# Patient Record
Sex: FEMALE | Race: WHITE | Employment: OTHER | URBAN - METROPOLITAN AREA
[De-identification: names, ages, dates, MRNs, and addresses within clinical notes are randomized per-mention and may not be internally consistent; named-entity substitution may affect disease eponyms.]

---

## 2019-02-27 ENCOUNTER — HOSPITAL ENCOUNTER (OUTPATIENT)
Age: 84
Discharge: HOME | End: 2019-03-01
Payer: MEDICARE

## 2019-02-27 LAB
ALBUMIN SERPL-MCNC: 4.5 G/DL (ref 3.5–5.2)
ALP BLD-CCNC: 115 U/L (ref 35–104)
ALT SERPL-CCNC: 26 U/L (ref 0–32)
ANION GAP SERPL CALCULATED.3IONS-SCNC: 13 MMOL/L (ref 7–16)
AST SERPL-CCNC: 37 U/L (ref 0–31)
BASOPHILS ABSOLUTE: 0.04 E9/L (ref 0–0.2)
BASOPHILS RELATIVE PERCENT: 0.7 % (ref 0–2)
BILIRUB SERPL-MCNC: 0.5 MG/DL (ref 0–1.2)
BUN BLDV-MCNC: 16 MG/DL (ref 8–23)
CALCIUM SERPL-MCNC: 9.6 MG/DL (ref 8.6–10.2)
CHLORIDE BLD-SCNC: 102 MMOL/L (ref 98–107)
CHOLESTEROL, TOTAL: 216 MG/DL (ref 0–199)
CO2: 28 MMOL/L (ref 22–29)
CREAT SERPL-MCNC: 0.8 MG/DL (ref 0.5–1)
EOSINOPHILS ABSOLUTE: 0.1 E9/L (ref 0.05–0.5)
EOSINOPHILS RELATIVE PERCENT: 1.7 % (ref 0–6)
GFR AFRICAN AMERICAN: >60
GFR NON-AFRICAN AMERICAN: >60 ML/MIN/1.73
GLUCOSE BLD-MCNC: 107 MG/DL (ref 74–99)
HCT VFR BLD CALC: 39.3 % (ref 34–48)
HDLC SERPL-MCNC: 63 MG/DL
HEMOGLOBIN: 11.8 G/DL (ref 11.5–15.5)
IMMATURE GRANULOCYTES #: 0.01 E9/L
IMMATURE GRANULOCYTES %: 0.2 % (ref 0–5)
LDL CHOLESTEROL CALCULATED: 140 MG/DL (ref 0–99)
LYMPHOCYTES ABSOLUTE: 0.83 E9/L (ref 1.5–4)
LYMPHOCYTES RELATIVE PERCENT: 14.2 % (ref 20–42)
MCH RBC QN AUTO: 28.5 PG (ref 26–35)
MCHC RBC AUTO-ENTMCNC: 30 % (ref 32–34.5)
MCV RBC AUTO: 94.9 FL (ref 80–99.9)
MONOCYTES ABSOLUTE: 0.53 E9/L (ref 0.1–0.95)
MONOCYTES RELATIVE PERCENT: 9.1 % (ref 2–12)
NEUTROPHILS ABSOLUTE: 4.33 E9/L (ref 1.8–7.3)
NEUTROPHILS RELATIVE PERCENT: 74.1 % (ref 43–80)
PDW BLD-RTO: 13.7 FL (ref 11.5–15)
PLATELET # BLD: 225 E9/L (ref 130–450)
PMV BLD AUTO: 11.8 FL (ref 7–12)
POTASSIUM SERPL-SCNC: 4.7 MMOL/L (ref 3.5–5)
RBC # BLD: 4.14 E12/L (ref 3.5–5.5)
SODIUM BLD-SCNC: 143 MMOL/L (ref 132–146)
TOTAL PROTEIN: 7.3 G/DL (ref 6.4–8.3)
TRIGL SERPL-MCNC: 63 MG/DL (ref 0–149)
TSH SERPL DL<=0.05 MIU/L-ACNC: 1.35 UIU/ML (ref 0.27–4.2)
VITAMIN D 25-HYDROXY: 45 NG/ML (ref 30–100)
VLDLC SERPL CALC-MCNC: 13 MG/DL
WBC # BLD: 5.8 E9/L (ref 4.5–11.5)

## 2019-02-27 PROCEDURE — 84443 ASSAY THYROID STIM HORMONE: CPT

## 2019-02-27 PROCEDURE — 80061 LIPID PANEL: CPT

## 2019-02-27 PROCEDURE — 85025 COMPLETE CBC W/AUTO DIFF WBC: CPT

## 2019-02-27 PROCEDURE — 80053 COMPREHEN METABOLIC PANEL: CPT

## 2019-02-27 PROCEDURE — 82306 VITAMIN D 25 HYDROXY: CPT

## 2021-01-24 ENCOUNTER — APPOINTMENT (OUTPATIENT)
Dept: GENERAL RADIOLOGY | Age: 86
DRG: 316 | End: 2021-01-24
Payer: MEDICARE

## 2021-01-24 ENCOUNTER — APPOINTMENT (OUTPATIENT)
Dept: CT IMAGING | Age: 86
DRG: 316 | End: 2021-01-24
Payer: MEDICARE

## 2021-01-24 ENCOUNTER — HOSPITAL ENCOUNTER (INPATIENT)
Age: 86
LOS: 4 days | Discharge: HOME | DRG: 316 | End: 2021-01-29
Attending: EMERGENCY MEDICINE | Admitting: INTERNAL MEDICINE
Payer: MEDICARE

## 2021-01-24 DIAGNOSIS — J90 PLEURAL EFFUSION: ICD-10-CM

## 2021-01-24 DIAGNOSIS — J18.9 PNEUMONIA DUE TO ORGANISM: ICD-10-CM

## 2021-01-24 DIAGNOSIS — I31.39 PERICARDIAL EFFUSION: Primary | ICD-10-CM

## 2021-01-24 LAB
ANION GAP SERPL CALCULATED.3IONS-SCNC: 11 MMOL/L (ref 7–16)
APTT: 31.8 SEC (ref 24.5–35.1)
BACTERIA: NORMAL /HPF
BASOPHILS ABSOLUTE: 0.04 E9/L (ref 0–0.2)
BASOPHILS RELATIVE PERCENT: 0.6 % (ref 0–2)
BILIRUBIN URINE: NEGATIVE
BLOOD, URINE: ABNORMAL
BUN BLDV-MCNC: 15 MG/DL (ref 8–23)
CALCIUM SERPL-MCNC: 9.6 MG/DL (ref 8.6–10.2)
CHLORIDE BLD-SCNC: 102 MMOL/L (ref 98–107)
CLARITY: CLEAR
CO2: 26 MMOL/L (ref 22–29)
COLOR: YELLOW
CREAT SERPL-MCNC: 1.1 MG/DL (ref 0.5–1)
EOSINOPHILS ABSOLUTE: 0.11 E9/L (ref 0.05–0.5)
EOSINOPHILS RELATIVE PERCENT: 1.6 % (ref 0–6)
GFR AFRICAN AMERICAN: 57
GFR NON-AFRICAN AMERICAN: 47 ML/MIN/1.73
GLUCOSE BLD-MCNC: 105 MG/DL (ref 74–99)
GLUCOSE URINE: NEGATIVE MG/DL
HCT VFR BLD CALC: 38.2 % (ref 34–48)
HEMOGLOBIN: 11.4 G/DL (ref 11.5–15.5)
IMMATURE GRANULOCYTES #: 0.01 E9/L
IMMATURE GRANULOCYTES %: 0.1 % (ref 0–5)
INR BLD: 1.5
KETONES, URINE: NEGATIVE MG/DL
LEUKOCYTE ESTERASE, URINE: NEGATIVE
LYMPHOCYTES ABSOLUTE: 0.81 E9/L (ref 1.5–4)
LYMPHOCYTES RELATIVE PERCENT: 11.9 % (ref 20–42)
MCH RBC QN AUTO: 25.4 PG (ref 26–35)
MCHC RBC AUTO-ENTMCNC: 29.8 % (ref 32–34.5)
MCV RBC AUTO: 85.3 FL (ref 80–99.9)
MONOCYTES ABSOLUTE: 0.65 E9/L (ref 0.1–0.95)
MONOCYTES RELATIVE PERCENT: 9.6 % (ref 2–12)
NEUTROPHILS ABSOLUTE: 5.17 E9/L (ref 1.8–7.3)
NEUTROPHILS RELATIVE PERCENT: 76.2 % (ref 43–80)
NITRITE, URINE: NEGATIVE
PDW BLD-RTO: 16.4 FL (ref 11.5–15)
PH UA: 6 (ref 5–9)
PLATELET # BLD: 136 E9/L (ref 130–450)
PMV BLD AUTO: 13.2 FL (ref 7–12)
POTASSIUM REFLEX MAGNESIUM: 3.9 MMOL/L (ref 3.5–5)
PROTEIN UA: NEGATIVE MG/DL
PROTHROMBIN TIME: 16.4 SEC (ref 9.3–12.4)
RBC # BLD: 4.48 E12/L (ref 3.5–5.5)
RBC UA: NORMAL /HPF (ref 0–2)
SARS-COV-2, NAAT: NOT DETECTED
SODIUM BLD-SCNC: 139 MMOL/L (ref 132–146)
SPECIFIC GRAVITY UA: <=1.005 (ref 1–1.03)
TROPONIN: <0.01 NG/ML (ref 0–0.03)
UROBILINOGEN, URINE: 0.2 E.U./DL
WBC # BLD: 6.8 E9/L (ref 4.5–11.5)
WBC UA: NORMAL /HPF (ref 0–5)

## 2021-01-24 PROCEDURE — 71046 X-RAY EXAM CHEST 2 VIEWS: CPT

## 2021-01-24 PROCEDURE — 2580000003 HC RX 258: Performed by: EMERGENCY MEDICINE

## 2021-01-24 PROCEDURE — 85610 PROTHROMBIN TIME: CPT

## 2021-01-24 PROCEDURE — 85730 THROMBOPLASTIN TIME PARTIAL: CPT

## 2021-01-24 PROCEDURE — 36415 COLL VENOUS BLD VENIPUNCTURE: CPT

## 2021-01-24 PROCEDURE — U0002 COVID-19 LAB TEST NON-CDC: HCPCS

## 2021-01-24 PROCEDURE — 70450 CT HEAD/BRAIN W/O DYE: CPT

## 2021-01-24 PROCEDURE — 85025 COMPLETE CBC W/AUTO DIFF WBC: CPT

## 2021-01-24 PROCEDURE — 83880 ASSAY OF NATRIURETIC PEPTIDE: CPT

## 2021-01-24 PROCEDURE — 87088 URINE BACTERIA CULTURE: CPT

## 2021-01-24 PROCEDURE — 84484 ASSAY OF TROPONIN QUANT: CPT

## 2021-01-24 PROCEDURE — 99285 EMERGENCY DEPT VISIT HI MDM: CPT

## 2021-01-24 PROCEDURE — 81001 URINALYSIS AUTO W/SCOPE: CPT

## 2021-01-24 PROCEDURE — 71045 X-RAY EXAM CHEST 1 VIEW: CPT

## 2021-01-24 PROCEDURE — 96374 THER/PROPH/DIAG INJ IV PUSH: CPT

## 2021-01-24 PROCEDURE — 93005 ELECTROCARDIOGRAM TRACING: CPT | Performed by: EMERGENCY MEDICINE

## 2021-01-24 PROCEDURE — 80048 BASIC METABOLIC PNL TOTAL CA: CPT

## 2021-01-24 RX ORDER — 0.9 % SODIUM CHLORIDE 0.9 %
1000 INTRAVENOUS SOLUTION INTRAVENOUS ONCE
Status: COMPLETED | OUTPATIENT
Start: 2021-01-24 | End: 2021-01-24

## 2021-01-24 RX ADMIN — SODIUM CHLORIDE 1000 ML: 9 INJECTION, SOLUTION INTRAVENOUS at 20:29

## 2021-01-25 ENCOUNTER — APPOINTMENT (OUTPATIENT)
Dept: GENERAL RADIOLOGY | Age: 86
DRG: 316 | End: 2021-01-25
Payer: MEDICARE

## 2021-01-25 ENCOUNTER — ANESTHESIA EVENT (OUTPATIENT)
Dept: OPERATING ROOM | Age: 86
DRG: 316 | End: 2021-01-25
Payer: MEDICARE

## 2021-01-25 ENCOUNTER — APPOINTMENT (OUTPATIENT)
Dept: CT IMAGING | Age: 86
DRG: 316 | End: 2021-01-25
Payer: MEDICARE

## 2021-01-25 ENCOUNTER — ANESTHESIA (OUTPATIENT)
Dept: OPERATING ROOM | Age: 86
DRG: 316 | End: 2021-01-25
Payer: MEDICARE

## 2021-01-25 VITALS — OXYGEN SATURATION: 95 %

## 2021-01-25 PROBLEM — I31.39 PERICARDIAL EFFUSION: Status: ACTIVE | Noted: 2021-01-25

## 2021-01-25 PROBLEM — G89.4 PAIN SYNDROME, CHRONIC: Status: ACTIVE | Noted: 2021-01-25

## 2021-01-25 LAB
ABO/RH: NORMAL
ANION GAP SERPL CALCULATED.3IONS-SCNC: 13 MMOL/L (ref 7–16)
ANTIBODY SCREEN: NORMAL
BLOOD BANK DISPENSE STATUS: NORMAL
BLOOD BANK PRODUCT CODE: NORMAL
BPU ID: NORMAL
BUN BLDV-MCNC: 12 MG/DL (ref 8–23)
C-REACTIVE PROTEIN: 0.3 MG/DL (ref 0–0.4)
CALCIUM SERPL-MCNC: 9.1 MG/DL (ref 8.6–10.2)
CHLORIDE BLD-SCNC: 105 MMOL/L (ref 98–107)
CO2: 23 MMOL/L (ref 22–29)
CREAT SERPL-MCNC: 0.8 MG/DL (ref 0.5–1)
DESCRIPTION BLOOD BANK: NORMAL
EKG ATRIAL RATE: 99 BPM
EKG P AXIS: 42 DEGREES
EKG P-R INTERVAL: 138 MS
EKG Q-T INTERVAL: 310 MS
EKG QRS DURATION: 74 MS
EKG QTC CALCULATION (BAZETT): 397 MS
EKG R AXIS: 10 DEGREES
EKG T AXIS: 23 DEGREES
EKG VENTRICULAR RATE: 99 BPM
GFR AFRICAN AMERICAN: >60
GFR NON-AFRICAN AMERICAN: >60 ML/MIN/1.73
GLUCOSE BLD-MCNC: 105 MG/DL (ref 74–99)
HCT VFR BLD CALC: 35.8 % (ref 34–48)
HEMOGLOBIN: 11.1 G/DL (ref 11.5–15.5)
MAGNESIUM: 1.9 MG/DL (ref 1.6–2.6)
MCH RBC QN AUTO: 25.7 PG (ref 26–35)
MCHC RBC AUTO-ENTMCNC: 31 % (ref 32–34.5)
MCV RBC AUTO: 82.9 FL (ref 80–99.9)
PDW BLD-RTO: 16.4 FL (ref 11.5–15)
PLATELET # BLD: 128 E9/L (ref 130–450)
PMV BLD AUTO: 12.6 FL (ref 7–12)
POTASSIUM SERPL-SCNC: 3.8 MMOL/L (ref 3.5–5)
PRO-BNP: 1535 PG/ML (ref 0–450)
RBC # BLD: 4.32 E12/L (ref 3.5–5.5)
SEDIMENTATION RATE, ERYTHROCYTE: 2 MM/HR (ref 0–20)
SODIUM BLD-SCNC: 141 MMOL/L (ref 132–146)
TSH SERPL DL<=0.05 MIU/L-ACNC: 1.77 UIU/ML (ref 0.27–4.2)
WBC # BLD: 5.7 E9/L (ref 4.5–11.5)

## 2021-01-25 PROCEDURE — 2580000003 HC RX 258: Performed by: THORACIC SURGERY (CARDIOTHORACIC VASCULAR SURGERY)

## 2021-01-25 PROCEDURE — 6360000002 HC RX W HCPCS: Performed by: ANESTHESIOLOGY

## 2021-01-25 PROCEDURE — 3600000014 HC SURGERY LEVEL 4 ADDTL 15MIN: Performed by: THORACIC SURGERY (CARDIOTHORACIC VASCULAR SURGERY)

## 2021-01-25 PROCEDURE — 2580000003 HC RX 258: Performed by: EMERGENCY MEDICINE

## 2021-01-25 PROCEDURE — 86140 C-REACTIVE PROTEIN: CPT

## 2021-01-25 PROCEDURE — 87070 CULTURE OTHR SPECIMN AEROBIC: CPT

## 2021-01-25 PROCEDURE — 88305 TISSUE EXAM BY PATHOLOGIST: CPT

## 2021-01-25 PROCEDURE — 84443 ASSAY THYROID STIM HORMONE: CPT

## 2021-01-25 PROCEDURE — 80048 BASIC METABOLIC PNL TOTAL CA: CPT

## 2021-01-25 PROCEDURE — 86850 RBC ANTIBODY SCREEN: CPT

## 2021-01-25 PROCEDURE — 87102 FUNGUS ISOLATION CULTURE: CPT

## 2021-01-25 PROCEDURE — 71250 CT THORAX DX C-: CPT

## 2021-01-25 PROCEDURE — 86923 COMPATIBILITY TEST ELECTRIC: CPT

## 2021-01-25 PROCEDURE — 33017 PRCRD DRG 6YR+ W/O CGEN CAR: CPT | Performed by: THORACIC SURGERY (CARDIOTHORACIC VASCULAR SURGERY)

## 2021-01-25 PROCEDURE — 6370000000 HC RX 637 (ALT 250 FOR IP): Performed by: INTERNAL MEDICINE

## 2021-01-25 PROCEDURE — 6360000002 HC RX W HCPCS: Performed by: PHYSICIAN ASSISTANT

## 2021-01-25 PROCEDURE — 93010 ELECTROCARDIOGRAM REPORT: CPT | Performed by: INTERNAL MEDICINE

## 2021-01-25 PROCEDURE — 87075 CULTR BACTERIA EXCEPT BLOOD: CPT

## 2021-01-25 PROCEDURE — 7100000001 HC PACU RECOVERY - ADDTL 15 MIN: Performed by: THORACIC SURGERY (CARDIOTHORACIC VASCULAR SURGERY)

## 2021-01-25 PROCEDURE — C1729 CATH, DRAINAGE: HCPCS | Performed by: THORACIC SURGERY (CARDIOTHORACIC VASCULAR SURGERY)

## 2021-01-25 PROCEDURE — 99222 1ST HOSP IP/OBS MODERATE 55: CPT | Performed by: THORACIC SURGERY (CARDIOTHORACIC VASCULAR SURGERY)

## 2021-01-25 PROCEDURE — 7100000000 HC PACU RECOVERY - FIRST 15 MIN: Performed by: THORACIC SURGERY (CARDIOTHORACIC VASCULAR SURGERY)

## 2021-01-25 PROCEDURE — 2709999900 HC NON-CHARGEABLE SUPPLY: Performed by: THORACIC SURGERY (CARDIOTHORACIC VASCULAR SURGERY)

## 2021-01-25 PROCEDURE — 2580000003 HC RX 258: Performed by: INTERNAL MEDICINE

## 2021-01-25 PROCEDURE — 51798 US URINE CAPACITY MEASURE: CPT

## 2021-01-25 PROCEDURE — 6370000000 HC RX 637 (ALT 250 FOR IP): Performed by: THORACIC SURGERY (CARDIOTHORACIC VASCULAR SURGERY)

## 2021-01-25 PROCEDURE — 6360000002 HC RX W HCPCS

## 2021-01-25 PROCEDURE — 3700000000 HC ANESTHESIA ATTENDED CARE: Performed by: THORACIC SURGERY (CARDIOTHORACIC VASCULAR SURGERY)

## 2021-01-25 PROCEDURE — 3600000004 HC SURGERY LEVEL 4 BASE: Performed by: THORACIC SURGERY (CARDIOTHORACIC VASCULAR SURGERY)

## 2021-01-25 PROCEDURE — 88112 CYTOPATH CELL ENHANCE TECH: CPT

## 2021-01-25 PROCEDURE — 71045 X-RAY EXAM CHEST 1 VIEW: CPT

## 2021-01-25 PROCEDURE — 6360000002 HC RX W HCPCS: Performed by: THORACIC SURGERY (CARDIOTHORACIC VASCULAR SURGERY)

## 2021-01-25 PROCEDURE — 86901 BLOOD TYPING SEROLOGIC RH(D): CPT

## 2021-01-25 PROCEDURE — 3700000001 HC ADD 15 MINUTES (ANESTHESIA): Performed by: THORACIC SURGERY (CARDIOTHORACIC VASCULAR SURGERY)

## 2021-01-25 PROCEDURE — 0W9D3ZZ DRAINAGE OF PERICARDIAL CAVITY, PERCUTANEOUS APPROACH: ICD-10-PCS | Performed by: THORACIC SURGERY (CARDIOTHORACIC VASCULAR SURGERY)

## 2021-01-25 PROCEDURE — 2580000003 HC RX 258

## 2021-01-25 PROCEDURE — 87205 SMEAR GRAM STAIN: CPT

## 2021-01-25 PROCEDURE — 6360000002 HC RX W HCPCS: Performed by: EMERGENCY MEDICINE

## 2021-01-25 PROCEDURE — 2140000000 HC CCU INTERMEDIATE R&B

## 2021-01-25 PROCEDURE — 2500000003 HC RX 250 WO HCPCS: Performed by: EMERGENCY MEDICINE

## 2021-01-25 PROCEDURE — 85027 COMPLETE CBC AUTOMATED: CPT

## 2021-01-25 PROCEDURE — 86900 BLOOD TYPING SEROLOGIC ABO: CPT

## 2021-01-25 PROCEDURE — 36415 COLL VENOUS BLD VENIPUNCTURE: CPT

## 2021-01-25 PROCEDURE — 85651 RBC SED RATE NONAUTOMATED: CPT

## 2021-01-25 PROCEDURE — 83735 ASSAY OF MAGNESIUM: CPT

## 2021-01-25 PROCEDURE — 99999 PR OFFICE/OUTPT VISIT,PROCEDURE ONLY: CPT | Performed by: PHYSICIAN ASSISTANT

## 2021-01-25 RX ORDER — SODIUM CHLORIDE 0.9 % (FLUSH) 0.9 %
10 SYRINGE (ML) INJECTION EVERY 12 HOURS SCHEDULED
Status: DISCONTINUED | OUTPATIENT
Start: 2021-01-25 | End: 2021-01-29 | Stop reason: HOSPADM

## 2021-01-25 RX ORDER — ACETAMINOPHEN 325 MG/1
650 TABLET ORAL EVERY 4 HOURS PRN
Status: DISCONTINUED | OUTPATIENT
Start: 2021-01-25 | End: 2021-01-29 | Stop reason: HOSPADM

## 2021-01-25 RX ORDER — MORPHINE SULFATE 15 MG/1
15 TABLET, FILM COATED, EXTENDED RELEASE ORAL DAILY
Status: ON HOLD | COMMUNITY
End: 2021-01-29 | Stop reason: HOSPADM

## 2021-01-25 RX ORDER — CHOLECALCIFEROL (VITAMIN D3) 1250 MCG
CAPSULE ORAL WEEKLY
COMMUNITY

## 2021-01-25 RX ORDER — SODIUM CHLORIDE 0.9 % (FLUSH) 0.9 %
10 SYRINGE (ML) INJECTION PRN
Status: DISCONTINUED | OUTPATIENT
Start: 2021-01-25 | End: 2021-01-29 | Stop reason: HOSPADM

## 2021-01-25 RX ORDER — MEPERIDINE HYDROCHLORIDE 25 MG/ML
12.5 INJECTION INTRAMUSCULAR; INTRAVENOUS; SUBCUTANEOUS EVERY 5 MIN PRN
Status: DISCONTINUED | OUTPATIENT
Start: 2021-01-25 | End: 2021-01-25 | Stop reason: HOSPADM

## 2021-01-25 RX ORDER — FENTANYL CITRATE 50 UG/ML
INJECTION, SOLUTION INTRAMUSCULAR; INTRAVENOUS PRN
Status: DISCONTINUED | OUTPATIENT
Start: 2021-01-25 | End: 2021-01-25 | Stop reason: SDUPTHER

## 2021-01-25 RX ORDER — OXYCODONE HYDROCHLORIDE 5 MG/1
5 TABLET ORAL EVERY 4 HOURS PRN
Status: DISCONTINUED | OUTPATIENT
Start: 2021-01-25 | End: 2021-01-29 | Stop reason: HOSPADM

## 2021-01-25 RX ORDER — LISINOPRIL 10 MG/1
10 TABLET ORAL DAILY
Status: DISCONTINUED | OUTPATIENT
Start: 2021-01-25 | End: 2021-01-29 | Stop reason: HOSPADM

## 2021-01-25 RX ORDER — SENNA AND DOCUSATE SODIUM 50; 8.6 MG/1; MG/1
1 TABLET, FILM COATED ORAL 2 TIMES DAILY
Status: DISCONTINUED | OUTPATIENT
Start: 2021-01-25 | End: 2021-01-29 | Stop reason: HOSPADM

## 2021-01-25 RX ORDER — PROPOFOL 10 MG/ML
INJECTION, EMULSION INTRAVENOUS CONTINUOUS PRN
Status: DISCONTINUED | OUTPATIENT
Start: 2021-01-25 | End: 2021-01-25 | Stop reason: SDUPTHER

## 2021-01-25 RX ORDER — HYDRALAZINE HYDROCHLORIDE 20 MG/ML
5 INJECTION INTRAMUSCULAR; INTRAVENOUS EVERY 10 MIN PRN
Status: DISCONTINUED | OUTPATIENT
Start: 2021-01-25 | End: 2021-01-25 | Stop reason: HOSPADM

## 2021-01-25 RX ORDER — LABETALOL HYDROCHLORIDE 5 MG/ML
5 INJECTION, SOLUTION INTRAVENOUS EVERY 10 MIN PRN
Status: DISCONTINUED | OUTPATIENT
Start: 2021-01-25 | End: 2021-01-25 | Stop reason: HOSPADM

## 2021-01-25 RX ORDER — SODIUM CHLORIDE 9 MG/ML
INJECTION, SOLUTION INTRAVENOUS CONTINUOUS PRN
Status: DISCONTINUED | OUTPATIENT
Start: 2021-01-25 | End: 2021-01-25 | Stop reason: SDUPTHER

## 2021-01-25 RX ORDER — PROMETHAZINE HYDROCHLORIDE 25 MG/ML
6.25 INJECTION, SOLUTION INTRAMUSCULAR; INTRAVENOUS
Status: COMPLETED | OUTPATIENT
Start: 2021-01-25 | End: 2021-01-25

## 2021-01-25 RX ORDER — ACETAMINOPHEN 325 MG/1
650 TABLET ORAL EVERY 6 HOURS
Status: COMPLETED | OUTPATIENT
Start: 2021-01-25 | End: 2021-01-27

## 2021-01-25 RX ORDER — VENLAFAXINE 75 MG/1
75 TABLET ORAL DAILY
Status: DISCONTINUED | OUTPATIENT
Start: 2021-01-25 | End: 2021-01-29 | Stop reason: HOSPADM

## 2021-01-25 RX ORDER — ONDANSETRON 2 MG/ML
4 INJECTION INTRAMUSCULAR; INTRAVENOUS EVERY 6 HOURS PRN
Status: DISCONTINUED | OUTPATIENT
Start: 2021-01-25 | End: 2021-01-29 | Stop reason: HOSPADM

## 2021-01-25 RX ORDER — SODIUM CHLORIDE, SODIUM LACTATE, POTASSIUM CHLORIDE, CALCIUM CHLORIDE 600; 310; 30; 20 MG/100ML; MG/100ML; MG/100ML; MG/100ML
INJECTION, SOLUTION INTRAVENOUS CONTINUOUS
Status: DISCONTINUED | OUTPATIENT
Start: 2021-01-25 | End: 2021-01-27

## 2021-01-25 RX ORDER — BISACODYL 10 MG
10 SUPPOSITORY, RECTAL RECTAL DAILY PRN
Status: DISCONTINUED | OUTPATIENT
Start: 2021-01-25 | End: 2021-01-29 | Stop reason: HOSPADM

## 2021-01-25 RX ORDER — SODIUM CHLORIDE 9 MG/ML
INJECTION, SOLUTION INTRAVENOUS PRN
Status: DISCONTINUED | OUTPATIENT
Start: 2021-01-25 | End: 2021-01-29 | Stop reason: HOSPADM

## 2021-01-25 RX ORDER — MIDAZOLAM HYDROCHLORIDE 1 MG/ML
2 INJECTION INTRAMUSCULAR; INTRAVENOUS ONCE
Status: COMPLETED | OUTPATIENT
Start: 2021-01-25 | End: 2021-01-25

## 2021-01-25 RX ORDER — AMLODIPINE BESYLATE 5 MG/1
5 TABLET ORAL DAILY
Status: DISCONTINUED | OUTPATIENT
Start: 2021-01-25 | End: 2021-01-29 | Stop reason: HOSPADM

## 2021-01-25 RX ADMIN — FENTANYL CITRATE 50 MCG: 50 INJECTION, SOLUTION INTRAMUSCULAR; INTRAVENOUS at 13:14

## 2021-01-25 RX ADMIN — SODIUM CHLORIDE, PRESERVATIVE FREE 10 ML: 5 INJECTION INTRAVENOUS at 08:12

## 2021-01-25 RX ADMIN — PROMETHAZINE HYDROCHLORIDE 6.25 MG: 25 INJECTION INTRAMUSCULAR; INTRAVENOUS at 13:56

## 2021-01-25 RX ADMIN — SODIUM CHLORIDE, PRESERVATIVE FREE 10 ML: 5 INJECTION INTRAVENOUS at 21:10

## 2021-01-25 RX ADMIN — LISINOPRIL 10 MG: 10 TABLET ORAL at 17:42

## 2021-01-25 RX ADMIN — SODIUM CHLORIDE: 9 INJECTION, SOLUTION INTRAVENOUS at 13:14

## 2021-01-25 RX ADMIN — CEFTRIAXONE SODIUM 1000 MG: 1 INJECTION, POWDER, FOR SOLUTION INTRAMUSCULAR; INTRAVENOUS at 01:06

## 2021-01-25 RX ADMIN — SODIUM CHLORIDE, POTASSIUM CHLORIDE, SODIUM LACTATE AND CALCIUM CHLORIDE: 600; 310; 30; 20 INJECTION, SOLUTION INTRAVENOUS at 08:12

## 2021-01-25 RX ADMIN — DOCUSATE SODIUM 50 MG AND SENNOSIDES 8.6 MG 1 TABLET: 8.6; 5 TABLET, FILM COATED ORAL at 21:10

## 2021-01-25 RX ADMIN — AMLODIPINE BESYLATE 5 MG: 5 TABLET ORAL at 17:41

## 2021-01-25 RX ADMIN — FENTANYL CITRATE 50 MCG: 50 INJECTION, SOLUTION INTRAMUSCULAR; INTRAVENOUS at 13:16

## 2021-01-25 RX ADMIN — PROPOFOL 50 MCG/KG/MIN: 10 INJECTION, EMULSION INTRAVENOUS at 13:14

## 2021-01-25 RX ADMIN — MIDAZOLAM 2 MG: 1 INJECTION INTRAMUSCULAR; INTRAVENOUS at 13:13

## 2021-01-25 RX ADMIN — DOXYCYCLINE 100 MG: 100 INJECTION, POWDER, LYOPHILIZED, FOR SOLUTION INTRAVENOUS at 01:09

## 2021-01-25 RX ADMIN — OXYCODONE 5 MG: 5 TABLET ORAL at 21:30

## 2021-01-25 RX ADMIN — ACETAMINOPHEN 650 MG: 325 TABLET ORAL at 02:50

## 2021-01-25 RX ADMIN — Medication 2 G: at 13:25

## 2021-01-25 RX ADMIN — Medication 2000 MG: at 21:10

## 2021-01-25 RX ADMIN — ACETAMINOPHEN 650 MG: 325 TABLET ORAL at 17:02

## 2021-01-25 RX ADMIN — VENLAFAXINE HYDROCHLORIDE 75 MG: 75 TABLET ORAL at 17:41

## 2021-01-25 RX ADMIN — ACETAMINOPHEN 650 MG: 325 TABLET ORAL at 21:31

## 2021-01-25 ASSESSMENT — PULMONARY FUNCTION TESTS
PIF_VALUE: 1
PIF_VALUE: 1
PIF_VALUE: 0
PIF_VALUE: 1
PIF_VALUE: 0
PIF_VALUE: 1
PIF_VALUE: 0

## 2021-01-25 ASSESSMENT — PAIN SCALES - GENERAL
PAINLEVEL_OUTOF10: 0
PAINLEVEL_OUTOF10: 0
PAINLEVEL_OUTOF10: 9
PAINLEVEL_OUTOF10: 4
PAINLEVEL_OUTOF10: 6
PAINLEVEL_OUTOF10: 0

## 2021-01-25 ASSESSMENT — PAIN DESCRIPTION - DESCRIPTORS: DESCRIPTORS: ACHING;DULL;HEADACHE

## 2021-01-25 ASSESSMENT — PAIN DESCRIPTION - FREQUENCY: FREQUENCY: INTERMITTENT

## 2021-01-25 NOTE — ANESTHESIA PROCEDURE NOTES
Arterial Line:    An arterial line was placed using surface landmarks, in the OR for the following indication(s): continuous blood pressure monitoring and blood sampling needed. A 20 gauge (size), 1 and 3/8 inch (length), Arrow (type) catheter was placed, Seldinger technique not used, into the right radial artery, secured by tape. Anesthesia type: Local  Local infiltration: Injection    Events:  patient tolerated procedure well with no complications. Anesthesiologist: Arabella Persaud MD  Resident/CRNA: HAIDER Lane - CRNA  Other anesthesia staff: Sal Barrera RN  Performed:  Other anesthesia staff   Preanesthetic Checklist  Completed: patient identified, IV checked, site marked, risks and benefits discussed, surgical consent, monitors and equipment checked, pre-op evaluation, timeout performed, anesthesia consent given, oxygen available and patient being monitored

## 2021-01-25 NOTE — ANESTHESIA POSTPROCEDURE EVALUATION
Department of Anesthesiology  Postprocedure Note    Patient: Ignacia Echevarria  MRN: [id number]  YOB: 1935  Date of evaluation: 1/25/2021  Time:  2:53 PM     Procedure Summary     Date: 01/25/21 Room / Location: [address] [place] [address] / [facility]    Anesthesia Start: 3191 Anesthesia Stop: 3457    Procedure: PERICARDIOCENTESIS (N/A ) Diagnosis: (.)    Surgeons: Joanie Salazar MD Responsible Provider: Sammi Stewart MD    Anesthesia Type: MAC ASA Status: 4          Anesthesia Type: MAC    Stacey Phase I: Stacey Score: 10    Stacey Phase II:      Last vitals: Reviewed and per EMR flowsheets.        Anesthesia Post Evaluation    Patient location during evaluation: PACU  Patient participation: complete - patient participated  Level of consciousness: awake and alert  Airway patency: patent  Nausea & Vomiting: no nausea and no vomiting  Complications: no  Cardiovascular status: hemodynamically stable and blood pressure returned to baseline  Respiratory status: acceptable  Hydration status: euvolemic

## 2021-01-25 NOTE — PLAN OF CARE
Problem: Pain:  Description: Pain management should include both nonpharmacologic and pharmacologic interventions.   Goal: Pain level will decrease  Description: Pain level will decrease  Outcome: Met This Shift     Problem: Falls - Risk of:  Goal: Will remain free from falls  Description: Will remain free from falls  Outcome: Met This Shift

## 2021-01-25 NOTE — BRIEF OP NOTE
Brief Postoperative Note    Kashif Nassar  YOB: 1935  [id number]    Pre-operative Diagnosis: Pericardial effusion    Post-operative Diagnosis: Same    Operation: Ultrasound guided pericardiocentesis with drain    Anesthesia: [facility]    Surgeon: Apolonia King    Assistants: Dante    Estimated Blood Loss: less than 50     Complications: None    Specimens:   ID Type Source Tests Collected by Time Destination   1 : PERICARDIAL FLUID Tissue Heart CULTURE, ANAEROBIC, CULTURE, FUNGUS, GRAM STAIN, CULTURE, SURGICAL Rosa Maria Bennett MD 1/25/2021 1340    A : PERICARDIAL FLUID Tissue Heart CYTOLOGY, NON-GYN Rosa Maria Bennett MD 1/25/2021 1341        Implants:  * No implants in log *      Drains: * No LDAs found *    Findings: 1405 cc straw colored fluid, pericardial space empty at end of case    Electronically signed by Rosa Maria Bennett MD on 1/25/2021 at 1:44 PM

## 2021-01-25 NOTE — PROGRESS NOTES
Silvia. Rob Alberto [address] [facility] [id number] Re: patient is requesting he should be at bedside.

## 2021-01-25 NOTE — CARE COORDINATION
SOCIAL WORK/CASEMANAGEMENT TRANSITION OF CARE [id number] [facility] [place], [address], [place], [phone]): I called son, tanja, to complete assessment. He said pt lives alone in a 2 story home with 3 front and back steps to enter. Pt no longer drives. She is independent with bathing and dressing. Catrina Givens said he has been helping with care of pt for the past 5 years and he sets up her pill paks. Pt fixes mainly t.v. dinners and gets meals on wheels. No c pta. Pt has 2 sons but local. Catrina Givens works full times. Shasha Hayes just retired and only visits 1x a month and is moving to [place] soon. He and pt have a strained relationship. Pt doesn't have a advance directives. Pt has a fww, cane, shower chair and cowboy toilet. Pt has been to [facility] in the past. Catrina Givens said he and the pcp have talked multiple times that pt should live in a eliazar but she refuses. I left advance directives in the room for tanja. I told him that now is the time due to age and forgetfulness to complete them with pt before they cannot be done. We talked about aides coming in and pt refuses. We discussed as well a alert button and that per son was not a good option he said pt may hit it multiple times a day. Sw/cm to follow up with tanja and pt for discharge needs. Will need PT and OT ordered after any procedure to figure out discharge needs.  Maricel Steward  1/25/2021

## 2021-01-25 NOTE — OP NOTE
[address]                  [address] [place],  [facility]                                OPERATIVE REPORT    PATIENT NAME: Kaitlynn Carlson                   :        1935  MED REC NO:   [id number]                            ROOM:       [facility]  ACCOUNT NO:   [de-identified]                           ADMIT DATE: 2021  PROVIDER:     Katia Lind MD    DATE OF PROCEDURE:  2021    PREOPERATIVE DIAGNOSIS:  Pericardial effusion. POSTOPERATIVE DIAGNOSIS:  Pericardial effusion. INDICATION:  Pericardial effusion. SURGEON:  Katia Lind MD    ASSISTANT:  GEOVANNY Lam, (no other resident was adequately  trained to be able to assist). SECOND ASSISTANT:  Sergey Powers DO    COMPLICATIONS:  None, tolerated well. ESTIMATED BLOOD LOSS:  Less than 50 mL. ANESTHESIA:  Local with MAC anesthesia. ANESTHESIA ATTENDING:  Carolin Pagan MD    SPECIMEN OBTAINED:  Include pericardial fluid for culture and cytology. OPERATIONS PERFORMED:  Ultrasound-guided pericardiocentesis with drain  placement. FINDINGS:  There was 1405 mL of straw-colored pericardial fluid. The  pericardial space was empty at the end of the case. HISTORY:  This is an 26-year-old female who was admitted with dizziness. She was found to have a massive pericardial effusion. She is referred  for drainage. The patient was described the procedure in full including  risks and complications including but not limited to bleeding,  infection, need for reoperation, hemothorax, pneumothorax, stroke,  myocardial infarction, and death; and the patient agreed to proceed. DESCRIPTION OF PROCEDURE:  After adequate informed consent was obtained  and adequate preoperative antibiotics were given, the patient was  brought to the operating room in stable condition. She was laid in the  supine position and induced under MAC anesthesia by Anesthesia staff.    Her chest and abdomen were prepped and draped in the usual sterile  fashion. Ultrasound was used to find adequate window and local  medication was injected. A Cook needle was used to gain access into the  pericardial space and a guidewire was placed. This was then dilated up  to a 13-Sami pigtail, which was secured appropriately. We then  manually aspirated about 1405 mL of straw-colored pericardial fluid. Specimen was sent for culture and cytology. The drain was connected to  Pleur-evac and dressing was applied. The patient tolerated the  procedure well. The patient was transferred to recovery room in stable  condition. All sponge, instrument and needle counts were correct at the  end of the case.         Nawaf Fraga MD    D: 01/25/2021 14:01:49       T: 01/25/2021 15:19:49     [email or web address]  Job#: [id number]     Doc#: [id number]    CC:  Ena Escoto DO

## 2021-01-25 NOTE — ANESTHESIA PRE PROCEDURE
Department of Anesthesiology  Preprocedure Note       Name:  Hayden Taylor   Age:  82 y.o.  :  1935                                          MRN:  [id number]         Date:  2021      Surgeon: Mick Jaime):  Griselda Collins MD    Procedure: Procedure(s):  PERICARDIOCENTESIS    Medications prior to admission:   Prior to Admission medications    Medication Sig Start Date End Date Taking? Authorizing Provider   morphine (MS CONTIN) 15 MG extended release tablet Take 15 mg by mouth daily. Yes Historical Provider, MD   Cholecalciferol (VITAMIN D3) 1.25 MG (42913 UT) CAPS Take by mouth once a week    Historical Provider, MD   aspirin 325 MG EC tablet Take 1 tablet by mouth daily. 14   Silvio Delacruz MD   amLODIPine (NORVASC) 10 MG tablet Take 1 tablet by mouth daily. Patient taking differently: Take 5 mg by mouth daily  14   Silvio Delacruz MD   lisinopril (PRINIVIL;ZESTRIL) 10 MG tablet Take 1 tablet by mouth daily.  14   Silvio Delacruz MD   venlafaxine (EFFEXOR) 37.5 MG tablet Take 75 mg by mouth daily     Historical Provider, MD       Current medications:    Current Facility-Administered Medications   Medication Dose Route Frequency Provider Last Rate Last Admin    sodium chloride flush 0.9 % injection 10 mL  10 mL Intravenous 2 times per day Travis Moreno DO   10 mL at 21 4542    sodium chloride flush 0.9 % injection 10 mL  10 mL Intravenous PRN Travis Moreno DO        enoxaparin (LOVENOX) injection 40 mg  40 mg Subcutaneous Daily Travis Moreno DO        acetaminophen (TYLENOL) tablet 650 mg  650 mg Oral Q4H PRN Travis Moreno DO   650 mg at 21 0250    lactated ringers infusion   Intravenous Continuous Griselda Collins MD 75 mL/hr at 21 0812 New Bag at 21 0812    ceFAZolin (ANCEF) 2000 mg in sterile water 20 mL IV syringe  2,000 mg Intravenous See Admin Instructions GEOVANNY Anguiano        0.9 % sodium chloride infusion   Intravenous PRN Griselda Collins MD Allergies: Allergies   Allergen Reactions    Other Rash     \"A type of IVP dye from the 50's\"       Problem List:    Patient Active Problem List   Diagnosis Code    Anxiety F41.9    Migraine headache G43.909    Accidental drug overdose T50.901A    Essential hypertension, benign I10    GERD (gastroesophageal reflux disease) K21.9    Dementia ([facility] [address] F03.90    Polypharmacy Z79.899    Malnutrition ([facility] E46    Acute delirium R41.0    Pericardial effusion I31.3       Past Medical History:        Diagnosis Date    Accidental drug overdose 2014    Acute delirium 2014    Anxiety     Arthritis     Dizziness, nonspecific     Generalized headaches     Heart murmur     stable per pt    Hypertension     Knee pain     right    Malnutrition ([facility] [address] [facility] 2014    Migraine headache     Polypharmacy 2014    Reflux gastritis     Rhabdomyolysis 2014    Sinus problem        Past Surgical History:        Procedure Laterality Date    APPENDECTOMY      BACK SURGERY      lumbar    CHOLECYSTECTOMY      HYSTERECTOMY      TOTAL KNEE ARTHROPLASTY  2012    right       Social History:    Social History     Tobacco Use    Smoking status: Former Smoker     Years: 45.00     Quit date: 2007     Years since quittin.3    Smokeless tobacco: Never Used   Substance Use Topics    Alcohol use:  No                                Counseling given: Not Answered      Vital Signs (Current):   Vitals:    21 0142 21 0242 21 0730 21 1056   BP: (!) 147/98 (!) 149/84 137/81 (!) 161/85   Pulse: 84 84 92 99   Resp: 17 19 19 18   Temp: 36.7 °C (98.1 °F) 36.7 °C (98 °F) 36.6 °C (97.8 °F) 36.9 °C (98.5 °F)   TempSrc: Temporal Temporal Oral Axillary   SpO2: 98% 96% 97% 97%   Weight:  147 lb 11.2 oz (67 kg)     Height:                                                  BP Readings from Last 3 Encounters:   21 (!) 161/85   12 115/68       NPO Status: Time of last liquid consumption: 2000                        Time of last solid consumption: 2000                        Date of last liquid consumption: 01/24/21                        Date of last solid food consumption: 01/24/21    BMI:   Wt Readings from Last 3 Encounters:   01/25/21 147 lb 11.2 oz (67 kg)   09/11/12 164 lb (74.4 kg)     Body mass index is 21.81 kg/m². CBC:   Lab Results   Component Value Date    WBC 6.8 01/24/2021    RBC 4.48 01/24/2021    HGB 11.4 01/24/2021    HCT 38.2 01/24/2021    MCV 85.3 01/24/2021    RDW 16.4 01/24/2021     01/24/2021       CMP:   Lab Results   Component Value Date     01/24/2021    K 3.9 01/24/2021     01/24/2021    CO2 26 01/24/2021    BUN 15 01/24/2021    CREATININE 1.1 01/24/2021    GFRAA 57 01/24/2021    LABGLOM 47 01/24/2021    GLUCOSE 105 01/24/2021    GLUCOSE 90 05/16/2012    PROT 7.3 02/27/2019    CALCIUM 9.6 01/24/2021    BILITOT 0.5 02/27/2019    ALKPHOS 115 02/27/2019    AST 37 02/27/2019    ALT 26 02/27/2019       POC Tests: No results for input(s): POCGLU, POCNA, POCK, POCCL, POCBUN, POCHEMO, POCHCT in the last 72 hours.     Coags:   Lab Results   Component Value Date    PROTIME 16.4 01/24/2021    PROTIME 11.5 06/24/2011    INR 1.5 01/24/2021    APTT 31.8 01/24/2021       HCG (If Applicable): No results found for: PREGTESTUR, PREGSERUM, HCG, HCGQUANT     ABGs: No results found for: PHART, PO2ART, VXB4JXN, JLL5TJW, BEART, T3DPFMTD     Type & Screen (If Applicable):  No results found for: LABABO, LABRH    Drug/Infectious Status (If Applicable):  No results found for: HIV, HEPCAB    COVID-19 Screening (If Applicable):   Lab Results   Component Value Date    COVID19 Not Detected 01/24/2021 1/25/21 CT of chest     Anesthesia Evaluation  Patient summary reviewed and Nursing notes reviewed no history of anesthetic complications:   Airway: Mallampati: II  TM distance: >3 FB   Neck ROM: full  Mouth opening: > = 3 FB Dental:    (+) upper dentures and lower dentures      Pulmonary: breath sounds clear to auscultation            Patient did not smoke on day of surgery. Cardiovascular:  Exercise tolerance: poor (<4 METS),   (+) hypertension:,       ECG reviewed  Rhythm: regular  Rate: normal                    Neuro/Psych:   (+) neuromuscular disease:, headaches:, psychiatric history:depression/anxiety             GI/Hepatic/Renal:   (+) GERD:,           Endo/Other:                     Abdominal:       Abdomen: soft. Vascular:                                        Anesthesia Plan      MAC     ASA 4     (Pt has hx of dementia, History obtain from chart and nursing staff )  Induction: intravenous. arterial line    Anesthetic plan and risks discussed with patient. Use of blood products discussed with patient whom. Plan discussed with CRNA and attending. Dania Nj RN   1/25/2021      DOS STAFF ADDENDUM:    Patient seen and chart reviewed. Physical exam and history updated as indicated. NPO status confirmed. Anesthesia options and plan discussed including risks benefits with patient/legal guardian and family as available. Concerns and questions addressed. Consent verbalized to proceed.   Anesthesia plan, options and intraoperative/postoperative concerns discussed with care team.    Martha Steven MD  1/25/2021  1:03 PM

## 2021-01-25 NOTE — PROGRESS NOTES
Pt belongings ( 2 bags shirt shoes pants coat underwear and purse) bagged labeled and placed on pt counter.

## 2021-01-25 NOTE — PROGRESS NOTES
Two rings and watch were removed from patient to go to OR and locked in [facility] drawer. Also called Son, tanja and notified going down to surgery. Will update after.          Lucía Kohler

## 2021-01-25 NOTE — PATIENT CARE CONFERENCE
P Quality Flow/Interdisciplinary Rounds Progress Note        Quality Flow Rounds held on January 25, 2021    Disciplines Attending:  Bedside Nurse, ,  and Nursing Unit Leadership    Suzi Fontaine was admitted on 1/24/2021  7:35 PM    Anticipated Discharge Date:  Expected Discharge Date: 01/28/21    Disposition:    Gerson Score:  Gerson Scale Score: 22    Readmission Risk              Risk of Unplanned Readmission:        10           Discussed patient goal for the day, patient clinical progression, and barriers to discharge.   The following Goal(s) of the Day/Commitment(s) have been identified:  to keep patient calm       Bong Atkins  January 25, 2021

## 2021-01-25 NOTE — ED PROVIDER NOTES
HPI:  1/24/21,   Time: 8:21 PM АЛЕКСАНДР Martin is a 82 y.o. female presenting to the ED for dizziness, beginning 2 days ago. The complaint has been intermittent, mild in severity, and worsened by nothing. The patient states that over the last 2 days she has been having intermittent dizziness with a sensation of the room spinning. She states that it happens abruptly. She states that nothing really triggers it that she can think of. She states that she took Dramamine after having an episode which made it worse therefore she came to the ED to be evaluated. She denies any chest pain. She states she has been getting short of breath with ambulation. No numbness tingling. No recent sickness. Otherwise asymptomatic. Currently she has no vertigo. Review of Systems:   Pertinent positives and negatives are stated within HPI, all other systems reviewed and are negative.          --------------------------------------------- PAST HISTORY ---------------------------------------------  Past Medical History:  has a past medical history of Accidental drug overdose, Acute delirium, Anxiety, Arthritis, Dizziness, nonspecific, Generalized headaches, Heart murmur, Hypertension, Knee pain, Malnutrition ([address] Migraine headache, Polypharmacy, Reflux gastritis, Rhabdomyolysis, and Sinus problem. Past Surgical History:  has a past surgical history that includes Appendectomy; Cholecystectomy; Hysterectomy; back surgery (2007); and Total knee arthroplasty (9/17/2012). Social History:  reports that she quit smoking about 13 years ago. She quit after 45.00 years of use. She has never used smokeless tobacco. She reports that she does not drink alcohol or use drugs. Family History: family history is not on file. The patients home medications have been reviewed. Allergies:  Other        ---------------------------------------------------PHYSICAL EXAM--------------------------------------    Constitutional/General: Alert and oriented x3, thin  Head: Normocephalic and atraumatic  Eyes: PERRL, EOMI, conjunctive normal, sclera non icteric  Mouth: Oropharynx clear, handling secretions, no trismus, no asymmetry of the posterior oropharynx or uvular edema, dry mucous membranes  Neck: Supple, full ROM, non tender to palpation in the midline, no stridor, no crepitus, no meningeal signs  Respiratory: Diminished breath sounds at bases no wheezes, rales, or rhonchi. Not in respiratory distress  Cardiovascular:  Regular rate. Regular rhythm. No murmurs, gallops, or rubs. 2+ distal pulses  GI:  Abdomen Soft, Non tender, Non distended. +BS. No organomegaly, no palpable masses,  No rebound, guarding, or rigidity. Musculoskeletal: Moves all extremities x 4. Warm and well perfused, no clubbing, cyanosis, or edema. Capillary refill <3 seconds  Integument: skin warm and dry. No rashes. Neurologic: GCS 15, no focal deficits, symmetric strength 5/5 in the upper and lower extremities bilaterally  Psychiatric: Normal Affect    -------------------------------------------------- RESULTS -------------------------------------------------  I have personally reviewed all laboratory and imaging results for this patient. Results are listed below.      LABS:  Results for orders placed or performed during the hospital encounter of 01/24/21   CBC Auto Differential   Result Value Ref Range    WBC 6.8 4.5 - 11.5 E9/L    RBC 4.48 3.50 - 5.50 E12/L    Hemoglobin 11.4 (L) 11.5 - 15.5 g/dL    Hematocrit 38.2 34.0 - 48.0 %    MCV 85.3 80.0 - 99.9 fL    MCH 25.4 (L) 26.0 - 35.0 pg    MCHC 29.8 (L) 32.0 - 34.5 %    RDW 16.4 (H) 11.5 - 15.0 fL    Platelets 705 994 - 184 E9/L    MPV 13.2 (H) 7.0 - 12.0 fL    Neutrophils % 76.2 43.0 - 80.0 %    Immature Granulocytes % 0.1 0.0 - 5.0 %    Lymphocytes % 11.9 (L) 20.0 - 42.0 %    Monocytes % 9.6 2.0 - 12.0 %    Eosinophils % 1.6 0.0 - 6.0 %    Basophils % 0.6 0.0 - 2.0 %    Neutrophils Absolute 5.17 1.80 - 7.30 E9/L    Immature Granulocytes # 0.01 E9/L    Lymphocytes Absolute 0.81 (L) 1.50 - 4.00 E9/L    Monocytes Absolute 0.65 0.10 - 0.95 E9/L    Eosinophils Absolute 0.11 0.05 - 0.50 E9/L    Basophils Absolute 0.04 0.00 - 0.20 J6/M   Basic Metabolic Panel w/ Reflex to MG   Result Value Ref Range    Sodium 139 132 - 146 mmol/L    Potassium reflex Magnesium 3.9 3.5 - 5.0 mmol/L    Chloride 102 98 - 107 mmol/L    CO2 26 22 - 29 mmol/L    Anion Gap 11 7 - 16 mmol/L    Glucose 105 (H) 74 - 99 mg/dL    BUN 15 8 - 23 mg/dL    CREATININE 1.1 (H) 0.5 - 1.0 mg/dL    GFR Non-African American 47 >=60 mL/min/1.73    GFR African American 57     Calcium 9.6 8.6 - 10.2 mg/dL   Troponin   Result Value Ref Range    Troponin <0.01 0.00 - 0.03 ng/mL   Urinalysis, reflex to microscopic   Result Value Ref Range    Color, UA Yellow Straw/Yellow    Clarity, UA Clear Clear    Glucose, Ur Negative Negative mg/dL    Bilirubin Urine Negative Negative    Ketones, Urine Negative Negative mg/dL    Specific Gravity, UA <=1.005 1.005 - 1.030    Blood, Urine SMALL (A) Negative    pH, UA 6.0 5.0 - 9.0    Protein, UA Negative Negative mg/dL    Urobilinogen, Urine 0.2 <2.0 E.U./dL    Nitrite, Urine Negative Negative    Leukocyte Esterase, Urine Negative Negative   Protime-INR   Result Value Ref Range    Protime 16.4 (H) 9.3 - 12.4 sec    INR 1.5    APTT   Result Value Ref Range    aPTT 31.8 24.5 - 35.1 sec   COVID-19   Result Value Ref Range    SARS-CoV-2, NAAT Not Detected Not Detected   Microscopic Urinalysis   Result Value Ref Range    WBC, UA 0-1 0 - 5 /HPF    RBC, UA 0-1 0 - 2 /HPF    Bacteria, UA NONE SEEN None Seen /HPF   Brain Natriuretic Peptide   Result Value Ref Range    Pro-BNP 1,535 (H) 0 - 450 pg/mL       RADIOLOGY:  Interpreted by Radiologist.  CT CHEST WO CONTRAST   Final Result   Massive pericardial effusion with significant constriction of the heart   highly concerning for pericardial tamponade. Moderate left and small right pericardial pleural effusion . Focal consolidative changes of the left lower lobe and the medial aspect of   the lingula may be related to atelectasis due to adjacent effusion or may   represent pneumonia. Critical results were called by Dr. Shana Mccauley MD to Burt Hull   on 1/25/2021 at 00:27. XR CHEST (2 VW)   Final Result   Stable cardiomegaly with increasing central pulmonary congestion. Hazy left basilar atelectasis and/or consolidation and probable mild to   moderate left pleural effusion which is unchanged   Increasing right infrahilar opacity extending inferiorly. CT Head WO Contrast   Final Result   Mild atrophy and moderate chronic microischemic disease throughout the deep   white matter which is unchanged with no acute abnormality seen. XR CHEST PORTABLE   Final Result   Mild cardiomegaly and mild central pulmonary congestion which is more   prominent. Questionable moderate left pleural effusion and associated left basilar   atelectasis and consolidation. Suggest a follow-up PA and lateral chest   Hazy right basilar opacity medially which could represent atelectasis or   infiltrate. EKG: This EKG is signed and interpreted by the EP. EKG shows sinus rhythm with premature supraventricular complexes. Nonspecific ST and T wave changes noted. 9 9 bpm.  No STEMI.    ------------------------- NURSING NOTES AND VITALS REVIEWED ---------------------------   The nursing notes within the ED encounter and vital signs as below have been reviewed by myself. BP (!) 142/96   Pulse 84   Temp 98 °F (36.7 °C) (Temporal)   Resp 17   Ht 5' 9\" (1.753 m)   Wt 160 lb (72.6 kg)   SpO2 98%   BMI 23.63 kg/m²   Oxygen Saturation Interpretation: Normal    The patients available past medical records and past encounters were reviewed.         ------------------------------ ED COURSE/MEDICAL DECISION MAKING----------------------  Medications   doxycycline (VIBRAMYCIN) 100 mg in dextrose 5 % 100 mL IVPB (100 mg Intravenous New Bag 1/25/21 0109)   sodium chloride flush 0.9 % injection 10 mL (has no administration in time range)   sodium chloride flush 0.9 % injection 10 mL (has no administration in time range)   enoxaparin (LOVENOX) injection 40 mg (has no administration in time range)   acetaminophen (TYLENOL) tablet 650 mg (has no administration in time range)   0.9 % sodium chloride bolus (0 mLs Intravenous Stopped 1/24/21 2244)   cefTRIAXone (ROCEPHIN) 1,000 mg in sterile water 10 mL IV syringe (0 mg Intravenous Stopped 1/25/21 0109)         ED COURSE:       Medical Decision Making: This is an 31-year-old female presents to the ED for dizziness. Patient underwent laboratory work-up which showed a normal CBC except for hemoglobin 11.4. Normal chemistry. Normal troponin. Urinalysis unremarkable. Covid test negative. BNP was elevated 1535. Patient's head CT unremarkable. Chest x-ray showed cardiomegaly pulmonary congestion and bilateral pleural effusions. However the patient underwent CT which showed a massive pericardial effusion with significant constriction of the heart highly concerning for pericardial tamponade. Moderate left and right  pleural effusions. Focal consolidations noted. Therefore the patient was covered with antibiotics. Clinically the patient has stable vital signs with no signs of cardiac tamponade per the patient's vital signs but due to the massive pericardial effusion I did speak to cardiothoracic surgery Dr. Bienvenido Perez. Discussed case. Due to the patient being hemodynamically stable no need for stat echo at this time. Case discussed with Dr. Gilford Carder who is excepted the patient for admission.       I, Dr. Mayte Mayen, am the primary provider for this encounter    This patient's ED course included: a personal history and physicial examination, re-evaluation prior to disposition, multiple bedside re-evaluations, IV medications, cardiac monitoring and continuous pulse oximetry    This patient has remained hemodynamically stable during their ED course. Re-Evaluations:             Re-evaluation. Patients symptoms show no change      Counseling: The emergency provider has spoken with the patient and discussed todays results, in addition to providing specific details for the plan of care and counseling regarding the diagnosis and prognosis. Questions are answered at this time and they are agreeable with the plan.       --------------------------------- IMPRESSION AND DISPOSITION ---------------------------------    IMPRESSION  1. Pericardial effusion    2. Pleural effusion    3. Pneumonia due to organism        DISPOSITION  Disposition: Admit to telemetry  Patient condition is stable    NOTE: This report was transcribed using voice recognition software.  Every effort was made to ensure accuracy; however, inadvertent computerized transcription errors may be present        Vinny Daniels DO  01/25/21 0129

## 2021-01-25 NOTE — H&P
History and Physical      CHIEF COMPLAINT:   dizzy      HISTORY OF PRESENT ILLNESS:       History reviewed with the patients son and PCP. The patient is a 82 y.o. female patient of Dr. Denise Ardon presents with a dizzy feeling over the past 2 weeks and wiith vertigo(room spinning) immediately prior to the ER visit. She self tx with Dramamine w/o improvement. There is no past history cardiac or pulmonary disorders with the exception of a known heart murmur. She denies fever, chills or sweats. The patient is cognitively impaired d/t dementia. She is on medication for HTN and morphine for chronic low back pain. Past Medical History:    Past Medical History:   Diagnosis Date    Accidental drug overdose 5/21/2014    Acute delirium 12/26/2014    Anxiety     Arthritis     Dizziness, nonspecific     Generalized headaches     Heart murmur     stable per pt    Hypertension     Knee pain     right    Malnutrition ([address] 12/26/2014    Migraine headache     Polypharmacy 12/26/2014    Reflux gastritis     Rhabdomyolysis 5/21/2014    Sinus problem        Past Surgical History:    Past Surgical History:   Procedure Laterality Date    APPENDECTOMY      BACK SURGERY  2007    lumbar    CHOLECYSTECTOMY      HYSTERECTOMY      TOTAL KNEE ARTHROPLASTY  9/17/2012    right       Medications Prior to Admission:    Medications Prior to Admission: morphine (MS CONTIN) 15 MG extended release tablet, Take 15 mg by mouth daily. Cholecalciferol (VITAMIN D3) 1.25 MG (18486 UT) CAPS, Take by mouth once a week  aspirin 325 MG EC tablet, Take 1 tablet by mouth daily. amLODIPine (NORVASC) 10 MG tablet, Take 1 tablet by mouth daily. (Patient taking differently: Take 5 mg by mouth daily )  lisinopril (PRINIVIL;ZESTRIL) 10 MG tablet, Take 1 tablet by mouth daily.   venlafaxine (EFFEXOR) 37.5 MG tablet, Take 75 mg by mouth daily   [DISCONTINUED] HYDROcodone-acetaminophen (LORTAB) 7.5-500 MG per tablet, Take 1 tablet by mouth every 6 hours as needed. [DISCONTINUED] bismuth subsalicylate (PEPTO BISMOL) 262 MG/15ML suspension, Take 2.5 mLs by mouth once. [DISCONTINUED] LORazepam (ATIVAN) 1 MG tablet, Take 1 mg by mouth 2 times daily. [DISCONTINUED] omeprazole (PRILOSEC) 20 MG capsule, Take 20 mg by mouth daily as needed. Allergies: Other    Social History:    reports that she quit smoking about 13 years ago. She quit after 45.00 years of use. She has never used smokeless tobacco. She reports that she does not drink alcohol or use drugs. Family History:   family history is not on file. REVIEW OF SYSTEMS:  As above in the HPI, otherwise negative    PHYSICAL EXAM:    Vitals:  /70   Pulse 97   Temp 98.9 °F (37.2 °C) (Temporal)   Resp 20   Ht 5' 9\" (1.753 m)   Wt 147 lb 11.2 oz (67 kg)   SpO2 95%   BMI 21.81 kg/m²     General:   Awake, alert, oriented X 3. Severe anxiety d/t pending surgery  HEENT:    Normocephalic, atraumatic. Pupils equal, round, reactive to light. No scleral icterus. No conjunctival injection. Oropharynx clear. No nasal discharge. Marked NVD  Neck:       Supple. No bruits, adenopathy, masses, neck vein distention. Trachea in the midline. Heart:      RRR, right parasternal systolic murmur, no gallops, rubs  Lungs:     Diminished but clear bilaterally, bilat symmetrical expansion, no wheeze, rales, or rhonchi  Abdomen:   Bowel sounds present, soft, nontender, no masses, no organomegaly, no peritoneal signs  Extremities:  No clubbing, cyanosis, or edema  Skin:         Warm and dry, no open lesions or rash  Neuro:     Cranial nerves 2-12 intact, no focal deficits  Breast:    deferred  Rectal:    deferred  Genitalia:  deferred    LABS:    CBC with Differential:    Lab Results   Component Value Date    WBC 5.7 01/25/2021    RBC 4.32 01/25/2021    HGB 11.1 01/25/2021    HCT 35.8 01/25/2021     01/25/2021    MCV 82.9 01/25/2021    MCH 25.7 01/25/2021    MCHC 31.0 01/25/2021    RDW 16.4 01/25/2021    SEGSPCT 72 02/22/2014    LYMPHOPCT 11.9 01/24/2021    MONOPCT 9.6 01/24/2021    BASOPCT 0.6 01/24/2021    MONOSABS 0.65 01/24/2021    LYMPHSABS 0.81 01/24/2021    EOSABS 0.11 01/24/2021    BASOSABS 0.04 01/24/2021     CMP:    Lab Results   Component Value Date     01/25/2021    K 3.8 01/25/2021    K 3.9 01/24/2021     01/25/2021    CO2 23 01/25/2021    BUN 12 01/25/2021    CREATININE 0.8 01/25/2021    GFRAA >60 01/25/2021    LABGLOM >60 01/25/2021    GLUCOSE 105 01/25/2021    GLUCOSE 90 05/16/2012    PROT 7.3 02/27/2019    LABALBU 4.5 02/27/2019    LABALBU 4.2 05/16/2012    CALCIUM 9.1 01/25/2021    BILITOT 0.5 02/27/2019    ALKPHOS 115 02/27/2019    AST 37 02/27/2019    ALT 26 02/27/2019     TSH:    Lab Results   Component Value Date    TSH 1.770 01/25/2021       ASSESSMENT:      Active Hospital Problems    Diagnosis Date Noted    Pericardial effusion [I31.3] 01/25/2021    Pain syndrome, chronic [G89.4] 01/25/2021    Essential hypertension, benign [I10] 05/22/2014    Dementia ([facility] [address] [F03.90] 05/22/2014        PLAN:  Admit to a monitored floor               CTS consult               Sed rate and CRP               TSH    Travis Moreno DO  6:18 PM  1/25/2021     Updated: Hospital Course:  History reviewed with the patients son and PCP. The patient is a 82 y.o. female patient of Dr. Isidro Palmer presents with a dizzy feeling over the past 2 weeks and wiith vertigo(room spinning) immediately prior to the ER visit. She self tx with Dramamine w/o improvement. There is no past history of cardiac or pulmonary disorders with the exception of a known heart murmur. She denied  fever, chills or sweats. The patient is cognitively impaired d/t dementia. She is on medication for HTN and morphine for chronic low back pain. Upon admission a massive pericardial effusion was identified. Urgent pericardiocentesis was performed with 1405 cc fluid removed.  Echo also confirmed critical aortic stenosis with a 100 mmHg gradient. Above findings d/w with the patients son. Final family decisions are pending at this time on further investigation in view of her mental status.  The patient is discharged to [facility] in improved and stable condition with OP f/u as directed.

## 2021-01-25 NOTE — CONSULTS
CTS Consult    Patient name: Jayme Hou    Reason for consult: Pericardial effusion    Referring Physician: Dr. Mare Zuniga    Primary Care Physician: Dinah Bañuelos DO    Date of service: 1/25/2021    Chief Complaint: Dizziness    HPI: 82year old female seen in ER for dizziness. She had a CXR showing widened mediastinum prompting a CT which shows a large pericardial effusion. She is entirely hemodynamically stable including a HR of 84 and SBP of 150. She denies CP, SOB, THOMPSON, LE edema, N/V, F/C, orthopnea, PND, and syncope. Allergies:    Allergies   Allergen Reactions    Other Rash     \"A type of IVP dye from the 50's\"       Home medications:    Current Facility-Administered Medications   Medication Dose Route Frequency Provider Last Rate Last Admin    sodium chloride flush 0.9 % injection 10 mL  10 mL Intravenous 2 times per day Travis Moreno DO        sodium chloride flush 0.9 % injection 10 mL  10 mL Intravenous PRN Travis Moreno DO        enoxaparin (LOVENOX) injection 40 mg  40 mg Subcutaneous Daily Travis Moreno DO        acetaminophen (TYLENOL) tablet 650 mg  650 mg Oral Q4H PRN Travis Moreno DO   650 mg at 01/25/21 0250       Past Medical History:  Past Medical History:   Diagnosis Date    Accidental drug overdose 5/21/2014    Acute delirium 12/26/2014    Anxiety     Arthritis     Dizziness, nonspecific     Generalized headaches     Heart murmur     stable per pt    Hypertension     Knee pain     right    Malnutrition ([address] 12/26/2014    Migraine headache     Polypharmacy 12/26/2014    Reflux gastritis     Rhabdomyolysis 5/21/2014    Sinus problem        Past Surgical History:  Past Surgical History:   Procedure Laterality Date    APPENDECTOMY      BACK SURGERY  2007    lumbar    CHOLECYSTECTOMY      HYSTERECTOMY      TOTAL KNEE ARTHROPLASTY  9/17/2012    right       Social History:  Social History     Socioeconomic History    Marital status:  Spouse name: Not on file    Number of children: Not on file    Years of education: Not on file    Highest education level: Not on file   Occupational History    Not on file   Social Needs    Financial resource strain: Not on file    Food insecurity     Worry: Not on file     Inability: Not on file    Transportation needs     Medical: Not on file     Non-medical: Not on file   Tobacco Use    Smoking status: Former Smoker     Years: 45.00     Quit date: 2007     Years since quittin.3    Smokeless tobacco: Never Used   Substance and Sexual Activity    Alcohol use: No    Drug use: No    Sexual activity: Not on file   Lifestyle    Physical activity     Days per week: Not on file     Minutes per session: Not on file    Stress: Not on file   Relationships    Social connections     Talks on phone: Not on file     Gets together: Not on file     Attends Synagogue service: Not on file     Active member of club or organization: Not on file     Attends meetings of clubs or organizations: Not on file     Relationship status: Not on file    Intimate partner violence     Fear of current or ex partner: Not on file     Emotionally abused: Not on file     Physically abused: Not on file     Forced sexual activity: Not on file   Other Topics Concern    Not on file   Social History Narrative    Not on file       Family History:  History reviewed. No pertinent family history. Review of Systems:  Constitutional: Denies fevers, chills, or weight loss. HEENT: Denies visual changes or hearing loss. Heart: As per HPI. Lungs: Denies shortness of breath, cough, or wheezing. Gastrointestinal: Denies nausea, vomiting, constipation, or diarrhea. Genitourinary: dysuria or hematuria. Psychiatric: Patient denies anxiety or depression. Neurologic: Patient denies weakness of the extremities, dizziness, or headaches. All other ROS checked and found to be negative.     Labs:  Recent Labs     21   WBC

## 2021-01-26 LAB
ANION GAP SERPL CALCULATED.3IONS-SCNC: 9 MMOL/L (ref 7–16)
BUN BLDV-MCNC: 14 MG/DL (ref 8–23)
CALCIUM SERPL-MCNC: 8.3 MG/DL (ref 8.6–10.2)
CHLORIDE BLD-SCNC: 107 MMOL/L (ref 98–107)
CO2: 25 MMOL/L (ref 22–29)
CREAT SERPL-MCNC: 0.8 MG/DL (ref 0.5–1)
GFR AFRICAN AMERICAN: >60
GFR NON-AFRICAN AMERICAN: >60 ML/MIN/1.73
GLUCOSE BLD-MCNC: 107 MG/DL (ref 74–99)
GRAM STAIN ORDERABLE: NORMAL
HCT VFR BLD CALC: 38.3 % (ref 34–48)
HEMOGLOBIN: 11.8 G/DL (ref 11.5–15.5)
MCH RBC QN AUTO: 25.8 PG (ref 26–35)
MCHC RBC AUTO-ENTMCNC: 30.8 % (ref 32–34.5)
MCV RBC AUTO: 83.8 FL (ref 80–99.9)
PDW BLD-RTO: 16.6 FL (ref 11.5–15)
PLATELET # BLD: 137 E9/L (ref 130–450)
PMV BLD AUTO: 12.1 FL (ref 7–12)
POTASSIUM SERPL-SCNC: 3.5 MMOL/L (ref 3.5–5)
RBC # BLD: 4.57 E12/L (ref 3.5–5.5)
SODIUM BLD-SCNC: 141 MMOL/L (ref 132–146)
WBC # BLD: 10.7 E9/L (ref 4.5–11.5)

## 2021-01-26 PROCEDURE — 97161 PT EVAL LOW COMPLEX 20 MIN: CPT

## 2021-01-26 PROCEDURE — 6360000002 HC RX W HCPCS: Performed by: THORACIC SURGERY (CARDIOTHORACIC VASCULAR SURGERY)

## 2021-01-26 PROCEDURE — 36415 COLL VENOUS BLD VENIPUNCTURE: CPT

## 2021-01-26 PROCEDURE — 2580000003 HC RX 258: Performed by: THORACIC SURGERY (CARDIOTHORACIC VASCULAR SURGERY)

## 2021-01-26 PROCEDURE — 85027 COMPLETE CBC AUTOMATED: CPT

## 2021-01-26 PROCEDURE — 6370000000 HC RX 637 (ALT 250 FOR IP): Performed by: THORACIC SURGERY (CARDIOTHORACIC VASCULAR SURGERY)

## 2021-01-26 PROCEDURE — 80048 BASIC METABOLIC PNL TOTAL CA: CPT

## 2021-01-26 PROCEDURE — 97530 THERAPEUTIC ACTIVITIES: CPT

## 2021-01-26 PROCEDURE — 2140000000 HC CCU INTERMEDIATE R&B

## 2021-01-26 RX ADMIN — OXYCODONE 5 MG: 5 TABLET ORAL at 13:17

## 2021-01-26 RX ADMIN — ACETAMINOPHEN 650 MG: 325 TABLET ORAL at 22:25

## 2021-01-26 RX ADMIN — ACETAMINOPHEN 650 MG: 325 TABLET ORAL at 05:10

## 2021-01-26 RX ADMIN — ACETAMINOPHEN 650 MG: 325 TABLET ORAL at 10:59

## 2021-01-26 RX ADMIN — DOCUSATE SODIUM 50 MG AND SENNOSIDES 8.6 MG 1 TABLET: 8.6; 5 TABLET, FILM COATED ORAL at 08:38

## 2021-01-26 RX ADMIN — Medication 2000 MG: at 05:21

## 2021-01-26 RX ADMIN — SODIUM CHLORIDE, PRESERVATIVE FREE 10 ML: 5 INJECTION INTRAVENOUS at 05:21

## 2021-01-26 RX ADMIN — VENLAFAXINE HYDROCHLORIDE 75 MG: 75 TABLET ORAL at 08:38

## 2021-01-26 RX ADMIN — SODIUM CHLORIDE, POTASSIUM CHLORIDE, SODIUM LACTATE AND CALCIUM CHLORIDE: 600; 310; 30; 20 INJECTION, SOLUTION INTRAVENOUS at 17:30

## 2021-01-26 RX ADMIN — ASPIRIN 325 MG: 325 TABLET, COATED ORAL at 08:38

## 2021-01-26 RX ADMIN — LISINOPRIL 10 MG: 10 TABLET ORAL at 08:38

## 2021-01-26 RX ADMIN — OXYCODONE 5 MG: 5 TABLET ORAL at 05:10

## 2021-01-26 RX ADMIN — DOCUSATE SODIUM 50 MG AND SENNOSIDES 8.6 MG 1 TABLET: 8.6; 5 TABLET, FILM COATED ORAL at 22:26

## 2021-01-26 RX ADMIN — SODIUM CHLORIDE, POTASSIUM CHLORIDE, SODIUM LACTATE AND CALCIUM CHLORIDE: 600; 310; 30; 20 INJECTION, SOLUTION INTRAVENOUS at 01:15

## 2021-01-26 RX ADMIN — ACETAMINOPHEN 650 MG: 325 TABLET ORAL at 16:38

## 2021-01-26 RX ADMIN — ENOXAPARIN SODIUM 40 MG: 40 INJECTION SUBCUTANEOUS at 08:38

## 2021-01-26 ASSESSMENT — PAIN SCALES - GENERAL
PAINLEVEL_OUTOF10: 0
PAINLEVEL_OUTOF10: 0
PAINLEVEL_OUTOF10: 4
PAINLEVEL_OUTOF10: 5
PAINLEVEL_OUTOF10: 4

## 2021-01-26 ASSESSMENT — PAIN - FUNCTIONAL ASSESSMENT: PAIN_FUNCTIONAL_ASSESSMENT: ACTIVITIES ARE NOT PREVENTED

## 2021-01-26 ASSESSMENT — PAIN DESCRIPTION - FREQUENCY: FREQUENCY: INTERMITTENT

## 2021-01-26 ASSESSMENT — PAIN DESCRIPTION - PROGRESSION: CLINICAL_PROGRESSION: NOT CHANGED

## 2021-01-26 ASSESSMENT — PAIN DESCRIPTION - DESCRIPTORS: DESCRIPTORS: TINGLING

## 2021-01-26 ASSESSMENT — PAIN DESCRIPTION - ONSET: ONSET: GRADUAL

## 2021-01-26 NOTE — PATIENT CARE CONFERENCE
[facility] Quality Flow/Interdisciplinary Rounds Progress Note        Quality Flow Rounds held on January 26, 2021    Disciplines Attending:  Bedside Nurse, ,  and Nursing Unit Leadership    Margot Edwards was admitted on 1/24/2021  7:35 PM    Anticipated Discharge Date:  Expected Discharge Date: 01/28/21    Disposition:    Gerson Score:  Gerson Scale Score: 18    Readmission Risk              Risk of Unplanned Readmission:        11           Discussed patient goal for the day, patient clinical progression, and barriers to discharge.   The following Goal(s) of the Day/Commitment(s) have been identified:  to keep chest tube intact until removed by doctor Anthony Salazar  January 26, 2021

## 2021-01-26 NOTE — PLAN OF CARE
Problem: Pain:  Goal: Pain level will decrease  Description: Pain level will decrease  Outcome: Met This Shift  Goal: Control of acute pain  Description: Control of acute pain  Outcome: Met This Shift  Goal: Control of chronic pain  Description: Control of chronic pain  Outcome: Met This Shift     Problem: Falls - Risk of:  Goal: Will remain free from falls  Description: Will remain free from falls  1/26/2021 1623 by Hina Bermudez RN  Outcome: Met This Shift  1/26/2021 0916 by Eloisa Echevarria RN  Outcome: Ongoing  Goal: Absence of physical injury  Description: Absence of physical injury  Outcome: Met This Shift     Problem: Skin Integrity:  Goal: Will show no infection signs and symptoms  Description: Will show no infection signs and symptoms  Outcome: Met This Shift  Goal: Absence of new skin breakdown  Description: Absence of new skin breakdown  Outcome: Met This Shift     Problem: Musculor/Skeletal Functional Status  Goal: Absence of falls  Outcome: Met This Shift     Problem: Musculor/Skeletal Functional Status  Goal: Highest potential functional level  Outcome: Ongoing

## 2021-01-26 NOTE — PROGRESS NOTES
POD#1  Awake, alert. No complaints. Denies CP, palpitations, SOB at rest, dizziness/lightheadedness. Sitting up in bed eating     Vitals:    01/25/21 2007 01/25/21 2129 01/26/21 0028 01/26/21 0506   BP: 123/71 (!) 118/59 120/71 122/74   Pulse: 90 96 91 88   Resp: 18 18 16 16   Temp: 99.3 °F (37.4 °C) 98.6 °F (37 °C) 97.8 °F (36.6 °C) 99 °F (37.2 °C)   TempSrc: Axillary Axillary Temporal Temporal   SpO2: 96%  92% 93%   Weight:       Height:         RA      Intake/Output Summary (Last 24 hours) at 1/26/2021 0826  Last data filed at 1/26/2021 0659  Gross per 24 hour   Intake 1351 ml   Output 3010 ml   Net -1659 ml               CT output: 650cc/24 hr      Recent Labs     01/24/21 2022 01/25/21  1418 01/26/21  0551   WBC 6.8 5.7 10.7   HGB 11.4* 11.1* 11.8   HCT 38.2 35.8 38.3    128* 137      Recent Labs     01/24/21 2022 01/25/21  1418 01/26/21  0551   BUN 15 12 14   CREATININE 1.1* 0.8 0.8           Telemetry: NSR      PE  Cardiac: RRR  Lungs: decreased bases  Chest Chest tubes x 1 present and secure.  Very mild swelling post to drainage tube  Abd: Soft, nontender, +BS  Ext: GALVAN            A/P: POD# 1 s/p Ultrasound guided pericardiocentesis with drain    VSS  Maintaining NSR  Cont CT today, still draining   Hgb stable at 11.8  Cont supportive care    Discussed with attending surgeon

## 2021-01-26 NOTE — PROGRESS NOTES
Physical Therapy  Physical Therapy Initial Assessment     Name: Misti Padilla  : 1935  MRN: [id number]    Referring Provider:  Martha Kmi MD    Date of Service: 2021    Evaluating PT:  Miguelito Joseph PT, DPT [id number]    Room #:  7009/7095-D  Diagnosis:  Pericardial effusion  Precautions: Falls, sitter, chest tube  Procedure/Surgery:   US guided pericardiocentesis with drain  PMHx/PSHx:  HTN  Equipment Needs:  TBD    SUBJECTIVE:    Pt lives alone in a 2 story home with 3 stairs to enter. Full flight of steps and 1 rail to bedroom. Pt ambulated without device and was independent with ADLs PTA. Son assisted with medication. OBJECTIVE:   Initial Evaluation  Date: 21 Treatment Short Term/ Long Term   Goals   AM-PAC 6 Clicks 85/06     Was pt agreeable to Eval/treatment? Yes     Does pt have pain? 3-4/10 L neck and B knee pain     Bed Mobility  Rolling: NT  Supine to sit: SBA with HOB elevated  Sit to supine: SBA  Scooting: SBA  Mod Independent   Transfers Sit to stand: Jerrod  Stand to sit: Jerrod  Stand pivot: NT  Mod Independent with AAD   Ambulation   3 feet F/B with Jerrod with [employer]  >150 feet with Mod Independent with AAD   Stair negotiation: ascended and descended NT  >10 steps with 1 rail Mod Independent   ROM BUE:  WFL  BLE:  WFL     Strength BUE:  WFL  BLE:  4/5  Increase by 1/3 MMT grade   Balance Sitting EOB:  Jerrod dynamic  Dynamic Standing:  Jerrod with [employer]  Sitting EOB:  Independent  Dynamic Standing: Mod Independent with AAD     Pt is A & O x 2 (Self and time)  Sensation: tingling in neck and head  Edema:  None    Therapeutic Exercises:  NA    Patient education  Pt educated on safety    Patient response to education:   Pt verbalized understanding Pt demonstrated skill Pt requires further education in this area   partial partial yes     ASSESSMENT:    Comments:  Pt was in bed upon arrival, agreeable to initial evaluation. Sitter present for session.   Pt required redirection to tasks and reorientation to place frequently throughout session. Pt was very tangential.  Encouragement required to complete tasks as pt wanted to get dressed first.  Pt completed a few steps from EOB before complaining of dizziness and requesting to sit down. Further activity was deferred. Pt was left in bed with all needs met and call light in reach. All lines remained intact. Treatment:  Patient practiced and was instructed in the following treatment:     Bed mobility training - pt given verbal and tactile cues to facilitate proper sequencing and safety during supine>sit as well as provided with physical assistance to complete task    Sitting EOB for >8 minutes for upright tolerance, postural awareness and BLE ROM   Transfer training - pt was given verbal and tactile cues to facilitate proper hand placement, technique and safety during sit to stand and stand to sit as well as provided with physical assistance to complete task.  Gait training- pt was given verbal and tactile cues to facilitate safety, balance and use of AD during ambulation as well as provided with physical assistance to complete task. Pt's/ family goals   1. Return home    Patient and or family understand(s) diagnosis, prognosis, and plan of care. Yes    PLAN OF CARE:    Current Treatment Recommendations     [x] Strengthening     [] ROM   [x] Balance Training   [x] Endurance Training   [x] Transfer Training   [x] Gait Training   [x] Stair Training   [] Positioning   [x] Safety and Education Training   [x] Patient/Caregiver Education   [] HEP  [] Other     Frequency of treatments: 2-5x/week x 1-2 weeks.     Time in  0747  Time out  0810    Total Treatment Time  18 minutes     Evaluation Time includes thorough review of current medical information, gathering information on past medical history/social history and prior level of function, completion of standardized testing/informal observation of tasks, assessment of data and education on plan of care and goals.     CPT codes:  [x] Low Complexity PT evaluation 24461  [] Moderate Complexity PT evaluation 39737  [] High Complexity PT evaluation 38752  [] PT Re-evaluation 21449  [] Gait training 75059 - minutes  [] Manual therapy 50843 - minutes  [x] Therapeutic activities 77998 18 minutes  [] Therapeutic exercises 73775 - minutes  [] Neuromuscular reeducation 86732 - minutes     Francisca Brenner, PT, DPT  [id number]

## 2021-01-26 NOTE — PROGRESS NOTES
Cardiothoracic Surgery   1/25/2021    Subjective:  Patient was seen and examined. I evaluated drain placement as well as area of concern posterior to the drain. There was some edematous change posterior to the drain site on the left side, possibly chronic in nature. The drain and the dressing looked clean and intact. There is no crepitus or skin changes over this area. Patient did not complain of any pain on palpation over this corresponding area. Patient's vitals are currently stable. Patient does not seem to be in any acute distress. We will continue to monitor area.     Electronically signed by Demetrius Queen DO on 1/25/2021 at 10:08 PM

## 2021-01-26 NOTE — PROGRESS NOTES
2043 Messaged Dr. Mirella Pacheco re: large hard lump around the CT insertion site. No airleak No sub q  VSS. No orders at this time. Surgical resident will come up to assess. Was not there prior assessment will continue to monitor. 2051  Messaged Dr. Ramón Davis re: to assess patient per CTS. Will continue to monitor. Dorie Davis re: to assess patient per CTS. Will continue to monitor.  VSS    Zulay Hodge

## 2021-01-26 NOTE — CARE COORDINATION
SOCIAL WORK/CASEMANAGEMENT TRANSITION OF CARE [id number] [facility], [address], Cathleen Kahn, [phone]): called tanja the son to go over PT and OT raina. Pt is very confused today. I had a long conversation with her and sitter was present. I want to make sure that pt has 24/7 care in place if remains confused and doesn't progress with therapy. Will offer eliazar. Pt with chest tube from pericardiocentesis and on iv ancef. Pt has dx of dementia. Cheyenne Gutiérrez  1/26/2021  Nohemi Pimentel the son called back and he wants pt to go to [facility] if not able to go home alone and needs therapy. He is interested in [facility] or [facility] since she has been there before . Rep has a bed at [facility] and is covid free. She will assess and get back with me in a.m. 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  Cheyenne Gutiérrez  1/26/2021

## 2021-01-26 NOTE — DISCHARGE INSTR - COC
Continuity of Care Form    Patient Name: Ignacia Echevarria   :  1935  MRN:  [id number]    Admit date:  2021  Discharge date:      Code Status Order: Full Code   Advance Directives:   [facility] Documentation       Date/Time Healthcare Directive Type of Healthcare Directive Copy in [facility] [address] Agent's Name Healthcare Agent's Phone Number    [phone]  Yes, patient has an advance directive for healthcare treatment  Durable power of  for health care  No, copy requested from family  Healthcare power of   Mason Staton  --            Admitting Physician:  Ludmila Diaz DO  PCP: Sobia Nassar DO    Discharging Nurse: KARISSA Discharging Hospital Unit/Room#: 9356/4365-A  Discharging Unit Phone Number: [phone]    Emergency Contact:   Extended Emergency Contact Information  Primary Emergency Contact: Cayden Stoo  Address: [address] [place] [address] Phone: [phone]  Relation: Child  Secondary Emergency Contact: [address] Phone: [phone]  Relation: Child    Past Surgical History:  Past Surgical History:   Procedure Laterality Date    APPENDECTOMY      BACK SURGERY      lumbar    CHOLECYSTECTOMY      HYSTERECTOMY      PERICARDIUM SURGERY N/A 2021    PERICARDIOCENTESIS performed by Joanie Salazar MD at [address] [facility]  2012    right       Immunization History: There is no immunization history on file for this patient.     Active Problems:  Patient Active Problem List   Diagnosis Code    Anxiety F41.9    Migraine headache G43.909    Accidental drug overdose T50.901A    Essential hypertension, benign I10    GERD (gastroesophageal reflux disease) K21.9    Dementia ([facility] [address] F03.90    Polypharmacy Z79.899    Malnutrition ([facility] [address] E46    Acute delirium R41.0    Pericardial effusion I31.3    Pain syndrome, chronic G89.4       Isolation/Infection: Isolation            No Isolation          Patient Infection Status       Infection Onset Added Last Indicated Last Indicated By Review Planned Expiration Resolved Resolved By    None active    Resolved    COVID-19 Rule Out 01/24/21 01/24/21 01/24/21 COVID-19 (Ordered)   01/24/21 Rule-Out Test Resulted            Nurse Assessment:  Last Vital Signs: BP (!) 92/54   Pulse 77   Temp 98.8 °F (37.1 °C) (Temporal)   Resp 18   Ht 5' 9\" (1.753 m)   Wt 147 lb 11.2 oz (67 kg)   SpO2 94%   BMI 21.81 kg/m²     Last documented pain score (0-10 scale): Pain Level: 5  Last Weight:   Wt Readings from Last 1 Encounters:   01/25/21 147 lb 11.2 oz (67 kg)     Mental Status:  oriented, alert and confused @ times    IV Access:  - None    Nursing Mobility/ADLs:  Walking   Assisted  Transfer  Assisted  Bathing  Assisted  Dressing  Assisted  Toileting  Assisted  Feeding  Independent  Med Admin  Assisted  Med Delivery   whole    Wound Care Documentation and Therapy:        Elimination:  Continence:   · Bowel: Yes  · Bladder: Yes  Urinary Catheter: None   Colostomy/Ileostomy/Ileal Conduit: No       Date of Last BM: 1/28    Intake/Output Summary (Last 24 hours) at 1/26/2021 1530  Last data filed at 1/26/2021 1455  Gross per 24 hour   Intake 1364 ml   Output 1295 ml   Net 69 ml     I/O last 3 completed shifts: In: 2466 [P.O.:900; I.V.:464]  Out: 1295 [Urine:425; Chest Tube:870]    Safety Concerns: At Risk for Falls    Impairments/Disabilities:      None    Nutrition Therapy:  Current Nutrition Therapy:   - Oral Diet:  Cardiac    Routes of Feeding: Oral  Liquids: Thin Liquids  Daily Fluid Restriction: no  Last Modified Barium Swallow with Video (Video Swallowing Test): not done    Treatments at the Time of Hospital Discharge:   Respiratory Treatments: ***  Oxygen Therapy:  is not on home oxygen therapy.   Ventilator:    - No ventilator support    Rehab Therapies: PT and OT to eval and treat   Weight Bearing Status/Restrictions: No

## 2021-01-26 NOTE — PROGRESS NOTES
Chief Complaint:  Pericardial effusion    Subjective: The patient is alert. No problems overnight. Denies chest pain & SOB . Denies abdominal pain. Tolerating diet. No nausea or vomiting. Sitter at the bedside    Objective:    Vitals:    01/26/21 0506   BP: 122/74   Pulse: 88   Resp: 16   Temp: 99 °F (37.2 °C)   SpO2: 93%     A&O x's 3, pericardial drain, NVD  Heart:  RRR, no murmurs, gallops, or rubs. Lungs:  Diminished but CTA bilaterally, no wheeze, rales or rhonchi  Abd: bowel sounds present, nontender, nondistended, no masses  Extrem:  No clubbing, cyanosis, or edema  Tele: NSR    Lab Results   Component Value Date     01/26/2021    K 3.5 01/26/2021    K 3.9 01/24/2021     01/26/2021    CO2 25 01/26/2021    BUN 14 01/26/2021    CREATININE 0.8 01/26/2021    CALCIUM 8.3 01/26/2021      Lab Results   Component Value Date    WBC 10.7 01/26/2021    RBC 4.57 01/26/2021    HGB 11.8 01/26/2021    HCT 38.3 01/26/2021    MCV 83.8 01/26/2021    MCH 25.8 01/26/2021    MCHC 30.8 01/26/2021    RDW 16.6 01/26/2021     01/26/2021    MPV 12.1 01/26/2021     PT/INR:    Lab Results   Component Value Date    PROTIME 16.4 01/24/2021    PROTIME 11.5 06/24/2011    INR 1.5 01/24/2021     No results for input(s): POCGLU in the last 72 hours. Recent Labs     01/24/21 2022 01/25/21  1418 01/26/21  0551    141 141   K 3.9 3.8 3.5    105 107   CO2 26 23 25   BUN 15 12 14   CREATININE 1.1* 0.8 0.8   CALCIUM 9.6 9.1 8.3*   GFRAA 57 >60 >60   LABGLOM 47 >60 >60   GLUCOSE 105* 105* 107*       Xr Chest (2 Vw)    Result Date: 1/24/2021  EXAMINATION: TWO XRAY VIEWS OF THE CHEST 1/24/2021 11:09 pm COMPARISON: 01/24/2021 HISTORY: ORDERING SYSTEM PROVIDED HISTORY: vertigo, confusion TECHNOLOGIST PROVIDED HISTORY: Reason for exam:->vertigo, confusion What reading provider will be dictating this exam?->CRC FINDINGS: The heart is mildly enlarged and unchanged.   The pulmonary vessels are engorged centrally and more prominent. There is hazy opacity along the left mid lung extending inferiorly with mild blunting of the left costophrenic sulcus which is unchanged. There is hazy right infrahilar opacity extending inferiorly which is more prominent. No pneumothorax is seen. The bones are intact. Stable cardiomegaly with increasing central pulmonary congestion. Hazy left basilar atelectasis and/or consolidation and probable mild to moderate left pleural effusion which is unchanged Increasing right infrahilar opacity extending inferiorly. Ct Head Wo Contrast    Result Date: 1/24/2021  EXAMINATION: CT OF THE HEAD WITHOUT CONTRAST  1/24/2021 9:52 pm TECHNIQUE: CT of the head was performed without the administration of intravenous contrast. Dose modulation, iterative reconstruction, and/or weight based adjustment of the mA/kV was utilized to reduce the radiation dose to as low as reasonably achievable. COMPARISON: 12/25/2014 HISTORY: ORDERING SYSTEM PROVIDED HISTORY: vertigo TECHNOLOGIST PROVIDED HISTORY: Reason for exam:->vertigo Has a \"code stroke\" or \"stroke alert\" been called? ->No Decision Support Exception->Emergency Medical Condition (MA) What reading provider will be dictating this exam?->CRC FINDINGS: BRAIN/VENTRICLES: The ventricles are mildly enlarged with diffuse mild prominence of the cortical sulci. There is moderate periventricular low density bilaterally which is unchanged. No intracranial hemorrhage or edema is seen. There is no extra-axial fluid collection or mass. There are small old lacunar infarcts in the scattered in the deep white matter bilaterally which are unchanged. ORBITS: The visualized portion of the orbits demonstrate no acute abnormality. SINUSES: The visualized paranasal sinuses and mastoid air cells demonstrate no acute abnormality. SOFT TISSUES/SKULL:  No acute abnormality of the visualized skull or soft tissues.     Mild atrophy and moderate chronic microischemic disease throughout and small right pericardial pleural effusion . Focal consolidative changes of the left lower lobe and the medial aspect of the lingula may be related to atelectasis due to adjacent effusion or may represent pneumonia. Critical results were called by Dr. Jose Davis MD to Gayathri Castro on 1/25/2021 at 00:27. Xr Chest Portable    Result Date: 1/25/2021  EXAMINATION: ONE XRAY VIEW OF THE CHEST 1/25/2021 3:17 pm COMPARISON: 01/24/2021 HISTORY: ORDERING SYSTEM PROVIDED HISTORY: evaluate lung fields, tube placement TECHNOLOGIST PROVIDED HISTORY: Reason for exam:->evaluate lung fields, tube placement What reading provider will be dictating this exam?->CRC FINDINGS: Status post placement of left-sided chest tube. Decreased opacities in left upper lung field. Opacities persist in left lung base silhouetting left heart border and left hemidiaphragm. There is improved aeration in right hilar location and right lung base. No evidence of pneumothorax. 1.  Status post placement of left chest tube. 2.  Improved aeration in left upper lung field and left hilar location. Effusion or atelectasis persists in left lung base. 3.  Improved aeration in right hilar location and right lung base. Xr Chest Portable    Result Date: 1/24/2021  EXAMINATION: ONE XRAY VIEW OF THE CHEST 1/24/2021 9:05 pm COMPARISON: 05/25/2014. HISTORY: ORDERING SYSTEM PROVIDED HISTORY: vertigo TECHNOLOGIST PROVIDED HISTORY: Reason for exam:->vertigo What reading provider will be dictating this exam?->CRC FINDINGS: The heart is mildly enlarged and more prominent. The pulmonary vessels are engorged centrally and more prominent. There is a questionable moderate left pleural effusion extending to the mid chest with hazy left basilar opacity. There is some hazy right basilar opacity medially    Mild cardiomegaly and mild central pulmonary congestion which is more prominent.  Questionable moderate left pleural effusion and associated left basilar atelectasis and consolidation. Suggest a follow-up PA and lateral chest Hazy right basilar opacity medially which could represent atelectasis or infiltrate. Scheduled Meds:   sodium chloride flush  10 mL Intravenous 2 times per day    ceFAZolin  2,000 mg Intravenous See Admin Instructions    enoxaparin  40 mg Subcutaneous Daily    amLODIPine  5 mg Oral Daily    aspirin  325 mg Oral Daily    lisinopril  10 mg Oral Daily    venlafaxine  75 mg Oral Daily    sodium chloride flush  10 mL Intravenous 2 times per day    acetaminophen  650 mg Oral Q6H    sennosides-docusate sodium  1 tablet Oral BID     Continuous Infusions:   lactated ringers 75 mL/hr at 01/26/21 0115    sodium chloride       PRN Meds:.sodium chloride flush, acetaminophen, sodium chloride, sodium chloride flush, oxyCODONE, magnesium hydroxide, bisacodyl, ondansetron    I/O last 3 completed shifts: In: 9910 [P.O.:420; I.V.:931]  Out: 3010 [Urine:950; Other:1405; Blood:5; Chest Tube:650]  No intake/output data recorded.     Intake/Output Summary (Last 24 hours) at 1/26/2021 0800  Last data filed at 1/26/2021 1294  Gross per 24 hour   Intake 1351 ml   Output 3010 ml   Net -1659 ml       Assessment:    Active Hospital Problems    Diagnosis    Pericardial effusion [I31.3]    Pain syndrome, chronic [G89.4]    Essential hypertension, benign [I10]    Dementia ([facility] [address] [F03.90]       Plan:  Surgical findings noted--1405 cc removed             Cardiac consult             Cultures pending             Sed rate nl        Travis Moreno DO  8:00 AM  1/26/2021

## 2021-01-27 LAB
ANION GAP SERPL CALCULATED.3IONS-SCNC: 11 MMOL/L (ref 7–16)
BODY FLUID CULTURE, STERILE: NORMAL
BUN BLDV-MCNC: 20 MG/DL (ref 8–23)
CALCIUM SERPL-MCNC: 8.5 MG/DL (ref 8.6–10.2)
CHLORIDE BLD-SCNC: 108 MMOL/L (ref 98–107)
CO2: 23 MMOL/L (ref 22–29)
CREAT SERPL-MCNC: 0.8 MG/DL (ref 0.5–1)
GFR AFRICAN AMERICAN: >60
GFR NON-AFRICAN AMERICAN: >60 ML/MIN/1.73
GLUCOSE BLD-MCNC: 97 MG/DL (ref 74–99)
GRAM STAIN RESULT: NORMAL
HCT VFR BLD CALC: 37.7 % (ref 34–48)
HEMOGLOBIN: 11.6 G/DL (ref 11.5–15.5)
LV EF: 68 %
LVEF MODALITY: NORMAL
MCH RBC QN AUTO: 25.8 PG (ref 26–35)
MCHC RBC AUTO-ENTMCNC: 30.8 % (ref 32–34.5)
MCV RBC AUTO: 84 FL (ref 80–99.9)
PDW BLD-RTO: 16.7 FL (ref 11.5–15)
PLATELET # BLD: 128 E9/L (ref 130–450)
PMV BLD AUTO: 14.1 FL (ref 7–12)
POTASSIUM SERPL-SCNC: 3.6 MMOL/L (ref 3.5–5)
RBC # BLD: 4.49 E12/L (ref 3.5–5.5)
SODIUM BLD-SCNC: 142 MMOL/L (ref 132–146)
URINE CULTURE, ROUTINE: NORMAL
WBC # BLD: 8.4 E9/L (ref 4.5–11.5)

## 2021-01-27 PROCEDURE — 2140000000 HC CCU INTERMEDIATE R&B

## 2021-01-27 PROCEDURE — 97535 SELF CARE MNGMENT TRAINING: CPT

## 2021-01-27 PROCEDURE — 6360000002 HC RX W HCPCS: Performed by: THORACIC SURGERY (CARDIOTHORACIC VASCULAR SURGERY)

## 2021-01-27 PROCEDURE — 80048 BASIC METABOLIC PNL TOTAL CA: CPT

## 2021-01-27 PROCEDURE — 36415 COLL VENOUS BLD VENIPUNCTURE: CPT

## 2021-01-27 PROCEDURE — 2580000003 HC RX 258: Performed by: THORACIC SURGERY (CARDIOTHORACIC VASCULAR SURGERY)

## 2021-01-27 PROCEDURE — 2580000003 HC RX 258: Performed by: INTERNAL MEDICINE

## 2021-01-27 PROCEDURE — 97166 OT EVAL MOD COMPLEX 45 MIN: CPT

## 2021-01-27 PROCEDURE — 6360000002 HC RX W HCPCS: Performed by: INTERNAL MEDICINE

## 2021-01-27 PROCEDURE — 93306 TTE W/DOPPLER COMPLETE: CPT

## 2021-01-27 PROCEDURE — 85027 COMPLETE CBC AUTOMATED: CPT

## 2021-01-27 PROCEDURE — 6370000000 HC RX 637 (ALT 250 FOR IP): Performed by: THORACIC SURGERY (CARDIOTHORACIC VASCULAR SURGERY)

## 2021-01-27 PROCEDURE — 97530 THERAPEUTIC ACTIVITIES: CPT

## 2021-01-27 RX ORDER — SODIUM CHLORIDE, SODIUM LACTATE, POTASSIUM CHLORIDE, AND CALCIUM CHLORIDE .6; .31; .03; .02 G/100ML; G/100ML; G/100ML; G/100ML
250 INJECTION, SOLUTION INTRAVENOUS ONCE
Status: COMPLETED | OUTPATIENT
Start: 2021-01-27 | End: 2021-01-27

## 2021-01-27 RX ORDER — SODIUM CHLORIDE, SODIUM LACTATE, POTASSIUM CHLORIDE, CALCIUM CHLORIDE 600; 310; 30; 20 MG/100ML; MG/100ML; MG/100ML; MG/100ML
INJECTION, SOLUTION INTRAVENOUS CONTINUOUS
Status: DISCONTINUED | OUTPATIENT
Start: 2021-01-27 | End: 2021-01-29 | Stop reason: HOSPADM

## 2021-01-27 RX ORDER — DIGOXIN 0.25 MG/ML
125 INJECTION INTRAMUSCULAR; INTRAVENOUS ONCE
Status: COMPLETED | OUTPATIENT
Start: 2021-01-27 | End: 2021-01-27

## 2021-01-27 RX ADMIN — SODIUM CHLORIDE, POTASSIUM CHLORIDE, SODIUM LACTATE AND CALCIUM CHLORIDE: 600; 310; 30; 20 INJECTION, SOLUTION INTRAVENOUS at 08:25

## 2021-01-27 RX ADMIN — ACETAMINOPHEN 650 MG: 325 TABLET ORAL at 10:35

## 2021-01-27 RX ADMIN — ASPIRIN 325 MG: 325 TABLET, COATED ORAL at 08:25

## 2021-01-27 RX ADMIN — VENLAFAXINE HYDROCHLORIDE 75 MG: 75 TABLET ORAL at 08:25

## 2021-01-27 RX ADMIN — AMLODIPINE BESYLATE 5 MG: 5 TABLET ORAL at 08:25

## 2021-01-27 RX ADMIN — DIGOXIN 125 MCG: 0.25 INJECTION INTRAMUSCULAR; INTRAVENOUS at 08:35

## 2021-01-27 RX ADMIN — ENOXAPARIN SODIUM 40 MG: 40 INJECTION SUBCUTANEOUS at 08:25

## 2021-01-27 RX ADMIN — LISINOPRIL 10 MG: 10 TABLET ORAL at 08:25

## 2021-01-27 RX ADMIN — OXYCODONE 5 MG: 5 TABLET ORAL at 01:29

## 2021-01-27 RX ADMIN — OXYCODONE 5 MG: 5 TABLET ORAL at 13:10

## 2021-01-27 RX ADMIN — ACETAMINOPHEN 650 MG: 325 TABLET ORAL at 04:56

## 2021-01-27 RX ADMIN — SODIUM CHLORIDE, PRESERVATIVE FREE 10 ML: 5 INJECTION INTRAVENOUS at 21:31

## 2021-01-27 RX ADMIN — DOCUSATE SODIUM 50 MG AND SENNOSIDES 8.6 MG 1 TABLET: 8.6; 5 TABLET, FILM COATED ORAL at 21:31

## 2021-01-27 RX ADMIN — DOCUSATE SODIUM 50 MG AND SENNOSIDES 8.6 MG 1 TABLET: 8.6; 5 TABLET, FILM COATED ORAL at 08:25

## 2021-01-27 RX ADMIN — SODIUM CHLORIDE, POTASSIUM CHLORIDE, SODIUM LACTATE AND CALCIUM CHLORIDE 250 ML: 600; 310; 30; 20 INJECTION, SOLUTION INTRAVENOUS at 19:00

## 2021-01-27 ASSESSMENT — PAIN SCALES - GENERAL
PAINLEVEL_OUTOF10: 3
PAINLEVEL_OUTOF10: 7
PAINLEVEL_OUTOF10: 0
PAINLEVEL_OUTOF10: 0
PAINLEVEL_OUTOF10: 7

## 2021-01-27 NOTE — PROGRESS NOTES
Occupational Therapy  OCCUPATIONAL THERAPY INITIAL EVALUATION      Date:2021  Patient Name: Prasanth Bryant  MRN: [id number]  : 1935  Room: [address] [place] [address]    Referring Provider:  Edi Rojo,     Evaluating OT: Nhi Watson OTR/L #     AM-PAC Daily Activity Raw Score: 15    Recommended Adaptive Equipment:  TBD     Diagnosis: Pericardial Effusion    Patient presented to the hospital with dizziness    Surgery: 21 Ultrasound guided pericardiocentesis with drain    Pertinent Medical History:    Past Medical History:   Diagnosis Date    Accidental drug overdose 2014    Acute delirium 2014    Anxiety     Arthritis     Dizziness, nonspecific     Generalized headaches     Heart murmur     stable per pt    Hypertension     Knee pain     right    Malnutrition ([address] 2014    Migraine headache     Polypharmacy 2014    Reflux gastritis     Rhabdomyolysis 2014    Sinus problem       Precautions:  Falls, chest tube to suction, , bed alarm     Home Living: Pt lives alone in a 2 story home with 3 SAMIR and B hand rails. Bed/bath on 2nd floor   Bathroom setup: sponge bathes; Pt reports tub/shower combo not currently working   Equipment owned: none    Prior Level of Function: Independent with ADLs , Independent with IADLs; ambulated without AD  Driving: yes  Occupation: retired nurse    Pain Level: Pt reports L chest tube site discomfort; did not quantify;  Therapist facilitated repositioning to address pain       Cognition: A&O: 4/4; Follows 1 step directions   Memory:  good   Sequencing:  fair   Problem solving:  fair   Judgement/safety:  Fair-   + anxiety, requiring emotional support throughout session      Functional Assessment:   Initial Eval Status  Date: 21 Treatment Status  Date: Short Term Goals = Long Term Goals  Treatment frequency: 1-4x/wk; PRN   Feeding Set-up   indep    Grooming Minimal Assist     standing  Supervision    UB Dressing Minimal Assist   Supervision    LB Dressing Maximal Assist   Minimal Assist    Bathing Maximal Assist  Minimal Assist    Toileting Maximal Assist   Minimal Assist    Bed Mobility  Supine to sit: Moderate Assist   Sit to supine: NT   Supine to sit: Stand by Assist   Sit to supine: Stand by Assist    Functional Transfers Minimal Assist   Supervision    Functional Mobility Minimal Assist     Short ambulation tasks in room with w/w  Supervision    Balance Sitting:     Static:  SBA    Dynamic:min A  Standing: min A     Activity Tolerance F-  F+   Visual/  Perceptual Glasses: yes  wfl                    Hand dominance: right     Strength ROM Additional Info:    RUE   wfl wfl good  and fair FMC/dexterity noted during ADL tasks     LUE wfl   wfl good  and fair FMC/dexterity noted during ADL tasks   Deferred formal MMT due to chest tube pain  Hearing: wfl  Sensation:wfl  Tone: wfl  Edema:none noted                             Comments: Upon arrival, patient supine in bed and agreeable to OT session. Pt demonstrating F- understanding of education/techniques, requiring additional training / education. At end of session, patient seated in chair with call light and phone within reach, all lines and tubes intact ( present). Pt would benefit from continued skilled OT to increase safety,  independence and quality of life.     Treatment:  OT services provided: Facilitation of bed mobility, unsupported sitting balance (impacting ADLs; addressing posture, weight shifting, dynamic reaching), functional transfers (various surfaces: bed, chair), standing tolerance tasks (addressing posture, balance and activity tolerance while incorporating light functional reaching; impacting ADLs and functional activity) and short functional ambulation tasks with w/w (cuing on sequencing, posture, w/w management and safety) - skilled cuing on sequencing, hand placement, posture, body mechanics, energy conservation techniques and safety. Therapist facilitated self-care retraining: UB/LB self-care tasks (robe, socks), grooming tasks and self-feeding task while educating pt on modified techniques, posture, safety and energy conservation techniques. Skilled monitoring of HR, O2 sats and pts response to treatment (O2 sats greater than 96% throughout session).         Assessment of current deficits    Functional mobility [x]  ADLs [x] Strength [x]  Cognition [x]  Functional transfers  [x] IADLs [x] Safety Awareness [x]  Endurance [x]  Fine Motor Coordination [] Balance [x] Vision/perception [] Sensation []   Gross Motor Coordination [] ROM [] Delirium []                  Motor Control []      Plan of Care:  1-4 days/wk for 1-2 weeks PRN   Instruction/training on adapted ADL techniques and AE recommendations to increase functional independence within precautions  Training on energy conservation strategies/techniques to improve independence/tolerance for self-care routine  Functional transfer/mobility training/DME recommendations for increased independence, safety, and fall prevention  Patient/Family education to increase follow through with safety techniques and functional independence  Recommendation of environmental modifications for increased safety with functional transfers/mobility and ADLs  Cognitive retraining/development of therapeutic activities to improve problem solving, judgement, memory, and attention for increased safety/participation in ADL/IADL tasks  Therapeutic exercise to improve motor endurance, ROM, and functional strength for ADLs/functional transfers  Therapeutic activities to facilitate/challenge dynamic balance, stand tolerance, fine motor dexterity/in-hand manipulation for increased independence with ADLs  Neuro-muscular re-education: facilitation of righting/equilibrium reactions, midline orientation, scapular stability/mobility, normalization of muscle tone, and facilitation of volitional active controled

## 2021-01-27 NOTE — PROGRESS NOTES
POD#2 Awake, alert. In some pain. Denies CP, palpitations, SOB at rest, dizziness/lightheadedness. Vitals:    01/27/21 0000 01/27/21 0400 01/27/21 0745 01/27/21 0835   BP: (!) 104/53 113/70 (!) 110/56    Pulse: 98 83 97 126   Resp: 20 20 18    Temp: 98 °F (36.7 °C) 98.2 °F (36.8 °C) 97.6 °F (36.4 °C)    TempSrc: Temporal Temporal Oral    SpO2: 95% 93% 93%    Weight:       Height:         O2: RA      Intake/Output Summary (Last 24 hours) at 1/27/2021 0949  Last data filed at 1/27/2021 0745  Gross per 24 hour   Intake 480 ml   Output 945 ml   Net -465 ml       CT output: 20mL/8hr (520mL/24hrs)     Recent Labs     01/25/21  1418 01/26/21  0551 01/27/21  0522   WBC 5.7 10.7 8.4   HGB 11.1* 11.8 11.6   HCT 35.8 38.3 37.7   * 137 128*      Recent Labs     01/25/21  1418 01/26/21  0551 01/27/21  0522   BUN 12 14 20   CREATININE 0.8 0.8 0.8       Telemetry: Afib      PE  Cardiac: irreg  Lungs: decreased bases  Chest Chest tubes x 1 present and secure. Very mild swelling post to drainage tube.  Some drainage around tube insertion site  Abd: Soft, nontender, +BS  Ext: GALVAN          A/P: POD# 2 s/p Ultrasound guided pericardiocentesis with drain    Afib over night and still in Afib currently with HR controlled in the 90s - cardiology notified and ordered digoxin and echo  VSS  CT with continued significant drainage - keep today  Increase activity as tolerated  Hgb stable 11.6        This patient's case and care plan was discussed with the attending surgeon

## 2021-01-27 NOTE — PROGRESS NOTES
Call placed to Dr. Clara Rabago answering service regarding the patient's conversion into afib RVR sustaining a rate in 100's to 110's.

## 2021-01-27 NOTE — PROGRESS NOTES
prominent. There is hazy opacity along the left mid lung extending inferiorly with mild blunting of the left costophrenic sulcus which is unchanged. There is hazy right infrahilar opacity extending inferiorly which is more prominent. No pneumothorax is seen. The bones are intact. Stable cardiomegaly with increasing central pulmonary congestion. Hazy left basilar atelectasis and/or consolidation and probable mild to moderate left pleural effusion which is unchanged Increasing right infrahilar opacity extending inferiorly. Ct Head Wo Contrast    Result Date: 1/24/2021  EXAMINATION: CT OF THE HEAD WITHOUT CONTRAST  1/24/2021 9:52 pm TECHNIQUE: CT of the head was performed without the administration of intravenous contrast. Dose modulation, iterative reconstruction, and/or weight based adjustment of the mA/kV was utilized to reduce the radiation dose to as low as reasonably achievable. COMPARISON: 12/25/2014 HISTORY: ORDERING SYSTEM PROVIDED HISTORY: vertigo TECHNOLOGIST PROVIDED HISTORY: Reason for exam:->vertigo Has a \"code stroke\" or \"stroke alert\" been called? ->No Decision Support Exception->Emergency Medical Condition (MA) What reading provider will be dictating this exam?->CRC FINDINGS: BRAIN/VENTRICLES: The ventricles are mildly enlarged with diffuse mild prominence of the cortical sulci. There is moderate periventricular low density bilaterally which is unchanged. No intracranial hemorrhage or edema is seen. There is no extra-axial fluid collection or mass. There are small old lacunar infarcts in the scattered in the deep white matter bilaterally which are unchanged. ORBITS: The visualized portion of the orbits demonstrate no acute abnormality. SINUSES: The visualized paranasal sinuses and mastoid air cells demonstrate no acute abnormality. SOFT TISSUES/SKULL:  No acute abnormality of the visualized skull or soft tissues.     Mild atrophy and moderate chronic microischemic disease throughout the deep white matter which is unchanged with no acute abnormality seen. Ct Chest Wo Contrast    Result Date: 1/25/2021  EXAMINATION: CT OF THE CHEST WITHOUT CONTRAST 1/25/2021 12:07 am TECHNIQUE: CT of the chest was performed without the administration of intravenous contrast. Multiplanar reformatted images are provided for review. Dose modulation, iterative reconstruction, and/or weight based adjustment of the mA/kV was utilized to reduce the radiation dose to as low as reasonably achievable. COMPARISON: None. HISTORY: ORDERING SYSTEM PROVIDED HISTORY: sob, pleural effusion, ? pneumonia TECHNOLOGIST PROVIDED HISTORY: Reason for exam:->sob, pleural effusion, ? pneumonia What reading provider will be dictating this exam?->CRC FINDINGS: Lines and tubes:  None. Lungs and Airways and Pleura:  Central airways are patent. Moderate left and small right pericardial pleural effusion. Focal consolidative changes of the left lower lobe and the medial aspect of the lingula may be related to atelectasis due to adjacent effusion or may represent pneumonia. Lymph nodes: No pathologically enlarged mediastinal, hilar, lower cervical, or chest wall lymph nodes. Cardiovascular and Mediastinum:  Massive pericardial effusion with significant constriction of the heart highly concerning for pericardial tamponade. Moderate amount of calcification of the coronary arteries and thoracic aorta. Bones/Soft tissues:  No definite suspicious lytic or blastic foci. Moderate degenerative changes of thoracic aorta. Upper abdomen: Multiple foci of calcification within the kidneys are likely related to combination of nonobstructing stone and vascular calcification. Simple cyst is noted within the midpole of the left kidney. A status post cholecystectomy visualized aspects of the upper abdomen are of unremarkable. Massive pericardial effusion with significant constriction of the heart highly concerning for pericardial tamponade.  Moderate left and small right pericardial pleural effusion . Focal consolidative changes of the left lower lobe and the medial aspect of the lingula may be related to atelectasis due to adjacent effusion or may represent pneumonia. Critical results were called by Dr. Merline Sly, MD to Army Nickels on 1/25/2021 at 00:27. Xr Chest Portable    Result Date: 1/25/2021  EXAMINATION: ONE XRAY VIEW OF THE CHEST 1/25/2021 3:17 pm COMPARISON: 01/24/2021 HISTORY: ORDERING SYSTEM PROVIDED HISTORY: evaluate lung fields, tube placement TECHNOLOGIST PROVIDED HISTORY: Reason for exam:->evaluate lung fields, tube placement What reading provider will be dictating this exam?->CRC FINDINGS: Status post placement of left-sided chest tube. Decreased opacities in left upper lung field. Opacities persist in left lung base silhouetting left heart border and left hemidiaphragm. There is improved aeration in right hilar location and right lung base. No evidence of pneumothorax. 1.  Status post placement of left chest tube. 2.  Improved aeration in left upper lung field and left hilar location. Effusion or atelectasis persists in left lung base. 3.  Improved aeration in right hilar location and right lung base. Xr Chest Portable    Result Date: 1/24/2021  EXAMINATION: ONE XRAY VIEW OF THE CHEST 1/24/2021 9:05 pm COMPARISON: 05/25/2014. HISTORY: ORDERING SYSTEM PROVIDED HISTORY: vertigo TECHNOLOGIST PROVIDED HISTORY: Reason for exam:->vertigo What reading provider will be dictating this exam?->CRC FINDINGS: The heart is mildly enlarged and more prominent. The pulmonary vessels are engorged centrally and more prominent. There is a questionable moderate left pleural effusion extending to the mid chest with hazy left basilar opacity. There is some hazy right basilar opacity medially    Mild cardiomegaly and mild central pulmonary congestion which is more prominent.  Questionable moderate left pleural effusion and associated left basilar atelectasis and consolidation. Suggest a follow-up PA and lateral chest Hazy right basilar opacity medially which could represent atelectasis or infiltrate. Scheduled Meds:   sodium chloride flush  10 mL Intravenous 2 times per day    ceFAZolin  2,000 mg Intravenous See Admin Instructions    enoxaparin  40 mg Subcutaneous Daily    amLODIPine  5 mg Oral Daily    aspirin  325 mg Oral Daily    lisinopril  10 mg Oral Daily    venlafaxine  75 mg Oral Daily    sodium chloride flush  10 mL Intravenous 2 times per day    acetaminophen  650 mg Oral Q6H    sennosides-docusate sodium  1 tablet Oral BID     Continuous Infusions:   lactated ringers 75 mL/hr at 01/27/21 0825    sodium chloride       PRN Meds:.perflutren lipid microspheres, sodium chloride flush, acetaminophen, sodium chloride, sodium chloride flush, oxyCODONE, magnesium hydroxide, bisacodyl, ondansetron    I/O last 3 completed shifts:   In: 480 [P.O.:480]  Out: 845 [Urine:325; Chest Tube:520]  I/O this shift:  In: -   Out: 100 [Urine:100]    Intake/Output Summary (Last 24 hours) at 1/27/2021 0856  Last data filed at 1/27/2021 0745  Gross per 24 hour   Intake 480 ml   Output 945 ml   Net -465 ml       Assessment:    Active Hospital Problems    Diagnosis    Pericardial effusion [I31.3]    Pain syndrome, chronic [G89.4]    Essential hypertension, benign [I10]    [facility] [F03.90]       Plan:  Surgical findings noted--1405 cc removed             Cardiac consult reviewed             Cultures pending--sterile thus far             Sed rate nl             Echo pending             Lab reviewed--nl             BP well controlled        Karel Moreno DO  8:56 AM  1/27/2021

## 2021-01-27 NOTE — PROGRESS NOTES
Patient complaining of urinary urgency. Voiding small amounts of urine at a time. Bladder scanned for 479ml. Dr Celina Coy paged. Orders obtained- Patient to be straight cathed, and if bladder scan is >300ml after 8 hours, vazquez to be reinserted.

## 2021-01-27 NOTE — PLAN OF CARE
Problem: Pain:  Goal: Pain level will decrease  Description: Pain level will decrease  Outcome: Met This Shift     Problem: Falls - Risk of:  Goal: Will remain free from falls  Description: Will remain free from falls  Outcome: Met This Shift     Problem: Skin Integrity:  Goal: Will show no infection signs and symptoms  Description: Will show no infection signs and symptoms  Outcome: Met This Shift     Problem: Cardiac:  Goal: Hemodynamic stability will improve  Description: Hemodynamic stability will improve  Outcome: Met This Shift

## 2021-01-27 NOTE — PROGRESS NOTES
Spoke with Dr Veronica Palm re: patient complaint of dizziness. Orders obtained for fluid bolus and continuous fluids.

## 2021-01-27 NOTE — CONSULTS
CARDIOLOGY CONSULTATION    Patient Name:  Antonio Brody    :  1935    Reason for Consultation:   Pericardial effusion; heart murmur    History of Present Illness:   Antonio Brody presents to [facility] [address] for she also notes that she is somewhat more short of breath. 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 She denies any previous cardiac history does have longstanding hypertension. She also notes that she is somewhat forgetful. Upon arrival to the emergency room a CAT scan was obtained of her chest which demonstrated moderate right and left pleural effusions as well as a \"massive\" pericardial effusion. Likewise, her proBNP was significantly elevated at 1535 pg/mL. She now presents for further valuation with both agnostic testing and adjustment of her medical regimen and/or surgical intervention. Past Medical History:   has a past medical history of Accidental drug overdose, Acute delirium, Anxiety, Arthritis, Dizziness, nonspecific, Generalized headaches, Heart murmur, Hypertension, Knee pain, Malnutrition ([address] Migraine headache, Polypharmacy, Reflux gastritis, Rhabdomyolysis, and Sinus problem. Surgical History:   has a past surgical history that includes Appendectomy; Cholecystectomy; Hysterectomy; back surgery (); Total knee arthroplasty (2012); and Pericardium surgery (N/A, 2021). Social History:   reports that she quit smoking about 13 years ago. She quit after 45.00 years of use. She has never used smokeless tobacco. She reports that she does not drink alcohol or use drugs. Family History:  family history is not on file. Medications:  Prior to Admission medications    Medication Sig Start Date End Date Taking? Authorizing Provider   morphine (MS CONTIN) 15 MG extended release tablet Take 15 mg by mouth daily.    Yes Historical Provider, MD   Cholecalciferol (VITAMIN D3) 1.25 MG (32099 UT) CAPS Take by mouth once a week    Historical Provider, MD   aspirin 325 MG EC tablet Take 1 tablet by mouth daily. 5/23/14   Liliana Galeano MD   amLODIPine (NORVASC) 10 MG tablet Take 1 tablet by mouth daily. Patient taking differently: Take 5 mg by mouth daily  5/24/14   Liliana Galeano MD   lisinopril (PRINIVIL;ZESTRIL) 10 MG tablet Take 1 tablet by mouth daily. 5/24/14   Liliana Galeano MD   venlafaxine (EFFEXOR) 37.5 MG tablet Take 75 mg by mouth daily     Historical Provider, MD       Allergies: Other     Review of Systems:   · Constitutional: there has been no unanticipated weight loss. There's been a significant change in energy level over the past several weeks. ,  No change in sleep pattern or activity level. No fever chills or rigors. · Eyes: No visual changes or diplopia. No scleral icterus. · ENT: No Headaches, hearing loss or vertigo. No mouth sores or sore throat. No change in taste or smell. · Cardiovascular: No chest discomfort, + dyspnea on exertion, palpitations, loss of consciousness, no phlebitis, no claudication. · Respiratory: No cough or wheezing, no sputum production. No hemoptysis, pleuritic pain. · Gastrointestinal: No abdominal pain, appetite loss, blood in stools. No change in bowel habits. No hematemesis  · Genitourinary: No dysuria, trouble voiding or hematuria. No nocturia or increased frequency. · Musculoskeletal:  No gait disturbance, weakness or joint complaints. · Integumentary: No rash or pruritis. · Neurological: No headache, diplopia, change in muscle strength, numbness or tingling. No change in gait, balance, coordination, mood, affect, memory, mentation, behavior. · Psychiatric: No anxiety or depression. · Endocrine: No temperature intolerance. No excessive thirst, fluid intake, or urination. No tremor. · Hematologic/Lymphatic: No abnormal bruising or bleeding, blood clots or swollen lymph nodes. · Allergic/Immunologic: No nasal congestion or hives.     Physical Examination:    Vital Signs: /63 Pulse 100   Temp 97.3 °F (36.3 °C) (Temporal)   Resp 18   Ht 5' 9\" (1.753 m)   Wt 147 lb 11.2 oz (67 kg)   SpO2 93%   BMI 21.81 kg/m²   General appearance: Well preserved, mesomorphic body habitus, alert, no distress. Skin: Skin color, texture, turgor normal. No rashes or lesions. No induration or tightening palpated. Head: Normocephalic. No masses, lesions, tenderness or abnormalities  Eyes: Conjunctivae/corneas clear. PERRL, EOMs intact. Sclera non icteric. Ears: External ears normal. Canals clear. TM's clear bilaterally. Hearing normal to finger rub. Nose/Sinuses: Nares normal. Septum midline. Mucosa normal. No drainage or sinus tenderness. Oropharynx: Lips, mucosa, and tongue normal. Oropharynx clear with no exudate seen. Neck: Neck supple and symmetric. No adenopathy. Thyroid symmetric, normal size, without nodules. Trachea is midline. Carotids brisk in upstroke without bruits, no abnormal JVP noted at 45°. Chest: Even excursion  Lungs: Lungs clear to auscultation bilaterally. No retractions or use of accessory muscles. No tactile vocal fremitus. No rhonchi, crackles or rales. Heart:  S1 > S2. Regular rhythm. No gallop questionable rub and grade 9-2/6 systolic ejection murmur second right intercostal space as well as left midsternal border. . No rub, palpable thrill or heave noted. PMI 5th intercostal space midclavicular line. Abdomen: Abdomen soft, mildly protuberant, non-tender. BS normal. No masses, organomegaly. No hernia noted. Extremities: Extremities normal. No deformities, edema, or skin discoloration. No cyanosis or clubbing noted to the nails. Peripheral pulses present 2+ upper extremities and present 1+  lower extremities. Musculoskeletal: Spine ROM normal. Muscular strength intact. Neuro: Cranial nerves intact. Motor: Strength 5/5 in all extremities. Reflexes 2+ in all extremities. No focal weakness. Sensory: grossly normal to touch. Coordination intact.     Pertinent Labs:  CBC: Recent Labs     01/24/21 2022 01/25/21  1418 01/26/21  0551   WBC 6.8 5.7 10.7   HGB 11.4* 11.1* 11.8    128* 137     BMP:  Recent Labs     01/24/21 2022 01/25/21  1418 01/26/21  0551    141 141   K 3.9 3.8 3.5    105 107   CO2 26 23 25   BUN 15 12 14   CREATININE 1.1* 0.8 0.8   GLUCOSE 105* 105* 107*   LABGLOM 47 >60 >60     ABGs: No results found for: PH, PO2, PCO2  INR:   Recent Labs     01/24/21 2022   INR 1.5     PRO-BNP:   Lab Results   Component Value Date    PROBNP 1,535 (H) 01/24/2021      Cardiac Injury Profile:   Recent Labs     01/24/21 2022   TROPONINI <0.01      Lipid Profile:   Lab Results   Component Value Date    TRIG 63 02/27/2019    HDL 63 02/27/2019    LDLCALC 140 02/27/2019    CHOL 216 02/27/2019      Thyroid:   Lab Results   Component Value Date    TSH 1.770 01/25/2021      Hemoglobin A1C: No components found for: HGBA1C   ECG:  See report    Radiology:  Xr Chest (2 Vw)    Result Date: 1/24/2021  EXAMINATION: TWO XRAY VIEWS OF THE CHEST 1/24/2021 11:09 pm COMPARISON: 01/24/2021 HISTORY: ORDERING SYSTEM PROVIDED HISTORY: vertigo, confusion TECHNOLOGIST PROVIDED HISTORY: Reason for exam:->vertigo, confusion What reading provider will be dictating this exam?->CRC FINDINGS: The heart is mildly enlarged and unchanged. The pulmonary vessels are engorged centrally and more prominent. There is hazy opacity along the left mid lung extending inferiorly with mild blunting of the left costophrenic sulcus which is unchanged. There is hazy right infrahilar opacity extending inferiorly which is more prominent. No pneumothorax is seen. The bones are intact. Stable cardiomegaly with increasing central pulmonary congestion. Hazy left basilar atelectasis and/or consolidation and probable mild to moderate left pleural effusion which is unchanged Increasing right infrahilar opacity extending inferiorly.     Ct Head Wo Contrast    Result Date: 1/24/2021  EXAMINATION: CT OF THE HEAD WITHOUT CONTRAST  1/24/2021 9:52 pm TECHNIQUE: CT of the head was performed without the administration of intravenous contrast. Dose modulation, iterative reconstruction, and/or weight based adjustment of the mA/kV was utilized to reduce the radiation dose to as low as reasonably achievable. COMPARISON: 12/25/2014 HISTORY: ORDERING SYSTEM PROVIDED HISTORY: vertigo TECHNOLOGIST PROVIDED HISTORY: Reason for exam:->vertigo Has a \"code stroke\" or \"stroke alert\" been called? ->No Decision Support Exception->Emergency Medical Condition (MA) What reading provider will be dictating this exam?->CRC FINDINGS: BRAIN/VENTRICLES: The ventricles are mildly enlarged with diffuse mild prominence of the cortical sulci. There is moderate periventricular low density bilaterally which is unchanged. No intracranial hemorrhage or edema is seen. There is no extra-axial fluid collection or mass. There are small old lacunar infarcts in the scattered in the deep white matter bilaterally which are unchanged. ORBITS: The visualized portion of the orbits demonstrate no acute abnormality. SINUSES: The visualized paranasal sinuses and mastoid air cells demonstrate no acute abnormality. SOFT TISSUES/SKULL:  No acute abnormality of the visualized skull or soft tissues. Mild atrophy and moderate chronic microischemic disease throughout the deep white matter which is unchanged with no acute abnormality seen. Ct Chest Wo Contrast    Result Date: 1/25/2021  EXAMINATION: CT OF THE CHEST WITHOUT CONTRAST 1/25/2021 12:07 am TECHNIQUE: CT of the chest was performed without the administration of intravenous contrast. Multiplanar reformatted images are provided for review. Dose modulation, iterative reconstruction, and/or weight based adjustment of the mA/kV was utilized to reduce the radiation dose to as low as reasonably achievable. COMPARISON: None.  HISTORY: ORDERING SYSTEM PROVIDED HISTORY: sob, pleural effusion, ? pneumonia TECHNOLOGIST PROVIDED HISTORY: Reason for exam:->sob, pleural effusion, ? pneumonia What reading provider will be dictating this exam?->CRC FINDINGS: Lines and tubes:  None. Lungs and Airways and Pleura:  Central airways are patent. Moderate left and small right pericardial pleural effusion. Focal consolidative changes of the left lower lobe and the medial aspect of the lingula may be related to atelectasis due to adjacent effusion or may represent pneumonia. Lymph nodes: No pathologically enlarged mediastinal, hilar, lower cervical, or chest wall lymph nodes. Cardiovascular and Mediastinum:  Massive pericardial effusion with significant constriction of the heart highly concerning for pericardial tamponade. Moderate amount of calcification of the coronary arteries and thoracic aorta. Bones/Soft tissues:  No definite suspicious lytic or blastic foci. Moderate degenerative changes of thoracic aorta. Upper abdomen: Multiple foci of calcification within the kidneys are likely related to combination of nonobstructing stone and vascular calcification. Simple cyst is noted within the midpole of the left kidney. A status post cholecystectomy visualized aspects of the upper abdomen are of unremarkable. Massive pericardial effusion with significant constriction of the heart highly concerning for pericardial tamponade. Moderate left and small right pericardial pleural effusion . Focal consolidative changes of the left lower lobe and the medial aspect of the lingula may be related to atelectasis due to adjacent effusion or may represent pneumonia. Critical results were called by Dr. Shana Mccauley MD to Burt Hull on 1/25/2021 at 00:27.     Xr Chest Portable    Result Date: 1/25/2021  EXAMINATION: ONE XRAY VIEW OF THE CHEST 1/25/2021 3:17 pm COMPARISON: 01/24/2021 HISTORY: ORDERING SYSTEM PROVIDED HISTORY: evaluate lung fields, tube placement TECHNOLOGIST PROVIDED HISTORY: Reason for exam:->evaluate lung fields, tube findings will make further recommendations for medication change and/or intervention. I have spent more than 45 minutes face to face with Myke Pagan reviewing notes and laboratory data with greater than 50% of this time instructing and counseling the patient regarding my findings and recommendations and I have answered all questions as posed to me by Ms. Charles. Thank you, Jagruti Echols DO for allowing me to consult in the care of this patient. Dru Kong DO, FACP, FACC, FSCAI    NOTE:  This report was transcribed using voice recognition software. Every effort was made to ensure accuracy; however, inadvertent computerized transcription errors may be present.

## 2021-01-27 NOTE — PROGRESS NOTES
Spoke with Dr Julio C Russell re: A fib. Order obtained for IV Digoxin x1 and to have echocardiogram done this morning. Spoke with cardiology scheduling regarding this. CTS also notified that patient in A fib.

## 2021-01-27 NOTE — CARE COORDINATION
SOCIAL WORK/CASEMANAGEMENT TRANSITION OF CARE [id number], [address], [place], [phone]): son, tanja, called around 8:30 a.m. today and said he talked with dr. Juan Hdz and he wants pt to go to [facility]. Called rep, julien, who said they have a bed and will assess pt. Pt with a sitter , on iv ancef and has drain in. Echo pending. OT to eval. PT saw pt yesterday. Maricel Steward  1/27/2021   Pt accepted to [facility]. Exempt and discharge paper work done. Must be sitter free for 24 hours prior to discharge. Updated OT note in from today and PT note from yesterday. Pt will go under [facility]. Left rapid covid test in soft chart. .1/27/2021. Maricel Steward

## 2021-01-28 LAB
ANION GAP SERPL CALCULATED.3IONS-SCNC: 7 MMOL/L (ref 7–16)
BUN BLDV-MCNC: 15 MG/DL (ref 8–23)
CALCIUM SERPL-MCNC: 8.1 MG/DL (ref 8.6–10.2)
CHLORIDE BLD-SCNC: 106 MMOL/L (ref 98–107)
CO2: 26 MMOL/L (ref 22–29)
CREAT SERPL-MCNC: 0.7 MG/DL (ref 0.5–1)
GFR AFRICAN AMERICAN: >60
GFR NON-AFRICAN AMERICAN: >60 ML/MIN/1.73
GLUCOSE BLD-MCNC: 91 MG/DL (ref 74–99)
POTASSIUM SERPL-SCNC: 3.7 MMOL/L (ref 3.5–5)
SODIUM BLD-SCNC: 139 MMOL/L (ref 132–146)

## 2021-01-28 PROCEDURE — 2580000003 HC RX 258: Performed by: THORACIC SURGERY (CARDIOTHORACIC VASCULAR SURGERY)

## 2021-01-28 PROCEDURE — 6370000000 HC RX 637 (ALT 250 FOR IP): Performed by: INTERNAL MEDICINE

## 2021-01-28 PROCEDURE — 2580000003 HC RX 258: Performed by: INTERNAL MEDICINE

## 2021-01-28 PROCEDURE — 80048 BASIC METABOLIC PNL TOTAL CA: CPT

## 2021-01-28 PROCEDURE — 6360000002 HC RX W HCPCS: Performed by: THORACIC SURGERY (CARDIOTHORACIC VASCULAR SURGERY)

## 2021-01-28 PROCEDURE — 6370000000 HC RX 637 (ALT 250 FOR IP): Performed by: THORACIC SURGERY (CARDIOTHORACIC VASCULAR SURGERY)

## 2021-01-28 PROCEDURE — 97530 THERAPEUTIC ACTIVITIES: CPT

## 2021-01-28 PROCEDURE — 36415 COLL VENOUS BLD VENIPUNCTURE: CPT

## 2021-01-28 PROCEDURE — 2140000000 HC CCU INTERMEDIATE R&B

## 2021-01-28 PROCEDURE — 97535 SELF CARE MNGMENT TRAINING: CPT

## 2021-01-28 RX ORDER — MECLIZINE HCL 12.5 MG/1
12.5 TABLET ORAL 2 TIMES DAILY
Status: DISCONTINUED | OUTPATIENT
Start: 2021-01-28 | End: 2021-01-29 | Stop reason: HOSPADM

## 2021-01-28 RX ADMIN — OXYCODONE 5 MG: 5 TABLET ORAL at 03:36

## 2021-01-28 RX ADMIN — SODIUM CHLORIDE, POTASSIUM CHLORIDE, SODIUM LACTATE AND CALCIUM CHLORIDE: 600; 310; 30; 20 INJECTION, SOLUTION INTRAVENOUS at 07:37

## 2021-01-28 RX ADMIN — SODIUM CHLORIDE, PRESERVATIVE FREE 10 ML: 5 INJECTION INTRAVENOUS at 20:44

## 2021-01-28 RX ADMIN — DOCUSATE SODIUM 50 MG AND SENNOSIDES 8.6 MG 1 TABLET: 8.6; 5 TABLET, FILM COATED ORAL at 09:00

## 2021-01-28 RX ADMIN — ENOXAPARIN SODIUM 40 MG: 40 INJECTION SUBCUTANEOUS at 09:00

## 2021-01-28 RX ADMIN — SODIUM CHLORIDE, POTASSIUM CHLORIDE, SODIUM LACTATE AND CALCIUM CHLORIDE: 600; 310; 30; 20 INJECTION, SOLUTION INTRAVENOUS at 23:16

## 2021-01-28 RX ADMIN — MECLIZINE 12.5 MG: 12.5 TABLET ORAL at 20:44

## 2021-01-28 RX ADMIN — ACETAMINOPHEN 650 MG: 325 TABLET ORAL at 00:42

## 2021-01-28 RX ADMIN — DOCUSATE SODIUM 50 MG AND SENNOSIDES 8.6 MG 1 TABLET: 8.6; 5 TABLET, FILM COATED ORAL at 20:44

## 2021-01-28 RX ADMIN — MECLIZINE 12.5 MG: 12.5 TABLET ORAL at 16:12

## 2021-01-28 RX ADMIN — OXYCODONE 5 MG: 5 TABLET ORAL at 17:52

## 2021-01-28 RX ADMIN — OXYCODONE 5 MG: 5 TABLET ORAL at 13:47

## 2021-01-28 RX ADMIN — VENLAFAXINE HYDROCHLORIDE 75 MG: 75 TABLET ORAL at 09:00

## 2021-01-28 RX ADMIN — ASPIRIN 325 MG: 325 TABLET, COATED ORAL at 09:01

## 2021-01-28 ASSESSMENT — PAIN DESCRIPTION - LOCATION
LOCATION: CHEST
LOCATION: CHEST

## 2021-01-28 ASSESSMENT — PAIN DESCRIPTION - ORIENTATION: ORIENTATION: LEFT

## 2021-01-28 ASSESSMENT — PAIN SCALES - GENERAL
PAINLEVEL_OUTOF10: 7
PAINLEVEL_OUTOF10: 0
PAINLEVEL_OUTOF10: 10

## 2021-01-28 ASSESSMENT — PAIN DESCRIPTION - PAIN TYPE: TYPE: ACUTE PAIN

## 2021-01-28 ASSESSMENT — PAIN DESCRIPTION - DESCRIPTORS: DESCRIPTORS: ACHING;SORE;DISCOMFORT

## 2021-01-28 NOTE — PROGRESS NOTES
PROGRESS NOTE       PATIENT PROBLEM LIST:  Active Problems:    Essential hypertension, benign    Dementia (HCC)    Pericardial effusion    Pain syndrome, chronic  Resolved Problems:    * No resolved hospital problems. *      SUBJECTIVE:  Jayson Guerra states she feels somewhat better and has much less shortness of breath but is somewhat lightheaded. REVIEW OF SYSTEMS:  General ROS: positive for - fatigue, weight loss  Psychological ROS: negative for - anxiety , depression  Ophthalmic ROS: positive for - decreased vision and utilizes corrective lenses for visual acuity. ENT ROS: negative  Allergy and Immunology ROS: negative  Hematological and Lymphatic ROS: negative  Endocrine: no heat or cold intolerance and no polyphagia, polydipsia, or polyuria  Respiratory ROS: positive for - shortness of breath  Cardiovascular ROS: positive for - dyspnea on exertion, irregular heartbeat, murmur, shortness of breath and lightheadedness. Gastrointestinal ROS: no abdominal pain, change in bowel habits, or black or bloody stools  Genito-Urinary ROS: no nocturia, dysuria, trouble voiding, frequency or hematuria  Musculoskeletal ROS: negative for- myalgias, arthralgias, or claudication  Neurological ROS: no TIA or stroke symptoms otherwise no significant change in symptoms or problems since yesterday as documented in previous progress notes.     SCHEDULED MEDICATIONS:   sodium chloride flush  10 mL Intravenous 2 times per day    ceFAZolin  2,000 mg Intravenous See Admin Instructions    enoxaparin  40 mg Subcutaneous Daily    amLODIPine  5 mg Oral Daily    aspirin  325 mg Oral Daily    lisinopril  10 mg Oral Daily    venlafaxine  75 mg Oral Daily    sodium chloride flush  10 mL Intravenous 2 times per day    sennosides-docusate sodium  1 tablet Oral BID       VITAL SIGNS:                                                                                                                          /70   Pulse 79 Temp 97.4 °F (36.3 °C) (Oral)   Resp 16   Ht 5' 9\" (1.753 m)   Wt 147 lb 11.2 oz (67 kg)   SpO2 97%   BMI 21.81 kg/m²   Patient Vitals for the past 96 hrs (Last 3 readings):   Weight   01/25/21 0242 147 lb 11.2 oz (67 kg)   01/24/21 1941 160 lb (72.6 kg)     OBJECTIVE:    HEENT: PERRL, EOM  Intact; sclera non-icteric, conjunctiva pink. Carotids are brisk in upstroke with decreased and delayed upstroke. No carotid bruits. Normal jugular venous pulsation at 45°. No palpable cervical nor supraclavicular nodes. Thyroid not palpable. Trachea midline. Chest: Even excursion  Lungs: Decreased breath sounds bases bilaterally, no expiratory wheezes or rhonchi, no decreased tactile fremitus without inspiratory rales. Heart: Regular  rhythm; S1 > S2, no gallop grade 3/6 harsh systolic ejection murmur second right intercostal space radiating to the base of neck and heard at the apex. No clicks, rub, palpable thrills   or heaves. PMI nondisplaced, 5th intercostal space MCL. Abdomen: Soft, nontender, nondistended,  mildly protuberant, no masses or organomegaly. Bowel sounds active. Extremities: Without clubbing, cyanosis or edema. Pulses present 3+ upper extermities bilaterally; present 1+ DP and present 1+ PT bilaterally.      Data:   Scheduled Meds: Reviewed  Continuous Infusions:    lactated ringers 75 mL/hr at 01/27/21 2028    sodium chloride         Intake/Output Summary (Last 24 hours) at 1/28/2021 0132  Last data filed at 1/27/2021 2359  Gross per 24 hour   Intake 2307 ml   Output 1190 ml   Net 1117 ml     CBC:   Recent Labs     01/25/21  1418 01/26/21  0551 01/27/21  0522   WBC 5.7 10.7 8.4   HGB 11.1* 11.8 11.6   HCT 35.8 38.3 37.7   * 137 128*     BMP:  Recent Labs     01/25/21  1418 01/26/21  0551 01/27/21  0522    141 142   K 3.8 3.5 3.6    107 108*   CO2 23 25 23   BUN 12 14 20   CREATININE 0.8 0.8 0.8   LABGLOM >60 >60 >60     ABGs: No results found for: PH, PO2, PCO2  INR: No results for input(s): INR in the last 72 hours. PRO-BNP:   Lab Results   Component Value Date    PROBNP 1,535 (H) 01/24/2021      TSH:   Lab Results   Component Value Date    TSH 1.770 01/25/2021      Cardiac Injury Profile: No results for input(s): CKTOTAL, CKMB, TROPONINI in the last 72 hours. Lipid Profile:   Lab Results   Component Value Date    TRIG 63 02/27/2019    HDL 63 02/27/2019    LDLCALC 140 02/27/2019    CHOL 216 02/27/2019      Hemoglobin A1C: No components found for: HGBA1C     RAD:   Echo Complete    Result Date: 1/27/2021  Transthoracic Echocardiography Report (TTE)  Demographics   Patient Name      Germán Gutierrez Gender              Female                    A   Medical Record    [id number]       Room Number         8969  Number   Account #         [de-identified]      Procedure Date      01/27/2021   Corporate ID                     Ordering Physician  Wing Lisa CAST   Accession Number  [id number]     Referring Physician   Date of Birth     1935     Sonographer         Kelsey Hale RDCS   Age               82 year(s)     Interpreting        Wing Lisa CAST                                   Physician                                    Any Other  Procedure Type of Study   TTE procedure  Procedure Date Date: 01/27/2021 Start: 11:25 AM Study Location: Portable Technical Quality: Adequate visualization Indications:Pericardial effusion. Patient Status: ASAP Height: 69 inches Weight: 147 pounds BSA: 1.81 m^2 BMI: 21.71 kg/m^2 BP: 110/56 mmHg  Findings   Left Ventricle  Left ventricle grossly normal in size. Mild-moderate left ventricular concentric hypertrophy noted. Normal LV segmental wall motion. Estimated left ventricular ejection fraction is 68±5%. <50% criteria for diastolic dysfunction. Right Ventricle  Mildly dilated right ventricle. Right ventricle global systolic function is mildly reduced .   TAPSE is decreased   Left Atrium  The left atrium is mildly dilated. The LAESV Index is >34 ml/m2. Interatrial septum appears intact. Right Atrium  Mildly enlarged right atrium. Mitral Valve  Mild-moderate mitral annular calcification. Mild mitral regurgitation is present with 2 jets. Tricuspid Valve  The tricuspid valve appears structurally normal.  Physiologic and/or trace tricuspid regurgitation. RVSP is 33 mmHg. Aortic Valve  The aortic valve is trileaflet. Decreased aortic valve leaflet excursion. The aortic valve appears moderately sclerotic. The mean gradient across the aortic valve is calculated as 100 mmHg by  doppler. Severe aortic stenosis is present. Trace aortic regurgitation is noted. Pulmonic Valve  Pulmonic valve is structurally normal.  Physiologic and/or trace pulmonic regurgitation present. Pericardial Effusion  No evidence of pericardial thickening/calcification present. There is a small pericardial effusion. Aorta  Aortic root dimension within normal limits. Miscellaneous  Normal Inferior Vena Cava diameter and respiratory variation. Conclusions   Summary  Left ventricle grossly normal in size. Mild-moderate left ventricular concentric hypertrophy noted. Normal LV segmental wall motion. Estimated left ventricular ejection fraction is 68±5%. <50% criteria for diastolic dysfunction. The LAESV Index is >34 ml/m2. Mildly dilated right ventricle. Right ventricle global systolic function is mildly reduced . TAPSE is decreased  Mild mitral regurgitation is present with 3 jets. The mean gradient across the aortic valve is calculated as 100 mmHg by  doppler. Severe aortic stenosis is present. Trace aortic regurgitation is noted. Physiologic and/or trace pulmonic regurgitation present. Physiologic and/or trace tricuspid regurgitation. RVSP is 33 mmHg. There is a small pericardial effusion. Technically good quality study. Compared to prior echo, changes noted. Suggest clinical correlation. Signature   ----------------------------------------------------------------  Electronically signed by Leo Monzon DO(Interpreting  physician) on 01/27/2021 09:21 PM      Xr Chest (2 Vw)    Result Date: 1/24/2021  EXAMINATION: TWO XRAY VIEWS OF THE CHEST 1/24/2021 11:09 pm COMPARISON: 01/24/2021 HISTORY: ORDERING SYSTEM PROVIDED HISTORY: vertigo, confusion TECHNOLOGIST PROVIDED HISTORY: Reason for exam:->vertigo, confusion What reading provider will be dictating this exam?->CRC FINDINGS: The heart is mildly enlarged and unchanged. The pulmonary vessels are engorged centrally and more prominent. There is hazy opacity along the left mid lung extending inferiorly with mild blunting of the left costophrenic sulcus which is unchanged. There is hazy right infrahilar opacity extending inferiorly which is more prominent. No pneumothorax is seen. The bones are intact. Stable cardiomegaly with increasing central pulmonary congestion. Hazy left basilar atelectasis and/or consolidation and probable mild to moderate left pleural effusion which is unchanged Increasing right infrahilar opacity extending inferiorly. Ct Head Wo Contrast    Result Date: 1/24/2021  EXAMINATION: CT OF THE HEAD WITHOUT CONTRAST  1/24/2021 9:52 pm TECHNIQUE: CT of the head was performed without the administration of intravenous contrast. Dose modulation, iterative reconstruction, and/or weight based adjustment of the mA/kV was utilized to reduce the radiation dose to as low as reasonably achievable. COMPARISON: 12/25/2014 HISTORY: ORDERING SYSTEM PROVIDED HISTORY: vertigo TECHNOLOGIST PROVIDED HISTORY: Reason for exam:->vertigo Has a \"code stroke\" or \"stroke alert\" been called? ->No Decision Support Exception->Emergency Medical Condition (MA) What reading provider will be dictating this exam?->CRC FINDINGS: BRAIN/VENTRICLES: The ventricles are mildly enlarged with diffuse mild prominence of the cortical sulci.   There is moderate periventricular low density bilaterally which is unchanged. No intracranial hemorrhage or edema is seen. There is no extra-axial fluid collection or mass. There are small old lacunar infarcts in the scattered in the deep white matter bilaterally which are unchanged. ORBITS: The visualized portion of the orbits demonstrate no acute abnormality. SINUSES: The visualized paranasal sinuses and mastoid air cells demonstrate no acute abnormality. SOFT TISSUES/SKULL:  No acute abnormality of the visualized skull or soft tissues. Mild atrophy and moderate chronic microischemic disease throughout the deep white matter which is unchanged with no acute abnormality seen. Ct Chest Wo Contrast    Result Date: 1/25/2021  EXAMINATION: CT OF THE CHEST WITHOUT CONTRAST 1/25/2021 12:07 am TECHNIQUE: CT of the chest was performed without the administration of intravenous contrast. Multiplanar reformatted images are provided for review. Dose modulation, iterative reconstruction, and/or weight based adjustment of the mA/kV was utilized to reduce the radiation dose to as low as reasonably achievable. COMPARISON: None. HISTORY: ORDERING SYSTEM PROVIDED HISTORY: sob, pleural effusion, ? pneumonia TECHNOLOGIST PROVIDED HISTORY: Reason for exam:->sob, pleural effusion, ? pneumonia What reading provider will be dictating this exam?->CRC FINDINGS: Lines and tubes:  None. Lungs and Airways and Pleura:  Central airways are patent. Moderate left and small right pericardial pleural effusion. Focal consolidative changes of the left lower lobe and the medial aspect of the lingula may be related to atelectasis due to adjacent effusion or may represent pneumonia. Lymph nodes: No pathologically enlarged mediastinal, hilar, lower cervical, or chest wall lymph nodes. Cardiovascular and Mediastinum:  Massive pericardial effusion with significant constriction of the heart highly concerning for pericardial tamponade.   Moderate amount of calcification of the coronary arteries and thoracic aorta. Bones/Soft tissues:  No definite suspicious lytic or blastic foci. Moderate degenerative changes of thoracic aorta. Upper abdomen: Multiple foci of calcification within the kidneys are likely related to combination of nonobstructing stone and vascular calcification. Simple cyst is noted within the midpole of the left kidney. A status post cholecystectomy visualized aspects of the upper abdomen are of unremarkable. Massive pericardial effusion with significant constriction of the heart highly concerning for pericardial tamponade. Moderate left and small right pericardial pleural effusion . Focal consolidative changes of the left lower lobe and the medial aspect of the lingula may be related to atelectasis due to adjacent effusion or may represent pneumonia. Critical results were called by Dr. Constantine Durham MD to Darrius Hickman on 1/25/2021 at 00:27. Xr Chest Portable    Result Date: 1/25/2021  EXAMINATION: ONE XRAY VIEW OF THE CHEST 1/25/2021 3:17 pm COMPARISON: 01/24/2021 HISTORY: ORDERING SYSTEM PROVIDED HISTORY: evaluate lung fields, tube placement TECHNOLOGIST PROVIDED HISTORY: Reason for exam:->evaluate lung fields, tube placement What reading provider will be dictating this exam?->CRC FINDINGS: Status post placement of left-sided chest tube. Decreased opacities in left upper lung field. Opacities persist in left lung base silhouetting left heart border and left hemidiaphragm. There is improved aeration in right hilar location and right lung base. No evidence of pneumothorax. 1.  Status post placement of left chest tube. 2.  Improved aeration in left upper lung field and left hilar location. Effusion or atelectasis persists in left lung base. 3.  Improved aeration in right hilar location and right lung base.     Xr Chest Portable    Result Date: 1/24/2021  EXAMINATION: ONE XRAY VIEW OF THE CHEST 1/24/2021 9:05 pm COMPARISON: 05/25/2014. HISTORY: ORDERING SYSTEM PROVIDED HISTORY: vertigo TECHNOLOGIST PROVIDED HISTORY: Reason for exam:->vertigo What reading provider will be dictating this exam?->CRC FINDINGS: The heart is mildly enlarged and more prominent. The pulmonary vessels are engorged centrally and more prominent. There is a questionable moderate left pleural effusion extending to the mid chest with hazy left basilar opacity. There is some hazy right basilar opacity medially    Mild cardiomegaly and mild central pulmonary congestion which is more prominent. Questionable moderate left pleural effusion and associated left basilar atelectasis and consolidation. Suggest a follow-up PA and lateral chest Hazy right basilar opacity medially which could represent atelectasis or infiltrate. EKG: See Report  Echo: See Report      IMPRESSIONS:  Active Problems:    Essential hypertension, benign    Dementia (HCC)    Pericardial effusion    Pain syndrome, chronic  Resolved Problems:    * No resolved hospital problems. *      RECOMMENDATIONS:  I have explained to Mrs. Soto that she indeed has underlying severe calcific aortic valve stenosis yet with well-preserved left ventricular systolic function. If she indeed has been functional up until recently I see no reason why she could not be considered for a TAVR  procedure. Await paracardial fluid pathologic examination with the hope of determining the etiology of her underlying pericardial effusion. She asked that I speak with her son tomorrow. I have spent more than 25 minutes face to face with Giovanni Mcallister and reviewing notes and laboratory data, with greater than 50% of this time instructing and counseling the patient face to face regarding my findings and recommendations and I have answered all questions as posed to me by Ms. Soto. Sky Javed,  FACP,FACC,FSCAI      NOTE:  This report was transcribed using voice recognition software.   Every effort was made to ensure accuracy; however, inadvertent computerized transcription errors may be present

## 2021-01-28 NOTE — CARE COORDINATION
SOCIAL WORK/CASEMANAGEMENT TRANSITION OF CARE [id number], [address], [place], [phone]): chest tubes pulled and rn to work on getting sitter removed from pt. Plan is to [facility] care on discharge. precert not needed but will need updated PT and OT notes within 48 hours of discharge. Left rapid covid test in the soft chart with all discharge paperwork with exempt. Zaid/cm to follow.  Kacie Kamara  1/28/2021

## 2021-01-28 NOTE — PROGRESS NOTES
Chief Complaint:  Pericardial effusion    Subjective: The patient is alert. Not feeling good. No new problems overnight. Denies chest pain & SOB . Denies abdominal pain. Tolerating diet. No nausea or vomiting. Echo in progress    Objective:    Vitals:    01/28/21 0339   BP: 103/65   Pulse: 75   Resp: 16   Temp: 98.5 °F (36.9 °C)   SpO2: 96%     A&O x's 3, pericardial drain rmoved, NAD  Heart:  RRR, systolic right sternal  Murmur, no gallops, or rubs. Lungs:  Diminished but CTA bilaterally, no wheeze, rales or rhonchi  Abd: bowel sounds present, nontender, nondistended, no masses  Extrem:  No clubbing, cyanosis, or edema  Tele: NSR    Lab Results   Component Value Date     01/27/2021    K 3.6 01/27/2021    K 3.9 01/24/2021     01/27/2021    CO2 23 01/27/2021    BUN 20 01/27/2021    CREATININE 0.8 01/27/2021    CALCIUM 8.5 01/27/2021      Lab Results   Component Value Date    WBC 8.4 01/27/2021    RBC 4.49 01/27/2021    HGB 11.6 01/27/2021    HCT 37.7 01/27/2021    MCV 84.0 01/27/2021    MCH 25.8 01/27/2021    MCHC 30.8 01/27/2021    RDW 16.7 01/27/2021     01/27/2021    MPV 14.1 01/27/2021     PT/INR:    Lab Results   Component Value Date    PROTIME 16.4 01/24/2021    PROTIME 11.5 06/24/2011    INR 1.5 01/24/2021     No results for input(s): POCGLU in the last 72 hours. Recent Labs     01/25/21  1418 01/26/21  0551 01/27/21  0522    141 142   K 3.8 3.5 3.6    107 108*   CO2 23 25 23   BUN 12 14 20   CREATININE 0.8 0.8 0.8   CALCIUM 9.1 8.3* 8.5*   GFRAA >60 >60 >60   LABGLOM >60 >60 >60   GLUCOSE 105* 107* 97       Xr Chest (2 Vw)    Result Date: 1/24/2021  EXAMINATION: TWO XRAY VIEWS OF THE CHEST 1/24/2021 11:09 pm COMPARISON: 01/24/2021 HISTORY: ORDERING SYSTEM PROVIDED HISTORY: vertigo, confusion TECHNOLOGIST PROVIDED HISTORY: Reason for exam:->vertigo, confusion What reading provider will be dictating this exam?->CRC FINDINGS: The heart is mildly enlarged and unchanged. The pulmonary vessels are engorged centrally and more prominent. There is hazy opacity along the left mid lung extending inferiorly with mild blunting of the left costophrenic sulcus which is unchanged. There is hazy right infrahilar opacity extending inferiorly which is more prominent. No pneumothorax is seen. The bones are intact. Stable cardiomegaly with increasing central pulmonary congestion. Hazy left basilar atelectasis and/or consolidation and probable mild to moderate left pleural effusion which is unchanged Increasing right infrahilar opacity extending inferiorly. Ct Head Wo Contrast    Result Date: 1/24/2021  EXAMINATION: CT OF THE HEAD WITHOUT CONTRAST  1/24/2021 9:52 pm TECHNIQUE: CT of the head was performed without the administration of intravenous contrast. Dose modulation, iterative reconstruction, and/or weight based adjustment of the mA/kV was utilized to reduce the radiation dose to as low as reasonably achievable. COMPARISON: 12/25/2014 HISTORY: ORDERING SYSTEM PROVIDED HISTORY: vertigo TECHNOLOGIST PROVIDED HISTORY: Reason for exam:->vertigo Has a \"code stroke\" or \"stroke alert\" been called? ->No Decision Support Exception->Emergency Medical Condition (MA) What reading provider will be dictating this exam?->CRC FINDINGS: BRAIN/VENTRICLES: The ventricles are mildly enlarged with diffuse mild prominence of the cortical sulci. There is moderate periventricular low density bilaterally which is unchanged. No intracranial hemorrhage or edema is seen. There is no extra-axial fluid collection or mass. There are small old lacunar infarcts in the scattered in the deep white matter bilaterally which are unchanged. ORBITS: The visualized portion of the orbits demonstrate no acute abnormality. SINUSES: The visualized paranasal sinuses and mastoid air cells demonstrate no acute abnormality. SOFT TISSUES/SKULL:  No acute abnormality of the visualized skull or soft tissues.     Mild atrophy and moderate chronic microischemic disease throughout the deep white matter which is unchanged with no acute abnormality seen. Ct Chest Wo Contrast    Result Date: 1/25/2021  EXAMINATION: CT OF THE CHEST WITHOUT CONTRAST 1/25/2021 12:07 am TECHNIQUE: CT of the chest was performed without the administration of intravenous contrast. Multiplanar reformatted images are provided for review. Dose modulation, iterative reconstruction, and/or weight based adjustment of the mA/kV was utilized to reduce the radiation dose to as low as reasonably achievable. COMPARISON: None. HISTORY: ORDERING SYSTEM PROVIDED HISTORY: sob, pleural effusion, ? pneumonia TECHNOLOGIST PROVIDED HISTORY: Reason for exam:->sob, pleural effusion, ? pneumonia What reading provider will be dictating this exam?->CRC FINDINGS: Lines and tubes:  None. Lungs and Airways and Pleura:  Central airways are patent. Moderate left and small right pericardial pleural effusion. Focal consolidative changes of the left lower lobe and the medial aspect of the lingula may be related to atelectasis due to adjacent effusion or may represent pneumonia. Lymph nodes: No pathologically enlarged mediastinal, hilar, lower cervical, or chest wall lymph nodes. Cardiovascular and Mediastinum:  Massive pericardial effusion with significant constriction of the heart highly concerning for pericardial tamponade. Moderate amount of calcification of the coronary arteries and thoracic aorta. Bones/Soft tissues:  No definite suspicious lytic or blastic foci. Moderate degenerative changes of thoracic aorta. Upper abdomen: Multiple foci of calcification within the kidneys are likely related to combination of nonobstructing stone and vascular calcification. Simple cyst is noted within the midpole of the left kidney. A status post cholecystectomy visualized aspects of the upper abdomen are of unremarkable.     Massive pericardial effusion with significant constriction of the heart highly moderate left pleural effusion and associated left basilar atelectasis and consolidation. Suggest a follow-up PA and lateral chest Hazy right basilar opacity medially which could represent atelectasis or infiltrate. Scheduled Meds:   sodium chloride flush  10 mL Intravenous 2 times per day    ceFAZolin  2,000 mg Intravenous See Admin Instructions    enoxaparin  40 mg Subcutaneous Daily    amLODIPine  5 mg Oral Daily    aspirin  325 mg Oral Daily    lisinopril  10 mg Oral Daily    venlafaxine  75 mg Oral Daily    sodium chloride flush  10 mL Intravenous 2 times per day    sennosides-docusate sodium  1 tablet Oral BID     Continuous Infusions:   lactated ringers 75 mL/hr at 01/27/21 2028    sodium chloride       PRN Meds:.perflutren lipid microspheres, sodium chloride flush, acetaminophen, sodium chloride, sodium chloride flush, oxyCODONE, magnesium hydroxide, bisacodyl, ondansetron    I/O last 3 completed shifts: In: 6359 [P.O.:360; I.V.:2416]  Out: 1845 [MBQQB:3577; Chest Tube:50]  No intake/output data recorded.     Intake/Output Summary (Last 24 hours) at 1/28/2021 0720  Last data filed at 1/28/2021 2082  Gross per 24 hour   Intake 2776 ml   Output 1845 ml   Net 931 ml       Assessment:    Active Hospital Problems    Diagnosis    Pericardial effusion [I31.3]    Pain syndrome, chronic [G89.4]    Essential hypertension, benign [I10]    Dementia ([facility] [address] [F03.90]       Plan:  Surgical findings noted--1405 cc removed             Cardiac consult reviewed             Cultures pending--sterile thus far             Cytology suspicious for adeno ca             Sed rate nl             Echo nl EF, severe AS             Lab reviewed--nl             BP well controlled        Aiyana Moreno DO  7:20 AM  1/28/2021

## 2021-01-28 NOTE — PROGRESS NOTES
OT BEDSIDE TREATMENT NOTE      Date:2021  Patient Name: Arcadio Solorzano  MRN: [id number]  : 1935  Room: [address] [place] [address]     Per OT Eval:    Evaluating OT: Brad Casanova OTR/L #[id number]      AM-PAC Daily Activity Raw Score: 15/24     Recommended Adaptive Equipment:  TBD      Diagnosis: Pericardial Effusion    Patient presented to the hospital with dizziness     Surgery: 21 Ultrasound guided pericardiocentesis with drain     Pertinent Medical History:    Past Medical History        Past Medical History:   Diagnosis Date    Accidental drug overdose 2014    Acute delirium 2014    Anxiety      Arthritis      Dizziness, nonspecific      Generalized headaches      Heart murmur       stable per pt    Hypertension      Knee pain       right    Malnutrition ([address] 2014    Migraine headache      Polypharmacy 2014    Reflux gastritis      Rhabdomyolysis 2014    Sinus problem           Precautions:  Falls, chest tube to suction, , bed alarm     Home Living: Pt lives alone in a 2 story home with 3 SAMIR and B hand rails. Bed/bath on 2nd floor   Bathroom setup: sponge bathes;   Pt reports tub/shower combo not currently working   Equipment owned: none     Prior Level of Function: Independent with ADLs , Independent with IADLs; ambulated without AD  Driving: yes  Occupation: retired nurse     Pain Level: Pt did not complain of pain this session      Cognition: A&O: 4/4; Follows 1 step directions              Memory:  good              Sequencing:  fair              Problem solving:  fair              Judgement/safety:  Fair-              + anxiety, requiring emotional support throughout session                 Functional Assessment:    Initial Eval Status  Date: 21 Treatment Status  Date: 21 Short Term Goals = Long Term Goals  Treatment frequency: 1-4x/wk; PRN   Feeding Set-up   Setup  Pt seated upright in chair eating of breakfast meal indep    Grooming Minimal Assist      standing SBA  Pt washed face, combed hair seated upright in chair at sink Supervision    UB Dressing Minimal Assist  Jordan  [facility] gown seated upright in chair at sink  Supervision    LB Dressing Maximal Assist   maxA  [facility] socks seated upright in chair at sink Minimal Assist    Bathing Maximal Assist maxA  Pt completed sponge bathing task seated/standing at sink, with pt able to wash of UB with cueing for follow through, requiring assistance with LB and to stand and wash of buttocks/jyoti area Minimal Assist    Toileting Maximal Assist   maxA  Pt completed toileting task on standard commode, requiring assistance with hygiene, clothing management and transfer with use of grab bars Minimal Assist    Bed Mobility  Supine to sit: Moderate Assist   Sit to supine: NT  DNT  modA per previous level    Pt seated upright in chair upon arrival and at end of session  Supine to sit: Stand by Assist   Sit to supine: Stand by Assist    Functional Transfers Minimal Assist  Jordan/modA  Sit to Stand  Stand to Sit     Cueing for hand placement Supervision    Functional Mobility Minimal Assist      Short ambulation tasks in room with w/w  Jordan  Pt ambulated short distance in room with w.w, with second person assist to manage of IV lines/pole and tubes, cueing for safety and assistance with walker management Supervision    Balance Sitting:     Static:  SBA    Dynamic:min A  Standing: min A Sitting Supported:  SBA    Standing:  Jordan      Activity Tolerance F-  Fair- F+   Visual/  Perceptual Glasses: yes  wfl                             Hand dominance: right       Strength ROM Additional Info:    RUE   wfl wfl good  and fair FMC/dexterity noted during ADL tasks      LUE wfl    wfl good  and fair FMC/dexterity noted during ADL tasks   Deferred formal MMT due to chest tube pain  Hearing: wfl  Sensation:wfl  Tone: wfl  Edema:none noted         Education:  Pt was educated through out treatment regarding proper technique & safety with functional transfers & mobility, ADL compensatory strategies to ease tasks to improve safety & prevent falls and allow pt to return home safely. Comments: Upon arrival pt seated upright in chair, agreeable to therapy, sitter present, speaking with nursing okaying pt to be seen this session. Pt being confused throughout session, requiring cueing for redirection and follow through throughout session to complete tasks and for safety. At end of session, pt seated upright in chair eating of breakfast meal, sitter present, all lines and tubes intact, call light within reach. · Pt has made fair progress towards set goals.    · Continue with current plan of care focusing on increasing of independency with transfers and ADL tasks      Treatment Time In: 7:55am           Treatment Time Out: 8:25am             Treatment Charges: Mins Units   Ther Ex  60124     Manual Therapy 84402     Thera Activities 71347 12 1   ADL/Home Mgt 15443 18 1   Neuro Re-ed 62627     Group Therapy      Orthotic manage/training  07013     Non-Billable Time     Total Timed Treatment 30 2        Mai Dove LUNA/L [id number]

## 2021-01-28 NOTE — PROGRESS NOTES
POD#3  Awake, alert. No complaints. Denies CP, palpitations, SOB at rest, dizziness/lightheadedness. Vitals:    01/28/21 0004 01/28/21 0339 01/28/21 0845 01/28/21 0900   BP: 110/70 103/65  (!) 92/54   Pulse: 79 75     Resp: 16 16 18    Temp: 97.4 °F (36.3 °C) 98.5 °F (36.9 °C) 98 °F (36.7 °C)    TempSrc: Oral Oral Temporal    SpO2: 97% 96% 99%    Weight:  147 lb 1.6 oz (66.7 kg)     Height:         RA      Intake/Output Summary (Last 24 hours) at 1/28/2021 0931  Last data filed at 1/28/2021 5403  Gross per 24 hour   Intake 2776 ml   Output 1815 ml   Net 961 ml         Recent Labs     01/25/21  1418 01/26/21  0551 01/27/21  0522   WBC 5.7 10.7 8.4   HGB 11.1* 11.8 11.6   HCT 35.8 38.3 37.7   * 137 128*      Recent Labs     01/26/21  0551 01/27/21  0522 01/28/21  0618   BUN 14 20 15   CREATININE 0.8 0.8 0.7           Telemetry: Afib       PE  Cardiac: IRRR  Lungs: decreased bases  Chest Chest tubes x 1   Abd: Soft, nontender, +BS  Ext: GALVAN           A/P: POD# 3 s/p Ultrasound guided pericardiocentesis with drain     Afib over night POD 1 and still in Afib currently with HR controlled in the 90s - cardiology notified and ordered digoxin and echo that day. Will defer to cardiology for afib management   Echo showed severe AS- Will talk with TAVR coordinator  about outpatient follow up at valve clinic   cytology noted, await final cytology   CT without significant drainage, will remove today. Chest tube(s) removed without difficulty. Patient tolerated well  Increase activity as tolerated   CTS to sign off, okay for DC per CTS when okay with all other services.

## 2021-01-29 VITALS
SYSTOLIC BLOOD PRESSURE: 110 MMHG | HEART RATE: 85 BPM | RESPIRATION RATE: 18 BRPM | DIASTOLIC BLOOD PRESSURE: 68 MMHG | BODY MASS INDEX: 22.39 KG/M2 | WEIGHT: 151.2 LBS | HEIGHT: 69 IN | TEMPERATURE: 96.9 F | OXYGEN SATURATION: 96 %

## 2021-01-29 PROBLEM — I35.0 NONRHEUMATIC AORTIC VALVE STENOSIS: Status: ACTIVE | Noted: 2021-01-29

## 2021-01-29 LAB
ANION GAP SERPL CALCULATED.3IONS-SCNC: 9 MMOL/L (ref 7–16)
BUN BLDV-MCNC: 14 MG/DL (ref 8–23)
CALCIUM SERPL-MCNC: 8.5 MG/DL (ref 8.6–10.2)
CHLORIDE BLD-SCNC: 108 MMOL/L (ref 98–107)
CO2: 24 MMOL/L (ref 22–29)
CREAT SERPL-MCNC: 0.7 MG/DL (ref 0.5–1)
GFR AFRICAN AMERICAN: >60
GFR NON-AFRICAN AMERICAN: >60 ML/MIN/1.73
GLUCOSE BLD-MCNC: 98 MG/DL (ref 74–99)
POTASSIUM SERPL-SCNC: 4 MMOL/L (ref 3.5–5)
SARS-COV-2, NAAT: NOT DETECTED
SODIUM BLD-SCNC: 141 MMOL/L (ref 132–146)

## 2021-01-29 PROCEDURE — 36415 COLL VENOUS BLD VENIPUNCTURE: CPT

## 2021-01-29 PROCEDURE — 6370000000 HC RX 637 (ALT 250 FOR IP): Performed by: THORACIC SURGERY (CARDIOTHORACIC VASCULAR SURGERY)

## 2021-01-29 PROCEDURE — 6370000000 HC RX 637 (ALT 250 FOR IP): Performed by: INTERNAL MEDICINE

## 2021-01-29 PROCEDURE — 6360000002 HC RX W HCPCS: Performed by: THORACIC SURGERY (CARDIOTHORACIC VASCULAR SURGERY)

## 2021-01-29 PROCEDURE — 80048 BASIC METABOLIC PNL TOTAL CA: CPT

## 2021-01-29 PROCEDURE — 97530 THERAPEUTIC ACTIVITIES: CPT

## 2021-01-29 PROCEDURE — U0002 COVID-19 LAB TEST NON-CDC: HCPCS

## 2021-01-29 RX ORDER — SENNA AND DOCUSATE SODIUM 50; 8.6 MG/1; MG/1
1 TABLET, FILM COATED ORAL 2 TIMES DAILY
DISCHARGE
Start: 2021-01-29

## 2021-01-29 RX ORDER — BISACODYL 10 MG
10 SUPPOSITORY, RECTAL RECTAL DAILY PRN
Status: ON HOLD | DISCHARGE
Start: 2021-01-29 | End: 2021-03-02 | Stop reason: HOSPADM

## 2021-01-29 RX ORDER — MECLIZINE HCL 12.5 MG/1
12.5 TABLET ORAL 2 TIMES DAILY
Qty: 20 TABLET | Refills: 0 | DISCHARGE
Start: 2021-01-29 | End: 2021-02-08

## 2021-01-29 RX ADMIN — ENOXAPARIN SODIUM 40 MG: 40 INJECTION SUBCUTANEOUS at 08:32

## 2021-01-29 RX ADMIN — ACETAMINOPHEN 650 MG: 325 TABLET ORAL at 00:32

## 2021-01-29 RX ADMIN — MECLIZINE 12.5 MG: 12.5 TABLET ORAL at 08:29

## 2021-01-29 RX ADMIN — LISINOPRIL 10 MG: 10 TABLET ORAL at 08:29

## 2021-01-29 RX ADMIN — DOCUSATE SODIUM 50 MG AND SENNOSIDES 8.6 MG 1 TABLET: 8.6; 5 TABLET, FILM COATED ORAL at 08:29

## 2021-01-29 RX ADMIN — VENLAFAXINE HYDROCHLORIDE 75 MG: 75 TABLET ORAL at 08:29

## 2021-01-29 RX ADMIN — ASPIRIN 325 MG: 325 TABLET, COATED ORAL at 08:29

## 2021-01-29 RX ADMIN — AMLODIPINE BESYLATE 5 MG: 5 TABLET ORAL at 08:29

## 2021-01-29 NOTE — PROGRESS NOTES
Chief Complaint:  Pericardial effusion    Subjective: The patient is alert. Not feeling good. No new problems overnight. Denies chest pain & SOB . Denies abdominal pain. Tolerating diet. No nausea or vomiting. Echo in progress    Objective:    Vitals:    01/29/21 0838   BP: 122/74   Pulse: 70   Resp: 16   Temp: 96.8 °F (36 °C)   SpO2: 96%     A&O x's 3, pericardial drain rmoved, NAD  Heart:  RRR, systolic right sternal murmur, no gallops, or rubs. Lungs:  Diminished but CTA bilaterally, no wheeze, rales or rhonchi  Abd: bowel sounds present, nontender, nondistended, no masses  Extrem:  No clubbing, cyanosis, or edema  Tele: NSR    Lab Results   Component Value Date     01/29/2021    K 4.0 01/29/2021    K 3.9 01/24/2021     01/29/2021    CO2 24 01/29/2021    BUN 14 01/29/2021    CREATININE 0.7 01/29/2021    CALCIUM 8.5 01/29/2021      Lab Results   Component Value Date    WBC 8.4 01/27/2021    RBC 4.49 01/27/2021    HGB 11.6 01/27/2021    HCT 37.7 01/27/2021    MCV 84.0 01/27/2021    MCH 25.8 01/27/2021    MCHC 30.8 01/27/2021    RDW 16.7 01/27/2021     01/27/2021    MPV 14.1 01/27/2021     PT/INR:    Lab Results   Component Value Date    PROTIME 16.4 01/24/2021    PROTIME 11.5 06/24/2011    INR 1.5 01/24/2021     No results for input(s): POCGLU in the last 72 hours. Recent Labs     01/27/21  0522 01/28/21  0618 01/29/21  0615    139 141   K 3.6 3.7 4.0   * 106 108*   CO2 23 26 24   BUN 20 15 14   CREATININE 0.8 0.7 0.7   CALCIUM 8.5* 8.1* 8.5*   GFRAA >60 >60 >60   LABGLOM >60 >60 >60   GLUCOSE 97 91 98       Xr Chest (2 Vw)    Result Date: 1/24/2021  EXAMINATION: TWO XRAY VIEWS OF THE CHEST 1/24/2021 11:09 pm COMPARISON: 01/24/2021 HISTORY: ORDERING SYSTEM PROVIDED HISTORY: vertigo, confusion TECHNOLOGIST PROVIDED HISTORY: Reason for exam:->vertigo, confusion What reading provider will be dictating this exam?->CRC FINDINGS: The heart is mildly enlarged and unchanged.   The moderate chronic microischemic disease throughout the deep white matter which is unchanged with no acute abnormality seen. Ct Chest Wo Contrast    Result Date: 1/25/2021  EXAMINATION: CT OF THE CHEST WITHOUT CONTRAST 1/25/2021 12:07 am TECHNIQUE: CT of the chest was performed without the administration of intravenous contrast. Multiplanar reformatted images are provided for review. Dose modulation, iterative reconstruction, and/or weight based adjustment of the mA/kV was utilized to reduce the radiation dose to as low as reasonably achievable. COMPARISON: None. HISTORY: ORDERING SYSTEM PROVIDED HISTORY: sob, pleural effusion, ? pneumonia TECHNOLOGIST PROVIDED HISTORY: Reason for exam:->sob, pleural effusion, ? pneumonia What reading provider will be dictating this exam?->CRC FINDINGS: Lines and tubes:  None. Lungs and Airways and Pleura:  Central airways are patent. Moderate left and small right pericardial pleural effusion. Focal consolidative changes of the left lower lobe and the medial aspect of the lingula may be related to atelectasis due to adjacent effusion or may represent pneumonia. Lymph nodes: No pathologically enlarged mediastinal, hilar, lower cervical, or chest wall lymph nodes. Cardiovascular and Mediastinum:  Massive pericardial effusion with significant constriction of the heart highly concerning for pericardial tamponade. Moderate amount of calcification of the coronary arteries and thoracic aorta. Bones/Soft tissues:  No definite suspicious lytic or blastic foci. Moderate degenerative changes of thoracic aorta. Upper abdomen: Multiple foci of calcification within the kidneys are likely related to combination of nonobstructing stone and vascular calcification. Simple cyst is noted within the midpole of the left kidney. A status post cholecystectomy visualized aspects of the upper abdomen are of unremarkable.     Massive pericardial effusion with significant constriction of the heart highly concerning for pericardial tamponade. Moderate left and small right pericardial pleural effusion . Focal consolidative changes of the left lower lobe and the medial aspect of the lingula may be related to atelectasis due to adjacent effusion or may represent pneumonia. Critical results were called by Dr. Sahnnon Naqvi MD to Juvenal January on 1/25/2021 at 00:27. Xr Chest Portable    Result Date: 1/25/2021  EXAMINATION: ONE XRAY VIEW OF THE CHEST 1/25/2021 3:17 pm COMPARISON: 01/24/2021 HISTORY: ORDERING SYSTEM PROVIDED HISTORY: evaluate lung fields, tube placement TECHNOLOGIST PROVIDED HISTORY: Reason for exam:->evaluate lung fields, tube placement What reading provider will be dictating this exam?->CRC FINDINGS: Status post placement of left-sided chest tube. Decreased opacities in left upper lung field. Opacities persist in left lung base silhouetting left heart border and left hemidiaphragm. There is improved aeration in right hilar location and right lung base. No evidence of pneumothorax. 1.  Status post placement of left chest tube. 2.  Improved aeration in left upper lung field and left hilar location. Effusion or atelectasis persists in left lung base. 3.  Improved aeration in right hilar location and right lung base. Xr Chest Portable    Result Date: 1/24/2021  EXAMINATION: ONE XRAY VIEW OF THE CHEST 1/24/2021 9:05 pm COMPARISON: 05/25/2014. HISTORY: ORDERING SYSTEM PROVIDED HISTORY: vertigo TECHNOLOGIST PROVIDED HISTORY: Reason for exam:->vertigo What reading provider will be dictating this exam?->CRC FINDINGS: The heart is mildly enlarged and more prominent. The pulmonary vessels are engorged centrally and more prominent. There is a questionable moderate left pleural effusion extending to the mid chest with hazy left basilar opacity. There is some hazy right basilar opacity medially    Mild cardiomegaly and mild central pulmonary congestion which is more prominent.  Questionable moderate left pleural effusion and associated left basilar atelectasis and consolidation. Suggest a follow-up PA and lateral chest Hazy right basilar opacity medially which could represent atelectasis or infiltrate. Scheduled Meds:   meclizine  12.5 mg Oral BID    sodium chloride flush  10 mL Intravenous 2 times per day    ceFAZolin  2,000 mg Intravenous See Admin Instructions    enoxaparin  40 mg Subcutaneous Daily    amLODIPine  5 mg Oral Daily    aspirin  325 mg Oral Daily    lisinopril  10 mg Oral Daily    venlafaxine  75 mg Oral Daily    sodium chloride flush  10 mL Intravenous 2 times per day    sennosides-docusate sodium  1 tablet Oral BID     Continuous Infusions:   lactated ringers 75 mL/hr at 01/28/21 2316    sodium chloride       PRN Meds:.perflutren lipid microspheres, sodium chloride flush, acetaminophen, sodium chloride, sodium chloride flush, oxyCODONE, magnesium hydroxide, bisacodyl, ondansetron    I/O last 3 completed shifts: In: 80 [P.O.:580]  Out: 1500 [Urine:1500]  No intake/output data recorded.     Intake/Output Summary (Last 24 hours) at 1/29/2021 0855  Last data filed at 1/29/2021 4634  Gross per 24 hour   Intake 580 ml   Output 1500 ml   Net -920 ml       Assessment:    Active Hospital Problems    Diagnosis    Pericardial effusion [I31.3]    Pain syndrome, chronic [G89.4]    Essential hypertension, benign [I10]    Dementia ([facility] [address] [F03.90]       Plan:  Surgical findings noted--1405 cc removed             Cardiac consult reviewed             Cultures sterile thus far               Cytology suspicious for adeno ca             Sed rate nl             Echo nl EF, severe AS             Lab reviewed--nl             BP well controlled             CTS sign off      Scooby Thompson DO  8:55 AM  1/29/2021

## 2021-01-29 NOTE — PROGRESS NOTES
Physical Therapy  Treatment Note     Name: Sky Bell  : 1935  MRN: [id number]    Referring Provider:  Abdifatah Carmona MD    Date of Service: 2021    Evaluating PT:  Hernando Kearney PT, DPT [id number]    Room #:  2984/3671-D  Diagnosis:  Pericardial effusion  Precautions: Falls,  Bed alarm, TSM  Procedure/Surgery:   US guided pericardiocentesis with drain  PMHx/PSHx:  HTN  Equipment Needs:  TBD    SUBJECTIVE:    Pt lives alone in a 2 story home with 3 stairs to enter. Full flight of steps and 1 rail to bedroom. Pt ambulated without device and was independent with ADLs PTA. Son assisted with medication. OBJECTIVE:   Initial Evaluation  Date: 21 Treatment   Short Term/ Long Term   Goals   AM-PAC 6 Clicks 89/95     Was pt agreeable to Eval/treatment? Yes Yes     Does pt have pain? 3-4/10 L neck and B knee pain No c/o pain    Bed Mobility  Rolling: NT  Supine to sit: SBA with HOB elevated  Sit to supine: SBA  Scooting: SBA Rolling: SBA  Supine to sit: SBA  Sit to supine: Min A  Scooting: SBA  Mod Independent   Transfers Sit to stand: Jerrod  Stand to sit: Jerrod  Stand pivot: NT Sit to stand: Min A  Stand to sit: Min A  Stand pivot: Min A   Mod Independent with AAD   Ambulation   3 feet F/B with Jerrod with [facility] 10 feet x2 reps using [facility] with Min A  >150 feet with Mod Independent with AAD   Stair negotiation: ascended and descended NT NT >10 steps with 1 rail Mod Independent   ROM BUE:  WFL  BLE:  WFL     Strength BUE:  WFL  BLE:  4/5  Increase by 1/3 MMT grade   Balance Sitting EOB:  Jerrod dynamic  Dynamic Standing:  Jerrod with [facility] Sitting EOB:  SBA dynamic  Dynamic Standing:  Jerrod with [facility] Sitting EOB:  Independent  Dynamic Standing:   Mod Independent with AAD     Pt is A & O x 3; but pt was confused and speaking tangentially  Sensation: NA  Edema:  None    Therapeutic Exercises:  NA    Patient education  Pt educated on safety    Patient response to education:   Pt verbalized understanding Pt demonstrated skill Pt requires further education in this area   partial partial yes     ASSESSMENT:    Comments:  Pt was in bed upon arrival, agreeable to treatment session. Pt required increased time for all mobility and c/o dizziness that increased pt's anxiety with mobility. Pt assisted on/off commode with Min A. Pt amb with shuffled gait and poor safety awareness. Pt was assisted back to bed and left with bed alarm set. Treatment:  Patient practiced and was instructed in the following treatment:     Bed mobility training - pt given verbal and tactile cues to facilitate proper sequencing and safety during supine>sit as well as provided with physical assistance to complete task    Sitting EOB for >8 minutes for upright tolerance, postural awareness and BLE ROM.  Transfer training - pt was given verbal and tactile cues to facilitate proper hand placement, technique and safety during sit to stand and stand to sit as well as provided with physical assistance to complete task.  Gait training- pt was given verbal and tactile cues to facilitate safety, balance and use of AD during ambulation as well as provided with physical assistance to complete task.  Commode transfer with VCs for safety, hand placement and for lift from commode. PLAN OF CARE:    Current Treatment Recommendations     [x] Strengthening     [] ROM   [x] Balance Training   [x] Endurance Training   [x] Transfer Training   [x] Gait Training   [x] Stair Training   [] Positioning   [x] Safety and Education Training   [x] Patient/Caregiver Education   [] HEP  [] Other     Patient is making fair progress. Continue with current POC.      Time in  1030  Time out  1055    Total Treatment Time  25 minutes     CPT codes:  [] Low Complexity PT evaluation 00132  [] Moderate Complexity PT evaluation 59238  [] High Complexity PT evaluation 84980  [] PT Re-evaluation 25682  [] Gait training 15774 - minutes  [] Manual therapy 99624 - minutes  [x] Therapeutic activities 70583 25 minutes  [] Therapeutic exercises 18452 - minutes  [] Neuromuscular reeducation 15574 - minutes     Alina Mccray PT, DPT  License [id number]

## 2021-01-29 NOTE — PROGRESS NOTES
SOCIAL WORK/CASEMANAGEMENT TRANSITION OF CARE [id number] [facility], [address], [place], [phone]): per pcp pt can go to [facility] after dr. Nathaniel Garcia comes in around 1 p.m. to talk with son, Yeny Xavier, about heart cath. Kim Abarca with pt in the room and in agreement. Veverly Cheadle from [facility] has their transport set up for 4 p.m. snf;loc. Rapid covid in the soft chart.  [facility] Hope  1/29/2021

## 2021-01-29 NOTE — DISCHARGE SUMMARY
Physician Discharge Summary     Patient ID:  Ayla Garcia  [id number]  61 y.o.  1935    Admit date: 1/24/2021    Discharge date and time: 1/29/2021     Admission Diagnoses: Pericardial effusion [I31.3]    Discharge Diagnoses: Active Hospital Problems    Diagnosis    Nonrheumatic aortic valve stenosis [I35.0]    Pericardial effusion [I31.3]    Pain syndrome, chronic [G89.4]    Essential hypertension, benign [I10]    Dementia ([facility] [address] [F03.90]       Consults: cardiology(Mary) and CTS(Shayla)    Procedures: pericardiocentesis--1405 cc    Hospital Course:  History reviewed with the patients son and PCP. The patient is a 82 y.o. female patient of Dr. Denise Ardon presents with a dizzy feeling over the past 2 weeks and wiith vertigo(room spinning) immediately prior to the ER visit. She self tx with Dramamine w/o improvement. There is no past history of cardiac or pulmonary disorders with the exception of a known heart murmur. She denied  fever, chills or sweats. The patient is cognitively impaired d/t dementia. She is on medication for HTN and morphine for chronic low back pain. Upon admission a massive pericardial effusion was identified. Urgent pericardiocentesis was performed with 1405 cc fluid removed. Echo also confirmed critical aortic stenosis with a 100 mmHg gradient. Above findings d/w with the patients son. Final family decisions are pending at this time on further investigation in view of her mental status. The patient is discharged to [facility] in improved and stable condition with OP f/u as directed.      CBC with Differential:    Lab Results   Component Value Date    WBC 8.4 01/27/2021    RBC 4.49 01/27/2021    HGB 11.6 01/27/2021    HCT 37.7 01/27/2021     01/27/2021    MCV 84.0 01/27/2021    MCH 25.8 01/27/2021    MCHC 30.8 01/27/2021    RDW 16.7 01/27/2021    SEGSPCT 72 02/22/2014    LYMPHOPCT 11.9 01/24/2021    MONOPCT 9.6 01/24/2021    BASOPCT 0.6 01/24/2021    MONOSABS 0.65 01/24/2021 LYMPHSABS 0.81 01/24/2021    EOSABS 0.11 01/24/2021    BASOSABS 0.04 01/24/2021     CMP:    Lab Results   Component Value Date     01/29/2021    K 4.0 01/29/2021    K 3.9 01/24/2021     01/29/2021    CO2 24 01/29/2021    BUN 14 01/29/2021    CREATININE 0.7 01/29/2021    GFRAA >60 01/29/2021    LABGLOM >60 01/29/2021    GLUCOSE 98 01/29/2021    GLUCOSE 90 05/16/2012    PROT 7.3 02/27/2019    LABALBU 4.5 02/27/2019    LABALBU 4.2 05/16/2012    CALCIUM 8.5 01/29/2021    BILITOT 0.5 02/27/2019    ALKPHOS 115 02/27/2019    AST 37 02/27/2019    ALT 26 02/27/2019     BMP:    Lab Results   Component Value Date     01/29/2021    K 4.0 01/29/2021    K 3.9 01/24/2021     01/29/2021    CO2 24 01/29/2021    BUN 14 01/29/2021    LABALBU 4.5 02/27/2019    LABALBU 4.2 05/16/2012    CREATININE 0.7 01/29/2021    CALCIUM 8.5 01/29/2021    GFRAA >60 01/29/2021    LABGLOM >60 01/29/2021    GLUCOSE 98 01/29/2021    GLUCOSE 90 05/16/2012     CT chest w/o contrastImpression  Massive pericardial effusion with significant constriction of the heart highly concerning for pericardial tamponade. Moderate left and small right pericardial pleural effusion . Focal consolidative changes of the left lower lobe and the medial aspect of the lingula may be related to atelectasis due to adjacent effusion or may  represent pneumonia. Pathology:    FINAL NON-GYNECOLOGIC CYTOLOGY REPORT DIAGNOSIS   SUSPICIOUS FOR, BUT NOT DIAGNOSTIC OF, MALIGNANCY   Suspicious for adenocarcinoma   Cellblock is similar.       Disposition: [facility]    Patient Instructions:     Medication List      START taking these medications    bisacodyl 10 MG suppository  Commonly known as: DULCOLAX  Place 1 suppository rectally daily as needed for Constipation     meclizine 12.5 MG tablet  Commonly known as: ANTIVERT  Take 1 tablet by mouth 2 times daily for 10 days     sennosides-docusate sodium 8.6-50 MG tablet  Commonly known as: SENOKOT-S  Take 1 tablet by mouth 2 times daily        CHANGE how you take these medications    amLODIPine 10 MG tablet  Commonly known as: NORVASC  Take 1 tablet by mouth daily. What changed: how much to take     Vitamin D3 1.25 MG (65060 UT) Caps  What changed: Another medication with the same name was removed. Continue taking this medication, and follow the directions you see here. CONTINUE taking these medications    aspirin 325 MG EC tablet  Take 1 tablet by mouth daily.      venlafaxine 37.5 MG tablet  Commonly known as: EFFEXOR        STOP taking these medications    bismuth subsalicylate 451 JZ/32AR suspension  Commonly known as: PEPTO BISMOL     HYDROcodone-acetaminophen 7.5-500 MG per tablet  Commonly known as: LORTAB     lisinopril 10 MG tablet  Commonly known as: PRINIVIL;ZESTRIL     LORazepam 1 MG tablet  Commonly known as: ATIVAN     morphine 15 MG extended release tablet  Commonly known as: MS CONTIN     omeprazole 20 MG delayed release capsule  Commonly known as: PRILOSEC           Where to Get Your Medications      Information about where to get these medications is not yet available    Ask your nurse or doctor about these medications  · bisacodyl 10 MG suppository  · meclizine 12.5 MG tablet  · sennosides-docusate sodium 8.6-50 MG tablet          Activity: activity as tolerated   Diet: regular diet    Follow-up with Dr. Ginette Soto in SNF  Note that over 30 minutes was spent in preparing discharge papers, discussing discharge with patient, medication review, etc.    Signed:  Bob Casey DO  1/29/2021  12:17 PM

## 2021-01-29 NOTE — PLAN OF CARE
Problem: Pain:  Goal: Pain level will decrease  Description: Pain level will decrease  Outcome: Met This Shift  Goal: Control of acute pain  Description: Control of acute pain  Outcome: Met This Shift  Goal: Control of chronic pain  Description: Control of chronic pain  Outcome: Met This Shift     Problem: Falls - Risk of:  Goal: Will remain free from falls  Description: Will remain free from falls  Outcome: Met This Shift  Goal: Absence of physical injury  Description: Absence of physical injury  Outcome: Met This Shift     Problem: Skin Integrity:  Goal: Will show no infection signs and symptoms  Description: Will show no infection signs and symptoms  Outcome: Met This Shift  Goal: Absence of new skin breakdown  Description: Absence of new skin breakdown  Outcome: Met This Shift     Problem: Musculor/Skeletal Functional Status  Goal: Highest potential functional level  Outcome: Met This Shift  Goal: Absence of falls  Outcome: Met This Shift     Problem: Cardiac:  Goal: Hemodynamic stability will improve  Description: Hemodynamic stability will improve  Outcome: Met This Shift  Goal: Complications related to the disease process, condition or treatment will be avoided or minimized  Description: Complications related to the disease process, condition or treatment will be avoided or minimized  Outcome: Met This Shift  Goal: Cerebral tissue perfusion will improve  Description: Cerebral tissue perfusion will improve  Outcome: Met This Shift

## 2021-01-29 NOTE — PATIENT CARE CONFERENCE
[facility] Quality Flow/Interdisciplinary Rounds Progress Note        Quality Flow Rounds held on January 29, 2021    Disciplines Attending:  Bedside Nurse, ,  and Nursing Unit Leadership    Shin Johnson was admitted on 1/24/2021  7:35 PM    Anticipated Discharge Date:  Expected Discharge Date: 01/30/21    Disposition:    Gerson Score:  Gerson Scale Score: 19    Readmission Risk              Risk of Unplanned Readmission:        12           Discussed patient goal for the day, patient clinical progression, and barriers to discharge. The following Goal(s) of the Day/Commitment(s) have been identified: IV Cefepime, IV Bumex, wound care debridement.       [address] [facility]  January 29, 2021

## 2021-01-30 LAB — ANAEROBIC CULTURE: NORMAL

## 2021-02-17 LAB
SARS-COV-2: NOT DETECTED
SOURCE: NORMAL

## 2021-02-18 DIAGNOSIS — I35.0 NONRHEUMATIC AORTIC VALVE STENOSIS: ICD-10-CM

## 2021-02-18 DIAGNOSIS — I35.0 NODULAR CALCIFIC AORTIC VALVE STENOSIS: Primary | ICD-10-CM

## 2021-02-18 DIAGNOSIS — Z01.810 PREOPERATIVE CARDIOVASCULAR EXAMINATION: Primary | ICD-10-CM

## 2021-02-18 RX ORDER — PREDNISONE 50 MG/1
TABLET ORAL
Qty: 3 TABLET | Refills: 0 | Status: SHIPPED | OUTPATIENT
Start: 2021-02-18 | End: 2021-02-18 | Stop reason: CLARIF

## 2021-02-18 RX ORDER — DIPHENHYDRAMINE HCL 50 MG
CAPSULE ORAL
Qty: 1 CAPSULE | Refills: 0 | Status: SHIPPED | OUTPATIENT
Start: 2021-02-18 | End: 2021-02-18 | Stop reason: CLARIF

## 2021-02-19 ENCOUNTER — HOSPITAL ENCOUNTER (INPATIENT)
Age: 86
LOS: 11 days | Discharge: HOME | DRG: 267 | End: 2021-03-02
Attending: EMERGENCY MEDICINE
Payer: MEDICARE

## 2021-02-19 ENCOUNTER — OFFICE VISIT (OUTPATIENT)
Dept: CARDIOLOGY CLINIC | Age: 86
End: 2021-02-19
Payer: MEDICARE

## 2021-02-19 ENCOUNTER — APPOINTMENT (OUTPATIENT)
Dept: GENERAL RADIOLOGY | Age: 86
DRG: 267 | End: 2021-02-19
Payer: MEDICARE

## 2021-02-19 ENCOUNTER — HOSPITAL ENCOUNTER (OUTPATIENT)
Dept: CT IMAGING | Age: 86
Discharge: HOME | DRG: 267 | End: 2021-02-21
Payer: MEDICARE

## 2021-02-19 VITALS
WEIGHT: 140 LBS | SYSTOLIC BLOOD PRESSURE: 108 MMHG | HEART RATE: 77 BPM | BODY MASS INDEX: 21.22 KG/M2 | RESPIRATION RATE: 18 BRPM | DIASTOLIC BLOOD PRESSURE: 60 MMHG | HEIGHT: 68 IN | OXYGEN SATURATION: 95 %

## 2021-02-19 DIAGNOSIS — I35.0 NODULAR CALCIFIC AORTIC VALVE STENOSIS: ICD-10-CM

## 2021-02-19 DIAGNOSIS — I31.39 PERICARDIAL EFFUSION: ICD-10-CM

## 2021-02-19 DIAGNOSIS — I35.0 CRITICAL AORTIC VALVE STENOSIS: Primary | ICD-10-CM

## 2021-02-19 DIAGNOSIS — R55 POSTURAL DIZZINESS WITH PRESYNCOPE: ICD-10-CM

## 2021-02-19 DIAGNOSIS — R42 POSTURAL DIZZINESS WITH PRESYNCOPE: ICD-10-CM

## 2021-02-19 DIAGNOSIS — G89.4 PAIN SYNDROME, CHRONIC: ICD-10-CM

## 2021-02-19 DIAGNOSIS — I10 ESSENTIAL HYPERTENSION: ICD-10-CM

## 2021-02-19 DIAGNOSIS — Z87.891 FORMER SMOKER: ICD-10-CM

## 2021-02-19 LAB
ALBUMIN SERPL-MCNC: 3.9 G/DL (ref 3.5–5.2)
ALBUMIN SERPL-MCNC: 4 G/DL (ref 3.5–5.2)
ALP BLD-CCNC: 71 U/L (ref 35–104)
ALP BLD-CCNC: 73 U/L (ref 35–104)
ALT SERPL-CCNC: 7 U/L (ref 0–32)
ALT SERPL-CCNC: 8 U/L (ref 0–32)
ANION GAP SERPL CALCULATED.3IONS-SCNC: 12 MMOL/L (ref 7–16)
ANION GAP SERPL CALCULATED.3IONS-SCNC: 13 MMOL/L (ref 7–16)
ANISOCYTOSIS: ABNORMAL
APTT: 28.6 SEC (ref 24.5–35.1)
AST SERPL-CCNC: 13 U/L (ref 0–31)
AST SERPL-CCNC: 13 U/L (ref 0–31)
BASOPHILS ABSOLUTE: 0.01 E9/L (ref 0–0.2)
BASOPHILS RELATIVE PERCENT: 0.2 % (ref 0–2)
BILIRUB SERPL-MCNC: 0.3 MG/DL (ref 0–1.2)
BILIRUB SERPL-MCNC: 0.4 MG/DL (ref 0–1.2)
BUN BLDV-MCNC: 27 MG/DL (ref 8–23)
BUN BLDV-MCNC: 32 MG/DL (ref 8–23)
CALCIUM SERPL-MCNC: 9.4 MG/DL (ref 8.6–10.2)
CALCIUM SERPL-MCNC: 9.5 MG/DL (ref 8.6–10.2)
CHLORIDE BLD-SCNC: 100 MMOL/L (ref 98–107)
CHLORIDE BLD-SCNC: 103 MMOL/L (ref 98–107)
CO2: 21 MMOL/L (ref 22–29)
CO2: 24 MMOL/L (ref 22–29)
CREAT SERPL-MCNC: 0.9 MG/DL (ref 0.5–1)
CREAT SERPL-MCNC: 1 MG/DL (ref 0.5–1)
EOSINOPHILS ABSOLUTE: 0 E9/L (ref 0.05–0.5)
EOSINOPHILS RELATIVE PERCENT: 0 % (ref 0–6)
GFR AFRICAN AMERICAN: >60
GFR AFRICAN AMERICAN: >60
GFR NON-AFRICAN AMERICAN: 53 ML/MIN/1.73
GFR NON-AFRICAN AMERICAN: 60 ML/MIN/1.73
GLUCOSE BLD-MCNC: 128 MG/DL (ref 74–99)
GLUCOSE BLD-MCNC: 263 MG/DL (ref 74–99)
HCT VFR BLD CALC: 40.5 % (ref 34–48)
HEMOGLOBIN: 12.1 G/DL (ref 11.5–15.5)
IMMATURE GRANULOCYTES #: 0.04 E9/L
IMMATURE GRANULOCYTES %: 0.7 % (ref 0–5)
INR BLD: 1.1
LYMPHOCYTES ABSOLUTE: 0.32 E9/L (ref 1.5–4)
LYMPHOCYTES RELATIVE PERCENT: 5.3 % (ref 20–42)
MAGNESIUM: 2.2 MG/DL (ref 1.6–2.6)
MCH RBC QN AUTO: 26 PG (ref 26–35)
MCHC RBC AUTO-ENTMCNC: 29.9 % (ref 32–34.5)
MCV RBC AUTO: 87.1 FL (ref 80–99.9)
MONOCYTES ABSOLUTE: 0.07 E9/L (ref 0.1–0.95)
MONOCYTES RELATIVE PERCENT: 1.2 % (ref 2–12)
NEUTROPHILS ABSOLUTE: 5.62 E9/L (ref 1.8–7.3)
NEUTROPHILS RELATIVE PERCENT: 92.6 % (ref 43–80)
OVALOCYTES: ABNORMAL
PDW BLD-RTO: 16.9 FL (ref 11.5–15)
PLATELET # BLD: 217 E9/L (ref 130–450)
PMV BLD AUTO: 11.9 FL (ref 7–12)
POLYCHROMASIA: ABNORMAL
POTASSIUM REFLEX MAGNESIUM: 4.2 MMOL/L (ref 3.5–5)
POTASSIUM SERPL-SCNC: 4.3 MMOL/L (ref 3.5–5)
PRO-BNP: 7656 PG/ML (ref 0–450)
PROTHROMBIN TIME: 12 SEC (ref 9.3–12.4)
RBC # BLD: 4.65 E12/L (ref 3.5–5.5)
SCHISTOCYTES: ABNORMAL
SODIUM BLD-SCNC: 136 MMOL/L (ref 132–146)
SODIUM BLD-SCNC: 137 MMOL/L (ref 132–146)
TOTAL PROTEIN: 7.4 G/DL (ref 6.4–8.3)
TOTAL PROTEIN: 7.7 G/DL (ref 6.4–8.3)
TROPONIN: <0.01 NG/ML (ref 0–0.03)
TROPONIN: <0.01 NG/ML (ref 0–0.03)
WBC # BLD: 6.1 E9/L (ref 4.5–11.5)

## 2021-02-19 PROCEDURE — 2580000003 HC RX 258: Performed by: STUDENT IN AN ORGANIZED HEALTH CARE EDUCATION/TRAINING PROGRAM

## 2021-02-19 PROCEDURE — 2580000003 HC RX 258: Performed by: PHYSICIAN ASSISTANT

## 2021-02-19 PROCEDURE — 84484 ASSAY OF TROPONIN QUANT: CPT

## 2021-02-19 PROCEDURE — 6360000002 HC RX W HCPCS: Performed by: INTERNAL MEDICINE

## 2021-02-19 PROCEDURE — 36415 COLL VENOUS BLD VENIPUNCTURE: CPT

## 2021-02-19 PROCEDURE — 85610 PROTHROMBIN TIME: CPT

## 2021-02-19 PROCEDURE — 80053 COMPREHEN METABOLIC PANEL: CPT

## 2021-02-19 PROCEDURE — 83880 ASSAY OF NATRIURETIC PEPTIDE: CPT

## 2021-02-19 PROCEDURE — 2580000003 HC RX 258: Performed by: INTERNAL MEDICINE

## 2021-02-19 PROCEDURE — 74174 CTA ABD&PLVS W/CONTRAST: CPT

## 2021-02-19 PROCEDURE — 2060000000 HC ICU INTERMEDIATE R&B

## 2021-02-19 PROCEDURE — 6370000000 HC RX 637 (ALT 250 FOR IP): Performed by: STUDENT IN AN ORGANIZED HEALTH CARE EDUCATION/TRAINING PROGRAM

## 2021-02-19 PROCEDURE — 75572 CT HRT W/3D IMAGE: CPT

## 2021-02-19 PROCEDURE — 93000 ELECTROCARDIOGRAM COMPLETE: CPT | Performed by: INTERNAL MEDICINE

## 2021-02-19 PROCEDURE — 2580000003 HC RX 258: Performed by: RADIOLOGY

## 2021-02-19 PROCEDURE — 99205 OFFICE O/P NEW HI 60 MIN: CPT | Performed by: INTERNAL MEDICINE

## 2021-02-19 PROCEDURE — 85730 THROMBOPLASTIN TIME PARTIAL: CPT

## 2021-02-19 PROCEDURE — 99283 EMERGENCY DEPT VISIT LOW MDM: CPT

## 2021-02-19 PROCEDURE — 93005 ELECTROCARDIOGRAM TRACING: CPT | Performed by: STUDENT IN AN ORGANIZED HEALTH CARE EDUCATION/TRAINING PROGRAM

## 2021-02-19 PROCEDURE — 93308 TTE F-UP OR LMTD: CPT

## 2021-02-19 PROCEDURE — 83735 ASSAY OF MAGNESIUM: CPT

## 2021-02-19 PROCEDURE — 6360000004 HC RX CONTRAST MEDICATION: Performed by: RADIOLOGY

## 2021-02-19 PROCEDURE — 85025 COMPLETE CBC W/AUTO DIFF WBC: CPT

## 2021-02-19 PROCEDURE — 71045 X-RAY EXAM CHEST 1 VIEW: CPT

## 2021-02-19 PROCEDURE — 71275 CT ANGIOGRAPHY CHEST: CPT

## 2021-02-19 PROCEDURE — 6370000000 HC RX 637 (ALT 250 FOR IP): Performed by: INTERNAL MEDICINE

## 2021-02-19 RX ORDER — POLYETHYLENE GLYCOL 3350 17 G/17G
17 POWDER, FOR SOLUTION ORAL DAILY PRN
Status: DISCONTINUED | OUTPATIENT
Start: 2021-02-19 | End: 2021-02-20

## 2021-02-19 RX ORDER — MAGNESIUM SULFATE IN WATER 40 MG/ML
2000 INJECTION, SOLUTION INTRAVENOUS PRN
Status: DISCONTINUED | OUTPATIENT
Start: 2021-02-19 | End: 2021-02-24

## 2021-02-19 RX ORDER — ACETAMINOPHEN 500 MG
1000 TABLET ORAL ONCE
Status: COMPLETED | OUTPATIENT
Start: 2021-02-19 | End: 2021-02-19

## 2021-02-19 RX ORDER — ACETAMINOPHEN 325 MG/1
650 TABLET ORAL EVERY 6 HOURS PRN
Status: DISCONTINUED | OUTPATIENT
Start: 2021-02-19 | End: 2021-03-02 | Stop reason: HOSPADM

## 2021-02-19 RX ORDER — ONDANSETRON 2 MG/ML
4 INJECTION INTRAMUSCULAR; INTRAVENOUS EVERY 6 HOURS PRN
Status: DISCONTINUED | OUTPATIENT
Start: 2021-02-19 | End: 2021-03-02 | Stop reason: HOSPADM

## 2021-02-19 RX ORDER — VENLAFAXINE 75 MG/1
75 TABLET ORAL DAILY
Status: DISCONTINUED | OUTPATIENT
Start: 2021-02-19 | End: 2021-03-02 | Stop reason: HOSPADM

## 2021-02-19 RX ORDER — SODIUM CHLORIDE 0.9 % (FLUSH) 0.9 %
10 SYRINGE (ML) INJECTION ONCE
Status: COMPLETED | OUTPATIENT
Start: 2021-02-19 | End: 2021-02-19

## 2021-02-19 RX ORDER — SODIUM CHLORIDE 0.9 % (FLUSH) 0.9 %
10 SYRINGE (ML) INJECTION EVERY 12 HOURS SCHEDULED
Status: DISCONTINUED | OUTPATIENT
Start: 2021-02-19 | End: 2021-02-22 | Stop reason: SDUPTHER

## 2021-02-19 RX ORDER — ACETAMINOPHEN 500 MG
650 TABLET ORAL EVERY 6 HOURS PRN
COMMUNITY

## 2021-02-19 RX ORDER — POTASSIUM CHLORIDE 20 MEQ/1
40 TABLET, EXTENDED RELEASE ORAL PRN
Status: DISCONTINUED | OUTPATIENT
Start: 2021-02-19 | End: 2021-03-02 | Stop reason: HOSPADM

## 2021-02-19 RX ORDER — SODIUM CHLORIDE 0.9 % (FLUSH) 0.9 %
10 SYRINGE (ML) INJECTION PRN
Status: DISCONTINUED | OUTPATIENT
Start: 2021-02-19 | End: 2021-02-22 | Stop reason: SDUPTHER

## 2021-02-19 RX ORDER — PREDNISONE 50 MG/1
50 TABLET ORAL DAILY
COMMUNITY

## 2021-02-19 RX ORDER — MECLIZINE HCL 12.5 MG/1
12.5 TABLET ORAL 3 TIMES DAILY PRN
Status: DISCONTINUED | OUTPATIENT
Start: 2021-02-19 | End: 2021-03-02 | Stop reason: HOSPADM

## 2021-02-19 RX ORDER — 0.9 % SODIUM CHLORIDE 0.9 %
500 INTRAVENOUS SOLUTION INTRAVENOUS ONCE
Status: COMPLETED | OUTPATIENT
Start: 2021-02-19 | End: 2021-02-19

## 2021-02-19 RX ORDER — PROMETHAZINE HYDROCHLORIDE 25 MG/1
12.5 TABLET ORAL EVERY 6 HOURS PRN
Status: DISCONTINUED | OUTPATIENT
Start: 2021-02-19 | End: 2021-03-02 | Stop reason: HOSPADM

## 2021-02-19 RX ORDER — ACETAMINOPHEN 650 MG/1
650 SUPPOSITORY RECTAL EVERY 6 HOURS PRN
Status: DISCONTINUED | OUTPATIENT
Start: 2021-02-19 | End: 2021-03-02 | Stop reason: HOSPADM

## 2021-02-19 RX ORDER — SODIUM CHLORIDE 9 MG/ML
INJECTION, SOLUTION INTRAVENOUS CONTINUOUS
Status: DISCONTINUED | OUTPATIENT
Start: 2021-02-19 | End: 2021-02-27

## 2021-02-19 RX ORDER — POTASSIUM CHLORIDE 7.45 MG/ML
10 INJECTION INTRAVENOUS PRN
Status: DISCONTINUED | OUTPATIENT
Start: 2021-02-19 | End: 2021-03-02 | Stop reason: HOSPADM

## 2021-02-19 RX ADMIN — SODIUM CHLORIDE: 9 INJECTION, SOLUTION INTRAVENOUS at 18:47

## 2021-02-19 RX ADMIN — ENOXAPARIN SODIUM 40 MG: 40 INJECTION SUBCUTANEOUS at 22:30

## 2021-02-19 RX ADMIN — Medication 10 ML: at 15:05

## 2021-02-19 RX ADMIN — VENLAFAXINE 75 MG: 75 TABLET ORAL at 22:30

## 2021-02-19 RX ADMIN — IOPAMIDOL 100 ML: 755 INJECTION, SOLUTION INTRAVENOUS at 15:05

## 2021-02-19 RX ADMIN — Medication 10 ML: at 22:30

## 2021-02-19 RX ADMIN — ACETAMINOPHEN 1000 MG: 500 TABLET ORAL at 18:47

## 2021-02-19 RX ADMIN — SODIUM CHLORIDE 500 ML: 9 INJECTION, SOLUTION INTRAVENOUS at 17:07

## 2021-02-19 ASSESSMENT — PAIN SCALES - GENERAL
PAINLEVEL_OUTOF10: 0
PAINLEVEL_OUTOF10: 5

## 2021-02-19 NOTE — PROGRESS NOTES
[address] [facility]   Heart and Vascular [place]   Clinic Note     Date:2/19/21   Patient Glenroy Coffey  YOB: 1935  Age: 82 y.o. Primary Care Provider: DO Jules Bryson     This is a pleasant 70-year-old  female who is accompanied by her son. She is admitted to the hospital late last month with presyncope was noted to have a large pericardial effusion, complicated by tamponade physiology status post pericardiocentesis as noted below. She has since been discharged to a nursing home and today she reports lightheadedness again and dyspnea with walking just a few steps. She has no chest pain or syncope or palpitations. She has orthopnea but no PND. She has no lower extremity edema. She reports an intentional weight loss of about 20 pounds over the past few months       A focused history review includes:  1. Large pericardial effusion status post pericardiocentesis of approximately 1400 mL of straw-colored fluid (Dr. Aleida Nicole, January 25, 2021)  a. Cytology \"suspicious for adenocarcinoma\"  b. There were small bilateral pleural effusions (left greater than right) with then area of atelectasis versus infiltrate in the left lower lobe and adjacent lingula  2. Hypertension  3. Hypercholesterolemia  4. Osteoarthritis  5.  Former smoker of 80 pack years    Past History    Past Medical History:         Diagnosis Date    Accidental drug overdose 5/21/2014    Acute delirium 12/26/2014    Anxiety     Arthritis     Dizziness, nonspecific     Generalized headaches     Heart murmur     stable per pt    Hypertension     Knee pain     right    Malnutrition ([address] 12/26/2014    Migraine headache     Polypharmacy 12/26/2014    Reflux gastritis     Rhabdomyolysis 5/21/2014    Sinus problem        Past Surgical History:  Past Surgical History:   Procedure Laterality Date    APPENDECTOMY      BACK SURGERY  2007    lumbar    CHOLECYSTECTOMY      30 tablet 0    amLODIPine (NORVASC) 10 MG tablet Take 1 tablet by mouth daily. 30 tablet 0    venlafaxine (EFFEXOR) 37.5 MG tablet Take 75 mg by mouth daily        No current facility-administered medications for this visit. Physical Examination      /60   Pulse 77   Resp 18   Ht 5' 8\" (1.727 m)   Wt 140 lb (63.5 kg)   SpO2 95%   BMI 21.29 kg/m²     General: No acute distress, appears as stated age, nonicteric  Head: Atraumatic, no gross abnormalities or bruises  Neck: Supple and nontender, no carotid bruits, elevated JVP. Carotid pulses bilaterally are delayed and weak. Lungs: Clear to auscultation bilaterally, no wheezes, rales, or rhonchi  Heart: Regular rate and rhythm. 3/6 late peaking harsh systolic ejection murmur over the upper right sternal border. S2 could not be heard. Abdomen: Soft, nontender, nondistended, normal bowel sounds  Extremities: No obvious deformities, no cyanosis, no edema  Neurological: Alert and oriented x3, EOMI, moving all extremities x4  Psychological: Normal mood and affect, cooperative  Skin: Color, texture, and turgor normal for age          Labs/Imaging/Diagnostics   Personally reviewed:    Lab Results   Component Value Date     02/10/2021    K 4.0 02/10/2021    K 3.9 01/24/2021     02/10/2021    CO2 27 02/10/2021    BUN 26 02/10/2021    CREATININE 0.9 02/10/2021    GLUCOSE 88 02/10/2021    GLUCOSE 90 05/16/2012    CALCIUM 9.2 02/10/2021        Estimated Creatinine Clearance: 47 mL/min (based on SCr of 0.9 mg/dL).     Lab Results   Component Value Date    WBC 6.1 02/10/2021    HGB 11.4 (L) 02/10/2021    HCT 36.2 02/10/2021    MCV 85.8 02/10/2021     02/10/2021       Lab Results   Component Value Date    ALT 26 02/27/2019    AST 37 (H) 02/27/2019    ALKPHOS 115 (H) 02/27/2019    BILITOT 0.5 02/27/2019       Lab Results   Component Value Date    LABALBU 4.5 02/27/2019       Lab Results   Component Value Date    CHOL 216 (H) 02/27/2019 CHOL 244 (H) 07/19/2016    CHOL 241 (H) 02/22/2014     Lab Results   Component Value Date    TRIG 63 02/27/2019    TRIG 66 07/19/2016    TRIG 53 02/22/2014     Lab Results   Component Value Date    HDL 63 02/27/2019    HDL 87 07/19/2016    HDL 92 02/22/2014     Lab Results   Component Value Date    LDLCALC 140 (H) 02/27/2019    LDLCALC 144 (H) 07/19/2016    LDLCALC 138 (H) 02/22/2014     Lab Results   Component Value Date    LABVLDL 13 02/27/2019    LABVLDL 13 07/19/2016     No results found for: [facility]    Lab Results   Component Value Date    CKTOTAL 101 12/25/2014    CKMB 14.1 (H) 05/21/2014    TROPONINI <0.01 01/24/2021       Personally interpreted the following studies. :  Personally reviewed her EKG dated 1/24/2021:  Normal sinus rhythm with diffuse low voltage   Narrow QRS   Nonspecific ST-T abnormalities    Personally reviewed her TTE dated 1/27/2021:  Normal LV size with moderate LVH and hyperdynamic systolic function  Mildly enlarged RV with mild to moderate systolic dysfunction  Mild MR   Moderate TR with normal estimated PASP  Critical aortic stenosis with mean gradient of 100 with mild AR. Estimated valve area 0.86 with an LVOT diameter of 2.2. Assessment and Plan:        61-year-old  female with symptomatic critical aortic stenosis identified incidentally when she was admitted for a large pericardial effusion complicated by tamponade. Of note cytology was suspicious for adenocarcinoma and there was an infiltrate in the left lung lower lobe with an adjacent effusion. Untreated symptomatic critical aortic stenosis of such severity has a high mortality at least 50% over 1 to 2 years. Even if she is to be diagnosed with cancer, she will not be able to tolerate any therapy with her current degree of aortic stenosis.   As such she is to be worked up expeditiously for TAVR (preferred over SAVR given her age) with a coronary angiogram (given her significant coronary calcification on chest CT) and TAVR protocol CTAs (which should screen for any abdominal or pelvic malignancy and reexamine the left lung lower lobe). If it turns out that she has metastatic cancer with a life expectancy of less than 1 year based on imaging then we may consider a palliative balloon aortic valvuloplasty although with her degree of aortic stenosis it is unlikely to be adequately successful in my opinion. Diagnosis Orders   1. Critical aortic valve stenosis  EKG 12 lead   2. Essential hypertension     3. Pericardial effusion     4. Former smoker     5. Postural dizziness with presyncope         TAVR protocol CTAs to be done today.  She will be going to the emergency room today, taken by her son, given her recurring symptoms of presyncope which raises suspicion for recurring significant pericardial effusion  o Stat TTE for pericardial effusion  o 500 mL NS over 1 hour  o Hold antihypertensives  o Communicated to the emergency room at [facility] Coronary angiogram to be done on Monday, February 22. Tentative plan to proceed with TAVR no later than Wednesday, February 24 with preference given to MAC (given RV dysfunction)     Assessment and plan revieweed with the patient/son and their questions and concerns answered in full. The case was also discussed with Dr. Oumar Hutchinson of cardiothoracic surgery who is familiar with the patient as noted above.  Follow up with me in 1 month at the structural heart clinic    We appreciate the opportunity to participate in Her care!       Jenifer London MD, [facility]  Interventional Cardiology/Structural Heart Disease  Office: [phone]  Fax: [phone]  JOSÉ Coordinator: Niecy Charles

## 2021-02-19 NOTE — ED NOTES
Bed: 10  Expected date:   Expected time:   Means of arrival:   Comments:  cardio     Kassi Dallas, RN  02/19/21 1471

## 2021-02-19 NOTE — H&P
Hospital Medicine History & Physical      PCP: Jagruti Echols DO    Date of Admission: 2/19/2021    Date of Service: Pt seen/examined on 2/19/2021 and Admitted to Inpatient with expected LOS greater than two midnights due to medical therapy. Chief Complaint: Lightheadedness and shortness of breath    History Of Present Illness:      82 y.o. female history of dementia, GERD, hypertension, critical aortic stenosis who was sent from Dr. Pat Collazo clinic for evaluation of presyncope symptoms. The patient had recent admission in January 2021 for presyncope. He was noted to have large pericardial effusion with tamponade physiology. She had emergent pericardiocentesis with 1.4 liters removed by CT surgery. Cytology suspicious for adenocarcinoma. She was also noted to have critical AS. Work-up for TAVR was to be started. She was seen in cardiology clinic today for follow-up. Patient states she has dyspnea on ambulation. She has intermittent lightheadedness on standing. She has dementia. Some of the history is collaborated from her son Sabrina Diaz. In ER, BP was 108/60 on arrival.  Lab work is pertinent for BNP 7256, troponin <0.01. CTA chest/abdomen/pelvis pending. Emergent echocardiogram shows moderate circumferential pericardial effusion with no tamponade physiology.     Past Medical History:          Diagnosis Date    Accidental drug overdose 5/21/2014    Acute delirium 12/26/2014    Anxiety     Arthritis     Dizziness, nonspecific     Generalized headaches     Heart murmur     stable per pt    Hypertension     Knee pain     right    Malnutrition ([address] 12/26/2014    Migraine headache     Polypharmacy 12/26/2014    Reflux gastritis     Rhabdomyolysis 5/21/2014    Sinus problem        Past Surgical History:          Procedure Laterality Date    APPENDECTOMY      BACK SURGERY  2007    lumbar    CHOLECYSTECTOMY      HYSTERECTOMY      PERICARDIUM SURGERY N/A 1/25/2021    PERICARDIOCENTESIS performed by Jennie Campbell MD at [address] [place] [address]  9/17/2012    right       Medications Prior to Admission:      Prior to Admission medications    Medication Sig Start Date End Date Taking? Authorizing Provider   diphenhydrAMINE HCl (BENADRYL ALLERGY PO) Take by mouth    Historical Provider, MD   predniSONE (DELTASONE) 50 MG tablet Take 50 mg by mouth daily    Historical Provider, MD   acetaminophen (TYLENOL) 500 MG tablet Take 650 mg by mouth every 6 hours as needed for Pain    Historical Provider, MD   bisacodyl (DULCOLAX) 10 MG suppository Place 1 suppository rectally daily as needed for Constipation 1/29/21 2/28/21  Travis Moreno DO   sennosides-docusate sodium (SENOKOT-S) 8.6-50 MG tablet Take 1 tablet by mouth 2 times daily 1/29/21   Travis Moreno DO   Cholecalciferol (VITAMIN D3) 1.25 MG (68245 UT) CAPS Take by mouth once a week    Historical Provider, MD   aspirin 325 MG EC tablet Take 1 tablet by mouth daily. 5/23/14   Marin Paul MD   venlafaxine (EFFEXOR) 37.5 MG tablet Take 75 mg by mouth daily     Historical Provider, MD       Allergies: Other    Social History:      The patient currently lives at home. She is currently at [facility]. TOBACCO:   reports that she quit smoking about 13 years ago. She quit after 45.00 years of use. She has never used smokeless tobacco.  ETOH:   reports no history of alcohol use. E-Cigarettes/Vaping Use     Questions Responses    E-Cigarette/Vaping Use     Start Date     Passive Exposure     Quit Date     Counseling Given     Comments             Family History:       Reviewed in detail and negative for DM, CAD, Cancer, CVA. Positive as follows:    History reviewed. No pertinent family history. REVIEW OF SYSTEMS:   Pertinent positives as noted in the HPI. All other systems reviewed and negative.     PHYSICAL EXAM PERFORMED:    /79   Pulse 79   Temp 97.4 °F (36.3 °C)   Resp 17   Ht 5' 8\" (1.727 m)   Wt 140 lb (63.5 kg)   SpO2 98% BMI 21.29 kg/m²     General appearance:  No apparent distress, appears stated age and cooperative. Elderly female. HEENT:  Normal cephalic, atraumatic without obvious deformity. Pupils equal, round, and reactive to light. Extra ocular muscles intact. Conjunctivae/corneas clear. Neck: Supple, with full range of motion. No jugular venous distention. Trachea midline. Respiratory:  Normal respiratory effort. Diminished sounds at bases. Cardiovascular:  Regular rate and rhythm with normal S1/S2 ,+ murmurs, rubs or gallops. Abdomen: Soft, non-tender, non-distended with normal bowel sounds. Musculoskeletal:  No clubbing, cyanosis or edema bilaterally. Full range of motion without deformity. Skin: Skin color, texture, turgor normal.  No rashes or lesions. Neurologic:  Neurovascularly intact without any focal sensory/motor deficits. Cranial nerves: II-XII intact, grossly non-focal.  Psychiatric:  Alert and oriented  X 2      Labs:     No results for input(s): WBC, HGB, HCT, PLT in the last 72 hours. No results for input(s): NA, K, CL, CO2, BUN, CREATININE, CALCIUM, PHOS in the last 72 hours. Invalid input(s): MAGNES  No results for input(s): AST, ALT, BILIDIR, BILITOT, ALKPHOS in the last 72 hours. No results for input(s): INR in the last 72 hours. No results for input(s): [place] Jaspreet in the last 72 hours. Urinalysis:      Lab Results   Component Value Date    NITRU Negative 01/24/2021    WBCUA 0-1 01/24/2021    WBCUA 0-1 06/24/2011    BACTERIA NONE SEEN 01/24/2021    RBCUA 0-1 01/24/2021    RBCUA NONE 06/24/2011    BLOODU SMALL 01/24/2021    SPECGRAV <=1.005 01/24/2021    GLUCOSEU Negative 01/24/2021    GLUCOSEU 100 06/24/2011       Radiology:     EKG: None done    XR CHEST PORTABLE    (Results Pending)       ASSESSMENT:    There are no active hospital problems to display for this patient. Presyncope-likely related to severe AS and possibly vertigo  -She has known critical AS.   No tamponade noted. Cardiology will be evaluating for TAVR. -Orthostatics  -Fall precautions  -Telemetry  -PT/OT   -As needed meclizine    Adenocarcinoma  -Await CT chest/Abdo/pelvis for evaluation for malignancy  -Oncology consultation    Large pericardial effusion with tamponade s/p pericardiocentesis 1/25/21  -Repeat echocardiogram shows moderate pericardial effusion with no tamponade physiology  -As per cardiology    Critical aortic stenosis  -TAVR vs valvuloplasty depending malignancy staging.  -Await cardiology recommendations    Dementia  -Not on any medication.  -Delirium precautions    DVT Prophylaxis: Lovenox  Diet: No diet orders on file  Code Status: Prior    PT/OT Eval Status: Ordered    Dispo -disposition pending work-up       Zacarias Strickland MD    Thank you Raad Forte DO for the opportunity to be involved in this patient's care. If you have any questions or concerns please feel free to contact me at [phone].

## 2021-02-19 NOTE — ACP (ADVANCE CARE PLANNING)
ADVANCED CARE PLANNING    Patient Name: Sarah Barron       YOB: 1935              MRN:    [id number]  Admission Date:  2/19/2021  3:47 PM      Active Diagnoses: Active Problems:    Critical aortic valve stenosis  Resolved Problems:    * No resolved hospital problems. *      These active diagnoses are of sufficient risk that focused discussion on advanced care planning is indicated in order to allow the patient to thoughtfully consider personal goals of care; and, if situations arise that prevent the patient to personally give input, to ensure appropriate representation of their personal desire for different levels and levels of care. Person(s) present in discussion: Sarah Barron, Milla Goodman MD, Family members: Anne Lin. 61-year-old female history of hypertension, depression who was sent from her cardiologist clinic for evaluation of presyncope. She had recent admission for cardiac tamponade requiring pericardiocentesis. Cytology showed adenocarcinoma. She has been worked up for TAVR due to critical aortic stenosis. Staging of malignancy is critical to candidacy for TAVR. She has dementia and son is POA. Discussion: I reviewed her admission for the above diagnoses and her desires for ongoing aggresive care, including potential intubation and mechanical ventilation; also discussed who would speak on her behalf should she be unable to do so and discussed what conversations she has had with her family so they understand her desires if such a situation occurred now or in the future. PLAN:    Patient states she would want everything done. Discussed with her son who for me that CPR/ACLS is fine but he would not want his mother intubated. Code Status: At this time patient wishes to be Prior    Surrogate decision maker:Cayden Soto.   Phone number:[phone]      Time Spent on Advanced Planning Documents: 18 minutes    [address]

## 2021-02-19 NOTE — CARE COORDINATION
MIHAELA transition of care. Pt presented to [facility] ER secondary to chest pain. Pt recently d/c from this facility on 1/29. Pt sent to ER from doctor office. Pt is from [facility] and per Sharen Carrel pt is a private courteous hold. Assigned CM/SW to follow for final d/c plan.   Drea Perez RN CM

## 2021-02-19 NOTE — ED PROVIDER NOTES
---------------------------------------------  Past Medical History:  has a past medical history of Accidental drug overdose, Acute delirium, Anxiety, Arthritis, Dizziness, nonspecific, Generalized headaches, Heart murmur, Hypertension, Knee pain, Malnutrition ([address] Migraine headache, Polypharmacy, Reflux gastritis, Rhabdomyolysis, and Sinus problem. Past Surgical History:  has a past surgical history that includes Appendectomy; Cholecystectomy; Hysterectomy; back surgery (2007); Total knee arthroplasty (9/17/2012); and Pericardium surgery (N/A, 1/25/2021). Social History:  reports that she quit smoking about 13 years ago. She quit after 45.00 years of use. She has never used smokeless tobacco. She reports that she does not drink alcohol or use drugs. Family History: family history is not on file. The patients home medications have been reviewed. Allergies:  Other    -------------------------------------------------- RESULTS -------------------------------------------------  All laboratory and radiology results have been personally reviewed by myself   LABS:  Results for orders placed or performed during the hospital encounter of 02/19/21   CBC auto differential   Result Value Ref Range    WBC 6.1 4.5 - 11.5 E9/L    RBC 4.65 3.50 - 5.50 E12/L    Hemoglobin 12.1 11.5 - 15.5 g/dL    Hematocrit 40.5 34.0 - 48.0 %    MCV 87.1 80.0 - 99.9 fL    MCH 26.0 26.0 - 35.0 pg    MCHC 29.9 (L) 32.0 - 34.5 %    RDW 16.9 (H) 11.5 - 15.0 fL    Platelets 855 988 - 669 E9/L    MPV 11.9 7.0 - 12.0 fL   Comprehensive Metabolic Panel   Result Value Ref Range    Sodium 136 132 - 146 mmol/L    Potassium 4.3 3.5 - 5.0 mmol/L    Chloride 100 98 - 107 mmol/L    CO2 24 22 - 29 mmol/L    Anion Gap 12 7 - 16 mmol/L    Glucose 128 (H) 74 - 99 mg/dL    BUN 27 (H) 8 - 23 mg/dL    CREATININE 0.9 0.5 - 1.0 mg/dL    GFR Non-African American 60 >=60 mL/min/1.73    GFR African American >60     Calcium 9.5 8.6 - 10.2 mg/dL    Total Protein 7.7 6.4 - 8.3 g/dL    Albumin 4.0 3.5 - 5.2 g/dL    Total Bilirubin 0.4 0.0 - 1.2 mg/dL    Alkaline Phosphatase 73 35 - 104 U/L    ALT 7 0 - 32 U/L    AST 13 0 - 31 U/L   Magnesium   Result Value Ref Range    Magnesium 2.2 1.6 - 2.6 mg/dL   Troponin   Result Value Ref Range    Troponin <0.01 0.00 - 0.03 ng/mL   Brain Natriuretic Peptide   Result Value Ref Range    Pro-BNP 7,656 (H) 0 - 450 pg/mL   Protime-INR   Result Value Ref Range    Protime 12.0 9.3 - 12.4 sec    INR 1.1    APTT   Result Value Ref Range    aPTT 28.6 24.5 - 35.1 sec       RADIOLOGY:  Interpreted by Radiologist.  XR CHEST PORTABLE   Final Result   Enlargement of the cardiac silhouette and left pleural effusion are overall   similar to the previous chest x-ray from 01/25/2021, status post interval   removal of the left chest tube.          ------------------------- NURSING NOTES AND VITALS REVIEWED ---------------------------   The nursing notes within the ED encounter and vital signs as below have been reviewed. /79   Pulse 79   Temp 97.4 °F (36.3 °C)   Resp 17   Ht 5' 8\" (1.727 m)   Wt 140 lb (63.5 kg)   SpO2 98%   BMI 21.29 kg/m²   Oxygen Saturation Interpretation: Normal      ---------------------------------------------------PHYSICAL EXAM--------------------------------------      Constitutional/General: Alert and oriented x3, well appearing, non toxic in NAD  Head: Normocephalic and atraumatic  Eyes: PERRL, EOMI  Mouth: Oropharynx clear, handling secretions, no trismus  Neck: Supple, full ROM,   Pulmonary: Lungs clear to auscultation bilaterally, no wheezes, rales, or rhonchi. Not in respiratory distress  Cardiovascular:  Regular rate and rhythm, grade 6 harsh systolic murmur consistent with aortic stenosis  Abdomen: Soft, non tender, non distended,   Extremities: Moves all extremities x 4.  Warm and well perfused  Skin: warm and dry without rash  Neurologic: GCS 15, no focal defecit  Psych: Normal Affect        EKG: This EKG is signed and interpreted by me. Rate: 75  Rhythm: Sinus  Interpretation: Normal sinus rhythm, no acute changes noted, no STEMI, no axis deviation, T wave inversion diffusely through precordial leads,  ms  Comparison: stable as compared to patient's most recent EKG    ------------------------------ ED COURSE/MEDICAL DECISION MAKING----------------------  Medications   perflutren lipid microspheres (DEFINITY) injection 1.65 mg (has no administration in time range)   0.9 % sodium chloride bolus (500 mLs Intravenous New Bag 2/19/21 1707)   0.9 % sodium chloride infusion (has no administration in time range)         ED COURSE:  ED Course as of Feb 19 1729 Fri Feb 19, 2021   1703 Spoke to Dr. Vanna Purdy who would like patient admitted, started on a 500cc fluid bolus followed by 75cc/hr maintenance. He states that if patient decompensates he soul like called for a pericardial window. [DS]      ED Course User Index  [DS] Max Session, DO       Medical Decision Making:    Patient presents the ER today with chief complaint of near syncopal event. Patient has a severe case of aortic stenosis and moderate pericardial effusion. She does follow with Dr. Vanna Purdy with whom I spoke and he would like to bring her in. He states that he is going to do a TAVR on her likely on Monday. Patient agreeable to admission at this time. I did speak to Dr. Fariha Parisi who is agreeable to admission as well. All questions have been answered. Counseling: The emergency provider has spoken with the patient and discussed todays results, in addition to providing specific details for the plan of care and counseling regarding the diagnosis and prognosis. Questions are answered at this time and they are agreeable with the plan.      --------------------------------- IMPRESSION AND DISPOSITION ---------------------------------    IMPRESSION  1. Critical aortic valve stenosis    2.  Pericardial effusion        New Prescriptions    No medications on file       DISPOSITION  Disposition: Admit to telemetry  Patient condition is fair      NOTE: This report was transcribed using voice recognition software.  Every effort was made to ensure accuracy; however, inadvertent computerized transcription errors may be present       Gagan Burnett DO  Resident  02/19/21 [id number]

## 2021-02-20 LAB
ALBUMIN SERPL-MCNC: 3.8 G/DL (ref 3.5–5.2)
ALP BLD-CCNC: 65 U/L (ref 35–104)
ALT SERPL-CCNC: 8 U/L (ref 0–32)
ANION GAP SERPL CALCULATED.3IONS-SCNC: 8 MMOL/L (ref 7–16)
AST SERPL-CCNC: 13 U/L (ref 0–31)
BILIRUB SERPL-MCNC: 0.3 MG/DL (ref 0–1.2)
BUN BLDV-MCNC: 27 MG/DL (ref 8–23)
CALCIUM SERPL-MCNC: 9.2 MG/DL (ref 8.6–10.2)
CHLORIDE BLD-SCNC: 103 MMOL/L (ref 98–107)
CO2: 28 MMOL/L (ref 22–29)
CREAT SERPL-MCNC: 0.9 MG/DL (ref 0.5–1)
EKG ATRIAL RATE: 75 BPM
EKG P AXIS: 43 DEGREES
EKG P-R INTERVAL: 148 MS
EKG Q-T INTERVAL: 428 MS
EKG QRS DURATION: 74 MS
EKG QTC CALCULATION (BAZETT): 477 MS
EKG R AXIS: -25 DEGREES
EKG T AXIS: 19 DEGREES
EKG VENTRICULAR RATE: 75 BPM
GFR AFRICAN AMERICAN: >60
GFR NON-AFRICAN AMERICAN: 60 ML/MIN/1.73
GLUCOSE BLD-MCNC: 133 MG/DL (ref 74–99)
HCT VFR BLD CALC: 34.7 % (ref 34–48)
HEMOGLOBIN: 10.9 G/DL (ref 11.5–15.5)
MCH RBC QN AUTO: 27.3 PG (ref 26–35)
MCHC RBC AUTO-ENTMCNC: 31.4 % (ref 32–34.5)
MCV RBC AUTO: 87 FL (ref 80–99.9)
PDW BLD-RTO: 16.9 FL (ref 11.5–15)
PLATELET # BLD: 191 E9/L (ref 130–450)
PMV BLD AUTO: 11.7 FL (ref 7–12)
POTASSIUM REFLEX MAGNESIUM: 3.8 MMOL/L (ref 3.5–5)
RBC # BLD: 3.99 E12/L (ref 3.5–5.5)
SODIUM BLD-SCNC: 139 MMOL/L (ref 132–146)
TOTAL PROTEIN: 6.7 G/DL (ref 6.4–8.3)
TROPONIN: <0.01 NG/ML (ref 0–0.03)
WBC # BLD: 9.5 E9/L (ref 4.5–11.5)

## 2021-02-20 PROCEDURE — 6360000002 HC RX W HCPCS: Performed by: INTERNAL MEDICINE

## 2021-02-20 PROCEDURE — 84484 ASSAY OF TROPONIN QUANT: CPT

## 2021-02-20 PROCEDURE — 93010 ELECTROCARDIOGRAM REPORT: CPT | Performed by: INTERNAL MEDICINE

## 2021-02-20 PROCEDURE — 6370000000 HC RX 637 (ALT 250 FOR IP): Performed by: INTERNAL MEDICINE

## 2021-02-20 PROCEDURE — 2580000003 HC RX 258: Performed by: INTERNAL MEDICINE

## 2021-02-20 PROCEDURE — 85027 COMPLETE CBC AUTOMATED: CPT

## 2021-02-20 PROCEDURE — 2060000000 HC ICU INTERMEDIATE R&B

## 2021-02-20 PROCEDURE — 99223 1ST HOSP IP/OBS HIGH 75: CPT | Performed by: INTERNAL MEDICINE

## 2021-02-20 PROCEDURE — 2580000003 HC RX 258: Performed by: STUDENT IN AN ORGANIZED HEALTH CARE EDUCATION/TRAINING PROGRAM

## 2021-02-20 PROCEDURE — APPSS60 APP SPLIT SHARED TIME 46-60 MINUTES: Performed by: PHYSICIAN ASSISTANT

## 2021-02-20 PROCEDURE — 80053 COMPREHEN METABOLIC PANEL: CPT

## 2021-02-20 PROCEDURE — 36415 COLL VENOUS BLD VENIPUNCTURE: CPT

## 2021-02-20 PROCEDURE — 99222 1ST HOSP IP/OBS MODERATE 55: CPT | Performed by: TRANSPLANT SURGERY

## 2021-02-20 RX ORDER — DOCUSATE SODIUM 100 MG/1
100 CAPSULE, LIQUID FILLED ORAL 2 TIMES DAILY
Status: DISCONTINUED | OUTPATIENT
Start: 2021-02-20 | End: 2021-03-02 | Stop reason: HOSPADM

## 2021-02-20 RX ORDER — POLYETHYLENE GLYCOL 3350 17 G/17G
17 POWDER, FOR SOLUTION ORAL DAILY
Status: DISCONTINUED | OUTPATIENT
Start: 2021-02-20 | End: 2021-03-02 | Stop reason: HOSPADM

## 2021-02-20 RX ADMIN — ACETAMINOPHEN 650 MG: 325 TABLET ORAL at 20:10

## 2021-02-20 RX ADMIN — VENLAFAXINE 75 MG: 75 TABLET ORAL at 08:35

## 2021-02-20 RX ADMIN — ACETAMINOPHEN 650 MG: 325 TABLET ORAL at 14:07

## 2021-02-20 RX ADMIN — POLYETHYLENE GLYCOL 3350 17 G: 17 POWDER, FOR SOLUTION ORAL at 14:07

## 2021-02-20 RX ADMIN — SODIUM CHLORIDE: 9 INJECTION, SOLUTION INTRAVENOUS at 05:42

## 2021-02-20 RX ADMIN — DOCUSATE SODIUM 100 MG: 100 CAPSULE, LIQUID FILLED ORAL at 14:07

## 2021-02-20 RX ADMIN — ACETAMINOPHEN 650 MG: 325 TABLET ORAL at 05:36

## 2021-02-20 RX ADMIN — Medication 10 ML: at 08:44

## 2021-02-20 RX ADMIN — DOCUSATE SODIUM 100 MG: 100 CAPSULE, LIQUID FILLED ORAL at 20:10

## 2021-02-20 RX ADMIN — MECLIZINE 12.5 MG: 12.5 TABLET ORAL at 21:35

## 2021-02-20 RX ADMIN — Medication 10 ML: at 20:10

## 2021-02-20 RX ADMIN — MECLIZINE 12.5 MG: 12.5 TABLET ORAL at 14:07

## 2021-02-20 ASSESSMENT — PAIN SCALES - GENERAL: PAINLEVEL_OUTOF10: 6

## 2021-02-20 NOTE — CONSULTS
Inpatient Cardiology Consultation      Reason for Consult: Severe aortic stenosis    Consulting Physician: Dr. Sven Packer    Requesting Physician: Dr. Gamino Courser    Date of Consultation: 2/20/2021    HISTORY OF PRESENT ILLNESS:   Patient is a 82year-old WF who follows with Dr. Pamlea Lopez. She is being seen in consultation this hospital admission by Dr. Sven Packer for evaluation and recommendations regarding severe aortic stenosis. Patient has a known past medical history of large pericardial effusion status-post pericardiocentesis January 2021 with cytology \"suspicious for adenocarcinoma\", history of bilateral pleural effusions, hypertension, hyperlipidemia, vitamin D deficiency, osteoarthritis, and former tobacco abuse. Patient was seen in the office yesterday by Dr. Pamela Lopez on February 19, 2021 and was sent to the ER for hospital admission for further evaluation and management of her critical aortic stenosis given complaints of dyspnea on exertion and dizziness. On interview today, patient is alert and oriented to person, place and time. However, she seems confused at times during interview today. She believes that she presented to Dr. Sina Galindo office from her home, and states she lives by herself, with her son checking up on her regularly. However, it seems per chart review that the patient was residing in a nursing home facility prior to office presentation. On interview this morning, patient states since her last hospital discharge, she has been noticing episodes of dizziness and near syncope. These do not occur everyday, but she does notice them multiple times per week. She denies any actual syncope. She additionally notes of dyspnea with mild exertion, as well as orthopnea. Additionally, patient notes of fatigue with exertion. She denies any chest pain at rest or on exertion to me. Denies nausea, emesis, diaphoresis, palpitations, peripheral edema.   She denies subjective fever, chills, cough or any known recent sick contacts. She denies any pertinent cardiac family medical history to me at this time. She states she is a former tobacco smoker. States she only smoked for approximately five years in her 25s. At that time, she was smoking less than one pack/day. She denies former or current alcohol or illicit drug use. Labs and diagnostic testing as noted below. Please note: past medical records were reviewed per electronic medical record (EMR) - see detailed reports under Past Medical/ Surgical History. PAST MEDICAL HISTORY:    1. Large pericardial effusion status-post pericardiocentesis of approximately 1400 mL of straw-colored fluid (Dr. Leonel Max, January 25, 2021). a. Cytology \"suspicious for adenocarcinoma\". b. There were small bilateral pleural effusions (left greater than right) with then area of atelectasis versus infiltrate in the left lower lobe and adjacent lingula. 2. Hypertension. 3. Hypercholesterolemia, not on statin therapy. 4. Vitamin D deficiency. 5. Osteoarthritis  6. Former tobacco smoker -- states she smoked less than one ppd for approximately five years in her 25s. PAST SURGICAL HISTORY:    Past Surgical History:   Procedure Laterality Date    APPENDECTOMY      BACK SURGERY  2007    lumbar    CHOLECYSTECTOMY      HYSTERECTOMY      PERICARDIUM SURGERY N/A 1/25/2021    PERICARDIOCENTESIS performed by Ilia Mcknight MD at [address] [place] [address]  9/17/2012    right       HOME MEDICATIONS:  Prior to Admission medications    Medication Sig Start Date End Date Taking?  Authorizing Provider   diphenhydrAMINE HCl (BENADRYL ALLERGY PO) Take by mouth   Yes Historical Provider, MD   predniSONE (DELTASONE) 50 MG tablet Take 50 mg by mouth daily   Yes Historical Provider, MD   acetaminophen (TYLENOL) 500 MG tablet Take 650 mg by mouth every 6 hours as needed for Pain   Yes Historical Provider, MD   bisacodyl (DULCOLAX) 10 MG suppository Place 1 suppository rectally daily as needed for Constipation 1/29/21 2/28/21 Yes Travis Moreno, DO   sennosides-docusate sodium (SENOKOT-S) 8.6-50 MG tablet Take 1 tablet by mouth 2 times daily 1/29/21  Yes Travis Moreno DO   Cholecalciferol (VITAMIN D3) 1.25 MG (27364 UT) CAPS Take by mouth once a week   Yes Historical Provider, MD   venlafaxine (EFFEXOR) 37.5 MG tablet Take 75 mg by mouth daily    Yes Historical Provider, MD   aspirin 325 MG EC tablet Take 1 tablet by mouth daily.  5/23/14   Nisha Pickard MD       CURRENT MEDICATIONS:      Current Facility-Administered Medications:     perflutren lipid microspheres (DEFINITY) injection 1.65 mg, 1.5 mL, Intravenous, ONCE PRN, GEOVANNY Ramachandran    venlafaxine [facility]) tablet 75 mg, 75 mg, Oral, Daily, Kameron Boone MD, 75 mg at 02/19/21 2230    sodium chloride flush 0.9 % injection 10 mL, 10 mL, Intravenous, 2 times per day, Kameron Boone MD, 10 mL at 02/19/21 2230    sodium chloride flush 0.9 % injection 10 mL, 10 mL, Intravenous, PRN, Kameron Boone MD    enoxaparin (LOVENOX) injection 40 mg, 40 mg, Subcutaneous, Daily, Kameron Boone MD, 40 mg at 02/19/21 2230    promethazine (PHENERGAN) tablet 12.5 mg, 12.5 mg, Oral, Q6H PRN **OR** ondansetron (ZOFRAN) injection 4 mg, 4 mg, Intravenous, Q6H PRN, Kameron Boone MD    polyethylene glycol (GLYCOLAX) packet 17 g, 17 g, Oral, Daily PRN, Kameron Boone MD    acetaminophen (TYLENOL) tablet 650 mg, 650 mg, Oral, Q6H PRN, 650 mg at 02/20/21 0536 **OR** acetaminophen (TYLENOL) suppository 650 mg, 650 mg, Rectal, Q6H PRN, Kameron Boone MD    potassium chloride (KLOR-CON M) extended release tablet 40 mEq, 40 mEq, Oral, PRN **OR** potassium bicarb-citric acid (EFFER-K) effervescent tablet 40 mEq, 40 mEq, Oral, PRN **OR** potassium chloride 10 mEq/100 mL IVPB (Peripheral Line), 10 mEq, Intravenous, PRN, Kameron Boone MD    magnesium sulfate 2000 mg in 50 mL IVPB premix, 2,000 mg, Intravenous, PRN, Kameron Boone MD    0.9 % sodium chloride infusion, , Intravenous, Continuous, Todd Jhoan, DO, Last Rate: 75 mL/hr at 02/20/21 0542, New Bag at 02/20/21 0542    meclizine (ANTIVERT) tablet 12.5 mg, 12.5 mg, Oral, TID PRN, Rian Reaves MD      ALLERGIES:  Other    SOCIAL HISTORY:    She states she is a former tobacco smoker. States she only smoked for approximately five years in her 25s. At that time, she was smoking less than one pack/day. She denies former or current alcohol or illicit drug use. FAMILY HISTORY:   She denies any pertinent cardiac family medical history to me at this time. REVIEW OF SYSTEMS:     · Constitutional: +recent weight loss (~20 lbs last few months). +fatigue with exertion. Denies fevers, chills, night sweats. · HEENT: Denies nose bleeds, rhinorrhea, sore throat. Denies blurred vision. Denies dysphagia, odynophagia. · Musculoskeletal: Denies falls, pain to BLE with ambulation. · Neurological: +dizziness, +near syncope. Denies numbness and tingling. Denies focal neurological deficits. · Cardiovascular: +orthopnea. Denies chest pain, palpitations, diaphoresis. Denies syncope. Denies peripheral edema. · Respiratory: +dyspnea on exertion. Denies cough, hemoptysis. · Gastrointestinal: Denies abdominal pain, nausea/vomiting, diarrhea and constipation, black/bloody, and tarry stools. · Genitourinary: Denies dysuria and hematuria. · Hematologic: Denies excessive bruising or bleeding. · Endocrine: Denies excessive thirst. Denies intolerance to hot and cold. PHYSICAL EXAM:   /84   Pulse 77   Temp 97.5 °F (36.4 °C) (Temporal)   Resp 16   Ht 5' 8\" (1.727 m)   Wt 140 lb (63.5 kg)   SpO2 99%   BMI 21.29 kg/m²   CONST:  Well developed, well nourished elderly WF who appears stated age. Awake, alert, cooperative, no apparent distress. HEENT:   Head- Normocephalic, atraumatic. Eyes- Conjunctivae pink, anicteric. Neck-  No stridor, trachea midline, +jugular venous distention. CHEST: Chest symmetrical and non-tender to palpation. No accessory muscle use or intercostal retractions. RESPIRATORY: Lung sounds - clear throughout fields. No wheezing, rales or rhonchi. CARDIOVASCULAR:     No noted carotid bruit. Heart Inspection- shows no noted pulsations. Heart Ausculation- Regular rate and rhythm, 3/6 loud/harsh systolic murmur throughout, heard best RUSB. PV: No lower extremity edema. Pedal pulses palpable, no clubbing or cyanosis. ABDOMEN: Soft, non-tender to light palpation. Bowel sounds present. MS: Good muscle strength and tone. No atrophy or abnormal movements. SKIN: Warm and dry. NEURO / PSYCH: Oriented to person, place. Speech clear and appropriate. Follows all commands. Pleasant affect. DATA:    Telemetry: Artifact, ventricular bigeminy HR in the 70-80s    Diagnostic:  All diagnostic testing and lab work thus far this admission reviewed in detail. Echocardiogram (Dr. Adrian Pike, 1/2021)  Summary:   Left ventricle grossly normal in size. Mild-moderate left ventricular concentric hypertrophy noted. Normal LV segmental wall motion. Estimated left ventricular ejection fraction is 68±5%. <50% criteria for diastolic dysfunction. The LAESV Index is >34 ml/m2. Mildly dilated right ventricle. Right ventricle global systolic function is mildly reduced . TAPSE is decreased   Mild mitral regurgitation is present with 3 jets. The mean gradient across the aortic valve is calculated as 100 mmHg by   doppler. Severe aortic stenosis is present. Trace aortic regurgitation is noted. Physiologic and/or trace pulmonic regurgitation present. Physiologic and/or trace tricuspid regurgitation. RVSP is 33 mmHg. There is a small pericardial effusion. Technically good quality study. Compared to prior echo, changes noted. Suggest clinical correlation.     Limited echocardiogram (Dr. Michelle Velasquez, 2/2021)   Summary   **Limited study for pericardial effusion** Normal left ventricle size. Estimated left ventricle ejection fraction   65+/-5 %. Normal right ventricular size and function. Moderate circumferential pericardial effusion. There is no RA/RV collapse   to suggest tamponade. IVC is normal size. CTA TAVR 2/19/2021:  Impression  1. Severe aortic valvular calcification.  Ascending thoracic aorta measures  3.6 cm.  No significant iliac vessel stenosis. 2. Moderate extrahepatic and central intrahepatic biliary ductal dilatation  as well as moderate dilatation of the main pancreatic duct.  No stone or mass  is seen by CT.  Consider further evaluation with nonemergent pancreas  protocol MRI. 3. Moderate left pleural effusion. 4. Unchanged 1.1 cm ground-glass opacity of the right lung apex.  Recommend  follow-up chest CT in 6-12 months per Fleischner criteria. 5. Large volume stool throughout the colon. CXR 2/19/2021:  Impression:  Enlargement of the cardiac silhouette and left pleural effusion are overall  similar to the previous chest x-ray from 01/25/2021, status post interval  removal of the left chest tube.           Intake/Output Summary (Last 24 hours) at 2/20/2021 0805  Last data filed at 2/20/2021 0542  Gross per 24 hour   Intake 750 ml   Output --   Net 750 ml       Labs:   CBC:   Recent Labs     02/19/21  1621 02/20/21  0722   WBC 6.1 9.5   HGB 12.1 10.9*   HCT 40.5 34.7    191     BMP:   Recent Labs     02/19/21 1621 02/19/21 2127    137   K 4.3 4.2   CO2 24 21*   BUN 27* 32*   CREATININE 0.9 1.0   LABGLOM 60 53   CALCIUM 9.5 9.4     Mag:   Recent Labs     02/19/21  1621   MG 2.2     PT/INR:   Recent Labs     02/19/21  1621   PROTIME 12.0   INR 1.1     APTT:  Recent Labs     02/19/21  1621   APTT 28.6     CARDIAC ENZYMES:  Recent Labs     02/19/21  1621 02/19/21 2127 02/20/21  0014   TROPONINI <0.01 <0.01 <0.01     FASTING LIPID PANEL:  Lab Results   Component Value Date    CHOL 216 02/27/2019    HDL 63 02/27/2019    LDLCALC 140 physical exam, and assessment / medical decision making. HPI, ROS, PMH, PSH, [address], [place], and medications independently reviewed (agree; see above documentation)    History of Present Illness:  Currently with no chest pain, respiratory distress, or palpitations. SR on telemetry.     Review of Systems:   Cardiac: As per HPI  General: No fever, chills  Pulmonary: As per HPI  HEENT: No visual disturbances, difficult swallowing  GI: No nausea, vomiting  : No dysuria, hematuria  Endocrine: No thyroid disease or DM  Musculoskeletal: GALVAN x 4, no focal motor deficits  Skin: Intact, no rashes  Neuro: No headache, seizures  Psych: Currently with no depression, anxiety    Physical Exam:  /81   Pulse 75   Temp 97.4 °F (36.3 °C) (Temporal)   Resp 16   Ht 5' 8\" (1.727 m)   Wt 140 lb (63.5 kg)   SpO2 98%   BMI 21.29 kg/m²   Wt Readings from Last 3 Encounters:   02/19/21 140 lb (63.5 kg)   02/19/21 140 lb (63.5 kg)   01/29/21 151 lb 3.2 oz (68.6 kg)     Appearance: Awake, alert, no acute respiratory distress  Skin: Intact, no rash  Head: Normocephalic, atraumatic  Eyes: EOMI, no conjunctival erythema  ENMT: No pharyngeal erythema, MMM, no rhinorrhea  Neck: Supple, no elevated JVP, no carotid bruits  Lungs: Decreased BS B/L, no wheezing  Cardiac: Regular rate and rhythm, 3/6 late peaking harsh JOZEF > RUSB  Abdomen: Soft, nontender, +bowel sounds  Extremities: Moves all extremities x 4, no lower extremity edema  Neurologic: No focal motor deficits apparent, normal mood and affect    Laboratory Tests:  Recent Labs     02/19/21  1621 02/19/21 2127 02/20/21  0722    137 139   K 4.3 4.2 3.8    103 103   CO2 24 21* 28   BUN 27* 32* 27*   CREATININE 0.9 1.0 0.9   GLUCOSE 128* 263* 133*   CALCIUM 9.5 9.4 9.2     Lab Results   Component Value Date    MG 2.2 02/19/2021     Recent Labs     02/19/21  1621 02/19/21 2127 02/20/21  0722   ALKPHOS 73 71 65   ALT 7 8 8   AST 13 13 13   PROT 7.7 7.4 6.7   BILITOT 0.4 0.3 0.3 LABALBU 4.0 3.9 3.8     Recent Labs     02/19/21  1621 02/20/21  0722   WBC 6.1 9.5   RBC 4.65 3.99   HGB 12.1 10.9*   HCT 40.5 34.7   MCV 87.1 87.0   MCH 26.0 27.3   MCHC 29.9* 31.4*   RDW 16.9* 16.9*    191   MPV 11.9 11.7     Lab Results   Component Value Date    CKTOTAL 101 12/25/2014    CKMB 14.1 (H) 05/21/2014    TROPONINI <0.01 02/20/2021    TROPONINI <0.01 02/19/2021    TROPONINI <0.01 02/19/2021     Lab Results   Component Value Date    TSH 1.770 01/25/2021     No results found for: LABA1C  No results found for: EAG  Lab Results   Component Value Date    CHOL 216 (H) 02/27/2019    CHOL 244 (H) 07/19/2016    CHOL 241 (H) 02/22/2014     Lab Results   Component Value Date    TRIG 63 02/27/2019    TRIG 66 07/19/2016    TRIG 53 02/22/2014     Lab Results   Component Value Date    HDL 63 02/27/2019    HDL 87 07/19/2016    HDL 92 02/22/2014     Lab Results   Component Value Date    LDLCALC 140 (H) 02/27/2019    LDLCALC 144 (H) 07/19/2016    LDLCALC 138 (H) 02/22/2014     Lab Results   Component Value Date    LABVLDL 13 02/27/2019    LABVLDL 13 07/19/2016     No results found for: CHOLHDLRATIO  Recent Labs     02/19/21  1621   PROBNP 7,656*       Telemetry reviewed (date: 2/20/2021): SR, rate 70's    Limited echocardiogram: 2/19/21   Normal left ventricle size. Estimated left ventricle ejection fraction   65+/-5 %. Normal right ventricular size and function. Moderate circumferential pericardial effusion. There is no RA/RV collapse to suggest tamponade. IVC is normal size.     - Critical aortic stenosis  - Images from repeat echocardiogram reviewed today  - Currently with no distress at rest  - Hold further IV fluids  - Telemetry reviewed (SR)  - TAVR CTAs completed / results reviewed and discussed with Dr. Nath Dears is for cardiac catheterization on Monday (2/22/21) and TAVR this coming week  - Follow-up heme/onc, surgery, and pulmonary recommendations    Thank you for allowing me to participate in your patient's care. Please feel free to contact me if you have any questions or concerns.     Zoe Jones MD  [facility] [place] ([facility]) Cardiology

## 2021-02-20 NOTE — PROGRESS NOTES
Hospitalist Progress Note      PCP: Gail Mora DO    Date of Admission: 2/19/2021    Hospital Course: \"82 y.o. female history of dementia, GERD, hypertension, critical aortic stenosis who was sent from Dr. Prabhakar [facility] for evaluation of presyncope symptoms. The patient had recent admission in January 2021 for presyncope. He was noted to have large pericardial effusion with tamponade physiology. She had emergent pericardiocentesis with 1.4 liters removed by CT surgery. Cytology suspicious for adenocarcinoma. She was also noted to have critical AS. Work-up for TAVR was to be started. She was seen in cardiology clinic today for follow-up. Patient states she has dyspnea on ambulation. She has intermittent lightheadedness on standing. She has dementia. Some of the history is collaborated from her son Sbarina Geronimo. In ER, BP was 108/60 on arrival.  Lab work is pertinent for BNP 7256, troponin <0.01. CTA chest/abdomen/pelvis pending. Emergent echocardiogram shows moderate circumferential pericardial effusion with no tamponade physiology. \"       Subjective:   Patient was tearful because of concerns for malignancy. She is eating at bedside. No complaints.     Medications:  Reviewed    Infusion Medications    [Held by provider] sodium chloride 75 mL/hr at 02/20/21 0542     Scheduled Medications    polyethylene glycol  17 g Oral Daily    docusate sodium  100 mg Oral BID    venlafaxine  75 mg Oral Daily    sodium chloride flush  10 mL Intravenous 2 times per day    enoxaparin  40 mg Subcutaneous Daily     PRN Meds: bisacodyl, perflutren lipid microspheres, sodium chloride flush, promethazine **OR** ondansetron, acetaminophen **OR** acetaminophen, potassium chloride **OR** potassium alternative oral replacement **OR** potassium chloride, magnesium sulfate, meclizine      Intake/Output Summary (Last 24 hours) at 2/20/2021 1330  Last data filed at 2/20/2021 0542  Gross per 24 hour   Intake 750 ml   Output -- Net 750 ml       Physical Exam Performed:    /81   Pulse 75   Temp 97.4 °F (36.3 °C) (Temporal)   Resp 16   Ht 5' 8\" (1.727 m)   Wt 140 lb (63.5 kg)   SpO2 98%   BMI 21.29 kg/m²   General appearance:  No apparent distress, appears stated age and cooperative. Elderly female. HEENT:  Normal cephalic, atraumatic without obvious deformity. Pupils equal, round, and reactive to light. Extra ocular muscles intact. Conjunctivae/corneas clear. Neck: Supple, with full range of motion. No jugular venous distention. Trachea midline. Respiratory:  Normal respiratory effort. Diminished sounds at bases. Cardiovascular:  Regular rate and rhythm with normal S1/S2 ,+ murmurs, rubs or gallops. Abdomen: Soft, non-tender, non-distended with normal bowel sounds. Musculoskeletal:  No clubbing, cyanosis or edema bilaterally. Full range of motion without deformity. Skin: Skin color, texture, turgor normal.  No rashes or lesions. Neurologic:  Neurovascularly intact without any focal sensory/motor deficits.  Cranial nerves: II-XII intact, grossly non-focal.  Psychiatric:  Alert and oriented  X 2         Labs:   Recent Labs     02/19/21 1621 02/20/21  0722   WBC 6.1 9.5   HGB 12.1 10.9*   HCT 40.5 34.7    191     Recent Labs     02/19/21 1621 02/19/21 2127 02/20/21  0722    137 139   K 4.3 4.2 3.8    103 103   CO2 24 21* 28   BUN 27* 32* 27*   CREATININE 0.9 1.0 0.9   CALCIUM 9.5 9.4 9.2     Recent Labs     02/19/21 1621 02/19/21 2127 02/20/21  0722   AST 13 13 13   ALT 7 8 8   BILITOT 0.4 0.3 0.3   ALKPHOS 73 71 65     Recent Labs     02/19/21 1621   INR 1.1     Recent Labs     02/19/21 1621 02/19/21 2127 02/20/21  0014   TROPONINI <0.01 <0.01 <0.01       Urinalysis:      Lab Results   Component Value Date    NITRU Negative 01/24/2021    WBCUA 0-1 01/24/2021    WBCUA 0-1 06/24/2011    BACTERIA NONE SEEN 01/24/2021    RBCUA 0-1 01/24/2021    RBCUA NONE 06/24/2011    BLOODU SMALL 01/24/2021 SPECGRAV <=1.005 01/24/2021    GLUCOSEU Negative 01/24/2021    GLUCOSEU 100 06/24/2011       Radiology:  XR CHEST PORTABLE   Final Result   Enlargement of the cardiac silhouette and left pleural effusion are overall   similar to the previous chest x-ray from 01/25/2021, status post interval   removal of the left chest tube. MRI ABDOMEN W WO CONTRAST MRCP    (Results Pending)           Assessment/Plan:    Active Hospital Problems    Diagnosis    Critical aortic valve stenosis [I35.0]        There are no active hospital problems to display for this patient. Presyncope-likely related to severe AS and possibly vertigo  -She has known critical AS. No tamponade noted. Cardiology will be evaluating for TAVR. -Orthostatics  -Fall precautions  -Telemetry  -PT/OT   -As needed meclizine     Adenocarcinoma  -No definitive malignancy seen on CT chest/Abdo/pelvis  -Oncology workup noted. Pulmonology consulted for nodule.     Large pericardial effusion with tamponade s/p pericardiocentesis 1/25/21  -Repeat echocardiogram shows moderate pericardial effusion with no tamponade physiology  -As per cardiology     Critical aortic stenosis  -TAVR vs valvuloplasty depending malignancy staging.  -Left heart cath planned on Monday. Dilated intra and extrahepatic ducts  -Check MRCP  -General Surgery was consulted.     Dementia  -Not on any medication.  -Delirium precautions     Constipation  -Start stool softeners and laxatives       DVT Prophylaxis: Lovenox  Diet: DIET CARDIAC;   Diet NPO, After Midnight  Code Status: Full Code    PT/OT Eval Status: Ordered ordered    Dispo -pending work-up    Frankie Patel MD

## 2021-02-20 NOTE — CONSULTS
Inpatient Medical Oncology Consult Note    Reason for Visit: Consultation on a patient with metastatic adenocarcinoma    Referring Physician: Brooklyn Dangelo DO    PCP:  Dinah Bañuelos DO    History of Present Illness:  82 y. o. female history of dementia, GERD, hypertension, critical aortic stenosis who had recent admission in January 2021 for presyncope. Noted to have large pericardial effusion with tamponade physiology.  She had emergent pericardiocentesis with 1.4 liters removed by CT surgery.  Cytology suspicious for adenocarcinoma.  She was also noted to have critical AS. Work-up for TAVR was started. She was seen in cardiology clinic yesterday, had dyspnea on ambulation and intermittent lightheadedness on standing. Emergent echocardiogram shows moderate circumferential pericardial effusion with no tamponade physiology. CTA chest/abdomen/pelvis severe aortic calcifications  Moderate extrahepatic and central intrahepatic biliary ductal dilatation as well as moderate dilatation of the main pancreatic duct. Moderate left pleural effusion. Unchanged 1.1 cm ground glass opacity right lung apex. Review of Systems;  CONSTITUTIONAL: No fever, chills. fair appetite and energy level. ENMT: Eyes: No diplopia; Nose: No epistaxis. Mouth: No sore throat. RESPIRATORY: No hemoptysis. + shortness of breath. CARDIOVASCULAR: No chest pain, palpitations. GASTROINTESTINAL: No nausea/vomiting, abdominal pain, diarrhea/constipation. GENITOURINARY: No dysuria, urinary frequency, hematuria. NEURO: No syncope, presyncope, headache.   Remainder:  ROS NEGATIVE    Past Medical History:      Diagnosis Date    Accidental drug overdose 5/21/2014    Acute delirium 12/26/2014    Anxiety     Arthritis     Dizziness, nonspecific     Generalized headaches     Heart murmur     stable per pt    Hypertension     Knee pain     right    Malnutrition ([address] 12/26/2014    Migraine headache     Polypharmacy 12/26/2014 intrahepatic biliary ductal dilatation as well as moderate dilatation of the main pancreatic duct. Moderate left pleural effusion. Unchanged 1.1 cm ground glass opacity right lung apex. Tumor markers drawn today including CEA,  AFP  MRI Abdomen ordered  Hepatobiliary team (Dr. Jose Cao) consulted given Moderate extrahepatic and central intrahepatic biliary ductal dilatation as well as moderate dilatation of the main pancreatic duct. Moderate left pleural effusion and 1.1 cm opacity right lung apex  Pulmonary team consulted for further evaluation  Left a message to her son Colin Peter to call us back  Will continue to follow. Thank you for allowing us to participate in the care of Mrs. Soto.     Lauren Lara MD   2/21/8802  Board Certified Medical Oncologist

## 2021-02-20 NOTE — CONSULTS
Pulmonary [facility]                             Pulmonary Consult/Progress Note :          Patient: Suzi Fontaine  MRN: [id number]  : 1935      Date of Admission: .2021  3:47 PM    Consulting Physician:Syncope ,dizzness         Reason for Consultation:pleural effusion   CC : SOB ,mild . dizziness   HPI:   Suzi Fontaine is a 82y.o. year old with sevre aortic stenosis and pericardial effusion   That show last time drain possible adeno ,work up for malignancy and CT chest shows left side pleural effusion and RUL nodule that has been stable. I was consulted for pleural effusion   There is concern about hepatic duct dilation     PAST MEDICAL HISTORY:   Past Medical History:   Diagnosis Date    Accidental drug overdose 2014    Acute delirium 2014    Anxiety     Arthritis     Dizziness, nonspecific     Generalized headaches     Heart murmur     stable per pt    Hypertension     Knee pain     right    Malnutrition ([address] 2014    Migraine headache     Polypharmacy 2014    Reflux gastritis     Rhabdomyolysis 2014    Sinus problem        PAST SURGICAL HISTORY:   Past Surgical History:   Procedure Laterality Date    APPENDECTOMY      BACK SURGERY      lumbar    CHOLECYSTECTOMY      HYSTERECTOMY      PERICARDIUM SURGERY N/A 2021    PERICARDIOCENTESIS performed by Kierra Dixon MD at [address] [place] [address] Rd  2012    right       FAMILY HISTORY:   History reviewed. No pertinent family history.     SOCIAL HISTORY:   Social History     Socioeconomic History    Marital status:      Spouse name: Not on file    Number of children: Not on file    Years of education: Not on file    Highest education level: Not on file   Occupational History    Not on file   Social Needs    Financial resource strain: Not on file    Food insecurity     Worry: Not on file     Inability: Not on file   [facility] SYSTEMS:  General ROS:  No weight loss ,no fatigue     ENT ROS:   No Sore throat ,no lymphoadenopathy,no nasal stuffiness     Hematological and Lymphatic ROS:   No ecchymosis ,no tendency to bleed  Respiratory ROS:   No SOB   Cardiovascular ROS:   No CP,No Palpitation   Gastrointestinal ROS:   No Gi bleed,no nausea or vomiting      - Musculoskeletal ROS:      - no joint swelling ,no joint pain   Neurological ROS:     -no weakness or numbness    Dermatological ROS:   No skin rash ,no urticaria     PHYSICAL EXAMINATION:     VITAL SIGNS:  /81   Pulse 75   Temp 97.4 °F (36.3 °C) (Temporal)   Resp 16   Ht 5' 8\" (1.727 m)   Wt 140 lb (63.5 kg)   SpO2 98%   BMI 21.29 kg/m²   Wt Readings from Last 3 Encounters:   02/19/21 140 lb (63.5 kg)   02/19/21 140 lb (63.5 kg)   01/29/21 151 lb 3.2 oz (68.6 kg)     Temp Readings from Last 3 Encounters:   02/20/21 97.4 °F (36.3 °C) (Temporal)   01/29/21 96.9 °F (36.1 °C)   09/11/12 98.1 °F (36.7 °C) (Oral)     TMAX:  BP Readings from Last 3 Encounters:   02/20/21 135/81   02/19/21 108/60   01/29/21 110/68     Pulse Readings from Last 3 Encounters:   02/20/21 75   02/19/21 77   01/29/21 85           INTAKE/OUTPUTS:  I/O last 3 completed shifts:   In: 750 [I.V.:750]  Out: -     Intake/Output Summary (Last 24 hours) at 2/20/2021 1241  Last data filed at 2/20/2021 0542  Gross per 24 hour   Intake 750 ml   Output --   Net 750 ml       General Appearance: alert and oriented to person, place and time, well-developed and   well-nourished, in no acute distress   Eyes: pupils equal, round, and reactive to light, extraocular eye movements intact, conjunctivae normal and sclera anicteric   Neck: neck supple and non tender without mass, no thyromegaly, no thyroid nodules and no cervical adenopathy   Pulmonary/Chest:CTA   Cardiovascular: normal rate, regular rhythm, normal S1 and S2, no murmurs, rubs, clicks or gallops, distal pulses intact, no carotid bruits, no murmurs, no gallops, no carotid bruits and no JVD   Abdomen: obese, soft, non-tender, non-distended, normal bowel sounds, no masses or organomegaly   Extremities:No edema  Musculoskeletal: normal range of motion, no joint swelling, deformity or tenderness   Neurologic: reflexes normal and symmetric, no cranial nerve deficit noted    LABS/IMAGING:    CBC:  Lab Results   Component Value Date    WBC 9.5 02/20/2021    HGB 10.9 (L) 02/20/2021    HCT 34.7 02/20/2021    MCV 87.0 02/20/2021     02/20/2021    LYMPHOPCT 5.3 (L) 02/19/2021    RBC 3.99 02/20/2021    MCH 27.3 02/20/2021    MCHC 31.4 (L) 02/20/2021    RDW 16.9 (H) 02/20/2021    NEUTOPHILPCT 92.6 (H) 02/19/2021    MONOPCT 1.2 (L) 02/19/2021    BASOPCT 0.2 02/19/2021    NEUTROABS 5.62 02/19/2021    LYMPHSABS 0.32 (L) 02/19/2021    MONOSABS 0.07 (L) 02/19/2021    EOSABS 0.00 (L) 02/19/2021    BASOSABS 0.01 02/19/2021       Recent Labs     02/20/21  0722 02/19/21  1621 02/10/21  0433   WBC 9.5 6.1 6.1   HGB 10.9* 12.1 11.4*   HCT 34.7 40.5 36.2   MCV 87.0 87.1 85.8    217 243       BMP:   Recent Labs     02/19/21 1621 02/19/21 2127 02/20/21  0722    137 139   K 4.3 4.2 3.8    103 103   CO2 24 21* 28   BUN 27* 32* 27*   CREATININE 0.9 1.0 0.9       MG:   Lab Results   Component Value Date    MG 2.2 02/19/2021     Ca/Phos:   Lab Results   Component Value Date    CALCIUM 9.2 02/20/2021     Amylase: No results found for: AMYLASE  Lipase: No results found for: LIPASE  LIVER PROFILE:   Recent Labs     02/19/21 1621 02/19/21 2127 02/20/21  0722   AST 13 13 13   ALT 7 8 8   BILITOT 0.4 0.3 0.3   ALKPHOS 73 71 65       PT/INR:   Recent Labs     02/19/21  1621   PROTIME 12.0   INR 1.1     APTT:   Recent Labs     02/19/21  1621   APTT 28.6       Cardiac Enzymes:  Lab Results   Component Value Date    CKTOTAL 101 12/25/2014    CKMB 14.1 (H) 05/21/2014    TROPONINI <0.01 02/20/2021                  PROBLEM LIST:  Patient Active Problem List   Diagnosis    Anxiety    Migraine headache    Accidental drug overdose    Essential hypertension, benign    GERD (gastroesophageal reflux disease)    Dementia (HCC)    Polypharmacy    Malnutrition (HCC)    Acute delirium    Pericardial effusion    Pain syndrome, chronic    Critical aortic valve stenosis               ASSESSMENT:  1.) left side pleural effusion  2. )RUL lung nodule  3. )dilated hepatic duct   4- Possible malignancy   5- AS  PLAN:  *-Fluid very mild ,no fever no SOB so doubt need to drain  *-If no diagnostic found or source for malignancy and diagnotic tap needed then I will do it  *-Lung nodule unchanged and unlikely causing mets even if malignant ,will follow as per guidelines   *- for heart cath and TAVR Monday           Thank you very much for allowing me to participate in the care of this pleasant patient , should you have any questions ,please do not hesitate to contact me      [address] [facility] MD [facility]  Pulmonary&Critical Care Medicine   Director of [address] [place] [address] Director of [address] [place] [address]    Julio Koo    NOTE: This report was transcribed using voice recognition software. Every effort was made to ensure accuracy; however, inadvertent computerized transcription errors may be present.

## 2021-02-20 NOTE — CONSULTS
Hepatobiliary and Pancreatic Surgery Attending History and Physical    Patient's Name/Date of Birth: Juan Moses /1935 (89 y.o.)    Date: February 20, 2021     CC: Syncope / Dizziness    HPI:    Patient is being seen for repeated syncopal episodes. She has a history of severe aortic stenosis and pericardial effusion. Previous pericardial effusion cytology demonstrated possible adenocarcinoma. Patient is being worked up for malignancy. CT of the chest demonstrating pulmonary nodule. CT abdomen pelvis demonstrating intra and extrahepatic ductal dilation without obvious mass appreciated. There is also pancreatic ductal dilation present. Patient's last colonoscopy was 10 years ago which was normal.  Patient has an 8-year smoking. Cardiology is planning for TAVR.  Liver enzymes are normal. MRI with and without contrast pending        Past Medical History:   Diagnosis Date    Accidental drug overdose 5/21/2014    Acute delirium 12/26/2014    Anxiety     Arthritis     Dizziness, nonspecific     Generalized headaches     Heart murmur     stable per pt    Hypertension     Knee pain     right    Malnutrition ([address] 12/26/2014    Migraine headache     Polypharmacy 12/26/2014    Reflux gastritis     Rhabdomyolysis 5/21/2014    Sinus problem        Past Surgical History:   Procedure Laterality Date    APPENDECTOMY      BACK SURGERY  2007    lumbar    CHOLECYSTECTOMY      HYSTERECTOMY      PERICARDIUM SURGERY N/A 1/25/2021    PERICARDIOCENTESIS performed by Ilia Mcknight MD at [address] [place] [address]  9/17/2012    right       Current Facility-Administered Medications   Medication Dose Route Frequency Provider Last Rate Last Admin    polyethylene glycol (GLYCOLAX) packet 17 g  17 g Oral Daily Faustina Yang MD   17 g at 02/20/21 1407    docusate sodium (COLACE) capsule 100 mg  100 mg Oral BID Faustina Yang MD   100 mg at 02/20/21 1407    bisacodyl (DULCOLAX) EC tablet 5 mg  5 mg Oral Daily PRN Brayan Pabon MD        perflutren lipid microspheres (DEFINITY) injection 1.65 mg  1.5 mL Intravenous ONCE PRN GEOVANNY Luis        venlafaxine [facility]) tablet 75 mg  75 mg Oral Daily Brayan Pabon MD   75 mg at 02/20/21 0835    sodium chloride flush 0.9 % injection 10 mL  10 mL Intravenous 2 times per day Brayan Pabon MD   10 mL at 02/20/21 0844    sodium chloride flush 0.9 % injection 10 mL  10 mL Intravenous PRN Brayan Pabon MD        enoxaparin (LOVENOX) injection 40 mg  40 mg Subcutaneous Daily Brayan Pabon MD   40 mg at 02/19/21 2230    promethazine (PHENERGAN) tablet 12.5 mg  12.5 mg Oral Q6H PRN Brayan Pabon MD        Or    ondansetron (ZOFRAN) injection 4 mg  4 mg Intravenous Q6H PRN Brayan Pabon MD        acetaminophen (TYLENOL) tablet 650 mg  650 mg Oral Q6H PRN Brayan Pabon MD   650 mg at 02/20/21 1407    Or    acetaminophen (TYLENOL) suppository 650 mg  650 mg Rectal Q6H PRN Brayan Pabon MD        potassium chloride (KLOR-CON M) extended release tablet 40 mEq  40 mEq Oral PRN Brayan Pabon MD        Or    potassium bicarb-citric acid (EFFER-K) effervescent tablet 40 mEq  40 mEq Oral PRN Brayan Pabon MD        Or    potassium chloride 10 mEq/100 mL IVPB (Peripheral Line)  10 mEq Intravenous PRN Brayan Pabon MD        magnesium sulfate 2000 mg in 50 mL IVPB premix  2,000 mg Intravenous PRN Brayan Pabon MD        [Held by provider] 0.9 % sodium chloride infusion   Intravenous Continuous Todd Jhoan, DO 75 mL/hr at 02/20/21 0542 New Bag at 02/20/21 0542    meclizine (ANTIVERT) tablet 12.5 mg  12.5 mg Oral TID PRN Brayan Pabon MD   12.5 mg at 02/20/21 1407       Allergies   Allergen Reactions    Other Rash     \"A type of IVP dye from the 50's\"       History reviewed. No pertinent family history.     Social History     Socioeconomic History    Marital status:      Spouse name: Not on file    Number of children: Not on file    Years of education: Not on file    Highest education level: Not on file   Occupational History    Not on file   Social Needs    Financial resource strain: Not on file    Food insecurity     Worry: Not on file     Inability: Not on file    Transportation needs     Medical: Not on file     Non-medical: Not on file   Tobacco Use    Smoking status: Former Smoker     Years: 45.00     Quit date: 2007     Years since quittin.4    Smokeless tobacco: Never Used   Substance and Sexual Activity    Alcohol use: No    Drug use: No    Sexual activity: Not on file   Lifestyle    Physical activity     Days per week: Not on file     Minutes per session: Not on file    Stress: Not on file   Relationships    Social connections     Talks on phone: Not on file     Gets together: Not on file     Attends Yarsani service: Not on file     Active member of club or organization: Not on file     Attends meetings of clubs or organizations: Not on file     Relationship status: Not on file    Intimate partner violence     Fear of current or ex partner: Not on file     Emotionally abused: Not on file     Physically abused: Not on file     Forced sexual activity: Not on file   Other Topics Concern    Not on file   Social History Narrative    Not on file       ROS:   Review of Systems   Constitutional: Positive for activity change and fatigue. HENT: Negative for congestion and sore throat. Eyes: Positive for visual disturbance. Negative for pain. Respiratory: Negative for chest tightness and shortness of breath. Cardiovascular: Negative for chest pain and leg swelling. Gastrointestinal: Positive for constipation. Negative for abdominal pain. Genitourinary: Negative for difficulty urinating and dysuria. Skin: Negative for color change and rash. Neurological: Positive for dizziness and light-headedness. Hematological: Negative for adenopathy. Does not bruise/bleed easily. Physical Exam:  Vitals:    02/20/21 1500   BP: 135/66   Pulse: 73   Resp: 18   Temp: 97.6 °F (36.4 °C)   SpO2: 97%       PSYCH: mood and affect normal, alert and oriented x 3: No apparent distress, comfortable  EYES: Sclera white, pupils equal round and reactive to light  ENMT:  Hearing normal, trachea midline, ears externally intact  LYMPH: no obvious lympadenopathy in neck. RESP: Respiratory effort was normal with no retractions or use of accessory muscles.   CV:  No pedal edema  GI/ Abdomen: Soft, nondistended, nontender, no guarding, no peritoneal signs  MSK: no clubbing/ no cyanosis/ gaitnormal       Assessment/Plan: Intra and Extra hepatic duct dilation, pancreatic duct dilation, and cytology from pericardial effusion likely adenocarcinoma.     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

## 2021-02-20 NOTE — PROGRESS NOTES
TAVR CTAs personally reviewed:  Calcification: heavy leaflet calcification  Annular area: 385-395  Annular perimeter: 70-72  Annular dimensions: 19.9x25.8  LVOT dimensions: 18.3x25, area 349 at -4mm  Sinus of Valsalva dimensions: L 27.3, R 27.3, N 29.3  STJ height: 25.4  STJ dimensions: 28.6x29.6  Left coronary height: 13.8  Right coronary height: 18.9  Annular angle to the horizontal plane: 65 degrees  Implant angle: LAO36/CAU12    Right iliofemoral: small in the EIA but not calcified. Moderate posterior calcium in the CFA  Left iliofemoral: tortuous; small in the EIA but non-calcified.  Moderate posterior calcium in the CFA  Aorta is tortuous    Plan:   · 23-mm CHAN 3 Ultra (nominal and shallow) via RIF cutdown with left radial pigtail  · If predilation needed then with 18-mm balloon    César Glez MD, [facility]  Interventional Cardiology/Structural Heart Disease  Office: [phone]

## 2021-02-21 ENCOUNTER — APPOINTMENT (OUTPATIENT)
Dept: MRI IMAGING | Age: 86
DRG: 267 | End: 2021-02-21
Payer: MEDICARE

## 2021-02-21 LAB
ALBUMIN SERPL-MCNC: 3.8 G/DL (ref 3.5–5.2)
ALP BLD-CCNC: 65 U/L (ref 35–104)
ALT SERPL-CCNC: 8 U/L (ref 0–32)
ANION GAP SERPL CALCULATED.3IONS-SCNC: 6 MMOL/L (ref 7–16)
AST SERPL-CCNC: 13 U/L (ref 0–31)
BILIRUB SERPL-MCNC: 0.4 MG/DL (ref 0–1.2)
BUN BLDV-MCNC: 21 MG/DL (ref 8–23)
CALCIUM SERPL-MCNC: 9.4 MG/DL (ref 8.6–10.2)
CEA: 7 NG/ML (ref 0–5.2)
CHLORIDE BLD-SCNC: 104 MMOL/L (ref 98–107)
CO2: 31 MMOL/L (ref 22–29)
CREAT SERPL-MCNC: 0.9 MG/DL (ref 0.5–1)
GFR AFRICAN AMERICAN: >60
GFR NON-AFRICAN AMERICAN: 60 ML/MIN/1.73
GLUCOSE BLD-MCNC: 86 MG/DL (ref 74–99)
HCT VFR BLD CALC: 42.8 % (ref 34–48)
HEMOGLOBIN: 12.7 G/DL (ref 11.5–15.5)
MCH RBC QN AUTO: 26.5 PG (ref 26–35)
MCHC RBC AUTO-ENTMCNC: 29.7 % (ref 32–34.5)
MCV RBC AUTO: 89.2 FL (ref 80–99.9)
PDW BLD-RTO: 17.3 FL (ref 11.5–15)
PLATELET # BLD: 217 E9/L (ref 130–450)
PMV BLD AUTO: 11.9 FL (ref 7–12)
POTASSIUM REFLEX MAGNESIUM: 4.1 MMOL/L (ref 3.5–5)
RBC # BLD: 4.8 E12/L (ref 3.5–5.5)
SODIUM BLD-SCNC: 141 MMOL/L (ref 132–146)
TOTAL PROTEIN: 7.1 G/DL (ref 6.4–8.3)
WBC # BLD: 7.2 E9/L (ref 4.5–11.5)

## 2021-02-21 PROCEDURE — A9579 GAD-BASE MR CONTRAST NOS,1ML: HCPCS | Performed by: STUDENT IN AN ORGANIZED HEALTH CARE EDUCATION/TRAINING PROGRAM

## 2021-02-21 PROCEDURE — 85027 COMPLETE CBC AUTOMATED: CPT

## 2021-02-21 PROCEDURE — 86301 IMMUNOASSAY TUMOR CA 19-9: CPT

## 2021-02-21 PROCEDURE — 82378 CARCINOEMBRYONIC ANTIGEN: CPT

## 2021-02-21 PROCEDURE — 6370000000 HC RX 637 (ALT 250 FOR IP): Performed by: PHYSICIAN ASSISTANT

## 2021-02-21 PROCEDURE — 6360000004 HC RX CONTRAST MEDICATION: Performed by: STUDENT IN AN ORGANIZED HEALTH CARE EDUCATION/TRAINING PROGRAM

## 2021-02-21 PROCEDURE — 99222 1ST HOSP IP/OBS MODERATE 55: CPT | Performed by: SURGERY

## 2021-02-21 PROCEDURE — 36415 COLL VENOUS BLD VENIPUNCTURE: CPT

## 2021-02-21 PROCEDURE — 6360000002 HC RX W HCPCS: Performed by: INTERNAL MEDICINE

## 2021-02-21 PROCEDURE — 99233 SBSQ HOSP IP/OBS HIGH 50: CPT | Performed by: INTERNAL MEDICINE

## 2021-02-21 PROCEDURE — 86316 IMMUNOASSAY TUMOR OTHER: CPT

## 2021-02-21 PROCEDURE — 2060000000 HC ICU INTERMEDIATE R&B

## 2021-02-21 PROCEDURE — 6370000000 HC RX 637 (ALT 250 FOR IP): Performed by: INTERNAL MEDICINE

## 2021-02-21 PROCEDURE — 80053 COMPREHEN METABOLIC PANEL: CPT

## 2021-02-21 PROCEDURE — 82105 ALPHA-FETOPROTEIN SERUM: CPT

## 2021-02-21 PROCEDURE — 2580000003 HC RX 258: Performed by: INTERNAL MEDICINE

## 2021-02-21 PROCEDURE — 74183 MRI ABD W/O CNTR FLWD CNTR: CPT

## 2021-02-21 RX ORDER — DIPHENHYDRAMINE HCL 25 MG
50 TABLET ORAL ONCE
Status: COMPLETED | OUTPATIENT
Start: 2021-02-22 | End: 2021-02-22

## 2021-02-21 RX ADMIN — ACETAMINOPHEN 650 MG: 325 TABLET ORAL at 21:05

## 2021-02-21 RX ADMIN — VENLAFAXINE 75 MG: 75 TABLET ORAL at 09:37

## 2021-02-21 RX ADMIN — PREDNISONE 50 MG: 20 TABLET ORAL at 21:05

## 2021-02-21 RX ADMIN — DOCUSATE SODIUM 100 MG: 100 CAPSULE, LIQUID FILLED ORAL at 21:05

## 2021-02-21 RX ADMIN — ENOXAPARIN SODIUM 40 MG: 40 INJECTION SUBCUTANEOUS at 09:37

## 2021-02-21 RX ADMIN — POLYETHYLENE GLYCOL 3350 17 G: 17 POWDER, FOR SOLUTION ORAL at 09:37

## 2021-02-21 RX ADMIN — DOCUSATE SODIUM 100 MG: 100 CAPSULE, LIQUID FILLED ORAL at 09:37

## 2021-02-21 RX ADMIN — Medication 10 ML: at 09:37

## 2021-02-21 RX ADMIN — GADOTERIDOL 13 ML: 279.3 INJECTION, SOLUTION INTRAVENOUS at 08:32

## 2021-02-21 RX ADMIN — Medication 10 ML: at 21:08

## 2021-02-21 ASSESSMENT — ENCOUNTER SYMPTOMS
CONSTIPATION: 1
SORE THROAT: 0
COLOR CHANGE: 0
ABDOMINAL PAIN: 0
CHEST TIGHTNESS: 0
EYE PAIN: 0
SHORTNESS OF BREATH: 0

## 2021-02-21 ASSESSMENT — PAIN SCALES - GENERAL
PAINLEVEL_OUTOF10: 0
PAINLEVEL_OUTOF10: 5

## 2021-02-21 NOTE — CONSULTS
GENERAL SURGERY  CONSULT NOTE  2/21/2021    Physician Consulted: Dr. Areatha Gowers  Reason for Consult: Intrahepatic / Extrahepatic, pancreatic duct dialtion    CC: Syncope    CANDIDO Schreiber is a 82 y.o. female who presents for evaluation of repeated syncopal episodes. She has a history of severe aortic stenosis and pericardial effusion. Previous pericardial effusion cytology demonstrated possible adenocarcinoma. Patient is being worked up for malignancy. CT of the chest demonstrating pulmonary nodule. CT abdomen pelvis demonstrating intra and extrahepatic ductal dilation without obvious mass appreciated. There is also pancreatic ductal dilation present. Patient's last colonoscopy was 10 years ago which was normal.  Patient has an 8-year smoking. Cardiology is planning for TAVR. Liver enzymes are normal. MRI with and without contrast pending      Past Medical History:   Diagnosis Date    Accidental drug overdose 5/21/2014    Acute delirium 12/26/2014    Anxiety     Arthritis     Dizziness, nonspecific     Generalized headaches     Heart murmur     stable per pt    Hypertension     Knee pain     right    Malnutrition ([address] 12/26/2014    Migraine headache     Polypharmacy 12/26/2014    Reflux gastritis     Rhabdomyolysis 5/21/2014    Sinus problem        Past Surgical History:   Procedure Laterality Date    APPENDECTOMY      BACK SURGERY  2007    lumbar    CHOLECYSTECTOMY      HYSTERECTOMY      PERICARDIUM SURGERY N/A 1/25/2021    PERICARDIOCENTESIS performed by Odessa Pate MD at [address]  9/17/2012    right       Medications Prior to Admission:    Prior to Admission medications    Medication Sig Start Date End Date Taking?  Authorizing Provider   diphenhydrAMINE HCl (BENADRYL ALLERGY PO) Take by mouth   Yes Historical Provider, MD   predniSONE (DELTASONE) 50 MG tablet Take 50 mg by mouth daily   Yes Historical Provider, MD   acetaminophen (TYLENOL) 500 MG tablet Take 650 mg by mouth every 6 hours as needed for Pain   Yes Historical Provider, MD   bisacodyl (DULCOLAX) 10 MG suppository Place 1 suppository rectally daily as needed for Constipation 21 Yes Travis Moreno DO   sennosides-docusate sodium (SENOKOT-S) 8.6-50 MG tablet Take 1 tablet by mouth 2 times daily 21  Yes Travis Moreno DO   Cholecalciferol (VITAMIN D3) 1.25 MG (84119 UT) CAPS Take by mouth once a week   Yes Historical Provider, MD   venlafaxine (EFFEXOR) 37.5 MG tablet Take 75 mg by mouth daily    Yes Historical Provider, MD   aspirin 325 MG EC tablet Take 1 tablet by mouth daily. 14   Renford Phoenix, MD       Allergies   Allergen Reactions    Other Rash     \"A type of IVP dye from the 50's\"       History reviewed. No pertinent family history. Social History     Tobacco Use    Smoking status: Former Smoker     Years: 45.00     Quit date: 2007     Years since quittin.4    Smokeless tobacco: Never Used   Substance Use Topics    Alcohol use: No    Drug use: No         Review of Systems   General ROS: negative for - chills or fever  Hematological and Lymphatic ROS: On home   Respiratory ROS: no cough, shortness of breath, or wheezing  Cardiovascular ROS: Severe aortic stenosis  Gastrointestinal ROS: no abdominal pain,nausea or vomiting  Genito-Urinary ROS: no dysuria, trouble voiding, or hematuria  Musculoskeletal ROS: negative for - joint swelling or muscle pain      PHYSICAL EXAM:    Vitals:    21 1500   BP: 135/66   Pulse: 73   Resp: 18   Temp: 97.6 °F (36.4 °C)   SpO2: 97%       General Appearance:  awake, alert, oriented, in no acute distress  Skin:  Skin color, texture, turgor normal. No rashes or lesions. Head/face:  NCAT  Eyes:  No gross abnormalities.   Lungs:  Breathing Pattern: regular, no distress  Heart:  Heart regular rate and rhythm, systolic murmur appreciated  Abdomen:  Soft, nontender, nodistended  Extremities: Extremities warm to touch, pink, with no edema. LABS:    CBC  Recent Labs     02/20/21  0722   WBC 9.5   HGB 10.9*   HCT 34.7        BMP  Recent Labs     02/20/21  0722      K 3.8      CO2 28   BUN 27*   CREATININE 0.9   CALCIUM 9.2     Liver Function  Recent Labs     02/20/21  0722   BILITOT 0.3   AST 13   ALT 8   ALKPHOS 65   PROT 6.7   LABALBU 3.8     No results for input(s): LACTATE in the last 72 hours. Recent Labs     02/19/21  1621   INR 1.1       RADIOLOGY    Echo Limited    Result Date: 2/19/2021  Transthoracic Echocardiography Report (TTE)  Demographics   Patient Name      Guy Mcneal Gender              Female                    A   Medical Record    [id number]       Room Number         10  Number   Account #         [de-identified]      Procedure Date      02/19/2021   Corporate ID                     Ordering Physician  Sobia Esquivel MD   Accession Number  [id number]     Referring Physician   Date of Birth     1935     Sonographer         Theodore Morin RDCS   Age               82 year(s)     Interpreting        Sobia Esquivel MD                                   Physician                                    Any Other  Procedure Type of Study   TTE procedure:Echo Limited Study. Procedure Date Date: 02/19/2021 Start: 04:04 PM Study Location: ER Technical Quality: Good visualization Indications:Pericardial effusion. Patient Status: STAT Height: 69 inches Weight: 147 pounds BSA: 1.81 m^2 BMI: 21.71 kg/m^2  Findings   Left Ventricle  Normal left ventricle size. Estimated left ventricle ejection fraction 65+/-5 %. Right Ventricle  Normal right ventricular size and function. Aortic Valve  The aortic valve appears severely sclerotic. Pericardial Effusion  Moderate circumferential pericardial effusion. There is no RA/RV collapse  to suggest tamponade. IVC is normal size. There is a fibrinous echodensity  overlying the epicardium.    Miscellaneous  Normal Inferior Vena Cava diameter and respiratory variation. Conclusions   Summary  **Limited study for pericardial effusion**  Normal left ventricle size. Estimated left ventricle ejection fraction  65+/-5 %. Normal right ventricular size and function. Moderate circumferential pericardial effusion. There is no RA/RV collapse  to suggest tamponade. IVC is normal size. Signature   ----------------------------------------------------------------  Electronically signed by Lon Painting MD(Interpreting physician)  on 02/19/2021 04:41 PM  ----------------------------------------------------------------  [email or web address] [id number]    Xr Chest Portable    Result Date: 2/19/2021  EXAMINATION: ONE XRAY VIEW OF THE CHEST 2/19/2021 1:34 pm COMPARISON: One-view chest x-ray 01/25/2021. CTA chest 02/19/2021. HISTORY: ORDERING SYSTEM PROVIDED HISTORY: Shortness of breath TECHNOLOGIST PROVIDED HISTORY: Reason for exam:->Shortness of breath What reading provider will be dictating this exam?->CRC FINDINGS: There is moderate enlargement of the cardiac silhouette, which appears similar to mildly increased compared to the previous x-ray exam and may be exaggerated by AP technique. The mediastinal contours are within normal limits. There is evidence of aortic atherosclerosis. Apical and basilar interstitial thickening is similar to the previous x-ray exam, suggesting scarring. There is mild left basilar atelectasis. Mild-moderate left pleural effusion appears similar to slightly decreased compared to the previous chest x-ray, status post interval removal of the previously noted left chest tube. No significant right pleural effusion is visualized. Fusion hardware is partially imaged in the lumbar spine.   EKG leads overlie the chest.    Enlargement of the cardiac silhouette and left pleural effusion are overall similar to the series 8, image 39. Moderate left pleural effusion. Mild emphysema. Soft Tissues/Bones: Diffuse osteopenia with multilevel degenerative disc disease of the thoracolumbar spine. Abdomen/Pelvis: Organs: Liver is normal in contour and enhancement. Gallbladder is surgically absent. There is moderate extrahepatic and central intrahepatic biliary ductal dilatation as well as moderate dilatation of the main pancreatic duct. No obstructing mass or stone is seen. Adrenals are unremarkable. Spleen is normal in size. No renal calculi or hydronephrosis. Moderate calcific atherosclerosis. No significant iliac stenosis. GI/Bowel: Large volume stool throughout the colon. Bowel is non-dilated without wall thickening. Appendix is not seen though there are no secondary signs of appendicitis. Pelvis: Unremarkable. Peritoneum/Retroperitoneum:No free fluid, free air, organized fluid collection or lymphadenopathy. Bones: Diffuse osteopenia with multilevel degenerative disc disease. 1. Severe aortic valvular calcification. Ascending thoracic aorta measures 3.6 cm. No significant iliac vessel stenosis. 2. Moderate extrahepatic and central intrahepatic biliary ductal dilatation as well as moderate dilatation of the main pancreatic duct. No stone or mass is seen by CT. Consider further evaluation with nonemergent pancreas protocol MRI. 3. Moderate left pleural effusion. 4. Unchanged 1.1 cm ground-glass opacity of the right lung apex. Recommend follow-up chest CT in 6-12 months per Fleischner criteria. 5. Large volume stool throughout the colon.     Cta Abdomen Pelvis W Contrast    Result Date: 2/19/2021  EXAMINATION: CTA OF THE CHEST; CTA OF THE ABDOMEN AND PELVIS WITH CONTRAST 2/19/2021 3:05 pm TECHNIQUE: Dose modulation, iterative reconstruction, and/or weight based adjustment of the mA/kV was utilized to reduce the radiation dose to as low as reasonably achievable.; CTA of the chest was performed after the administration Adrenals are unremarkable. Spleen is normal in size. No renal calculi or hydronephrosis. Moderate calcific atherosclerosis. No significant iliac stenosis. GI/Bowel: Large volume stool throughout the colon. Bowel is non-dilated without wall thickening. Appendix is not seen though there are no secondary signs of appendicitis. Pelvis: Unremarkable. Peritoneum/Retroperitoneum:No free fluid, free air, organized fluid collection or lymphadenopathy. Bones: Diffuse osteopenia with multilevel degenerative disc disease. 1. Severe aortic valvular calcification. Ascending thoracic aorta measures 3.6 cm. No significant iliac vessel stenosis. 2. Moderate extrahepatic and central intrahepatic biliary ductal dilatation as well as moderate dilatation of the main pancreatic duct. No stone or mass is seen by CT. Consider further evaluation with nonemergent pancreas protocol MRI. 3. Moderate left pleural effusion. 4. Unchanged 1.1 cm ground-glass opacity of the right lung apex. Recommend follow-up chest CT in 6-12 months per Fleischner criteria. 5. Large volume stool throughout the colon. Cta Transcatheter Aortic Valve Replacement    Result Date: 2/19/2021  EXAMINATION: CTA OF THE CHEST; CTA OF THE ABDOMEN AND PELVIS WITH CONTRAST 2/19/2021 3:05 pm TECHNIQUE: Dose modulation, iterative reconstruction, and/or weight based adjustment of the mA/kV was utilized to reduce the radiation dose to as low as reasonably achievable.; CTA of the chest was performed after the administration of intravenous contrast.  Multiplanar reformatted images are provided for review. MIP images are provided for review. Dose modulation, iterative reconstruction, and/or weight based adjustment of the mA/kV was utilized to reduce the radiation dose to as low as reasonably achievable.; CTA of the abdomen and pelvis was performed with the administration of intravenous contrast. Multiplanar reformatted images are provided for review.   MIP images are provided for review. Dose modulation, iterative reconstruction, and/or weight based adjustment of the mA/kV was utilized to reduce the radiation dose to as low as reasonably achievable. COMPARISON: 01/25/2021 HISTORY: ORDERING SYSTEM PROVIDED HISTORY: Nodular calcific aortic valve stenosis TECHNOLOGIST PROVIDED HISTORY: Please include aortic valve calcium score What reading provider will be dictating this exam?->CRC; ORDERING SYSTEM PROVIDED HISTORY: Nodular calcific aortic valve stenosis TECHNOLOGIST PROVIDED HISTORY: TAVR Please include aortic valve calcium score Reason for exam:->severe aortic stenosis What reading provider will be dictating this exam?->CRC FINDINGS: Chest: Mediastinum: Moderate coronary artery calcification. No mediastinal, hilar, or axillary lymphadenopathy. Small pericardial effusion. Severe aortic valvular calcification. Ascending thoracic aorta measures 3.7 cm at the level of the mid ascending thoracic aorta. Lungs/pleura: Unchanged 1.1 cm ground-glass opacity of the right lung apex on series 8, image 39. Moderate left pleural effusion. Mild emphysema. Soft Tissues/Bones: Diffuse osteopenia with multilevel degenerative disc disease of the thoracolumbar spine. Abdomen/Pelvis: Organs: Liver is normal in contour and enhancement. Gallbladder is surgically absent. There is moderate extrahepatic and central intrahepatic biliary ductal dilatation as well as moderate dilatation of the main pancreatic duct. No obstructing mass or stone is seen. Adrenals are unremarkable. Spleen is normal in size. No renal calculi or hydronephrosis. Moderate calcific atherosclerosis. No significant iliac stenosis. GI/Bowel: Large volume stool throughout the colon. Bowel is non-dilated without wall thickening. Appendix is not seen though there are no secondary signs of appendicitis. Pelvis: Unremarkable. Peritoneum/Retroperitoneum:No free fluid, free air, organized fluid collection or lymphadenopathy.  Bones: Diffuse osteopenia with multilevel degenerative disc disease. 1. Severe aortic valvular calcification. Ascending thoracic aorta measures 3.6 cm. No significant iliac vessel stenosis. 2. Moderate extrahepatic and central intrahepatic biliary ductal dilatation as well as moderate dilatation of the main pancreatic duct. No stone or mass is seen by CT. Consider further evaluation with nonemergent pancreas protocol MRI. 3. Moderate left pleural effusion. 4. Unchanged 1.1 cm ground-glass opacity of the right lung apex. Recommend follow-up chest CT in 6-12 months per Fleischner criteria. 5. Large volume stool throughout the colon. ASSESSMENT:  82 y.o. female with intrahepatic and extrahepatic duct dilation, pancreatic duct dilation. Cytology from pericardial effusion concerning for possible adenocarcinoma. PLAN:    -Follow up ABD MRI  -Plan for EUS on 2/22    Plan discussed with Dr. John Dacosta    Electronically signed by Marium Roman DO on 2/21/21 at 12:10 AM [facility]    General Surgery Progress Note  [facility]    Patient's Name/Date of Birth: Jayson Guerra / 1935    Date: February 22, 2021     Surgeon: John Dacosta MD    Chief Complaint: Dilated intra and extrahepatic bile duct, ampullary versus pancreatic mass    Patient Active Problem List   Diagnosis    Anxiety    Migraine headache    Accidental drug overdose    Essential hypertension, benign    GERD (gastroesophageal reflux disease)    Dementia ([address]    Polypharmacy    Malnutrition ([address]    Acute delirium    Pericardial effusion    Pain syndrome, chronic    Critical aortic valve stenosis    Ampulla of Vater mass       Subjective:    Jayson Guerra is a 82 y.o. female who presents for evaluation of repeated syncopal episodes. Hernando First has a history of severe aortic stenosis and pericardial effusion.  Previous pericardial effusion cytology demonstrated possible adenocarcinoma.  Patient is being worked up for malignancy.  CT of the chest demonstrating pulmonary nodule.  CT abdomen pelvis demonstrating intra and extrahepatic ductal dilation without obvious mass appreciated. Juan Daniel Lapping is also pancreatic ductal dilation present.  Patient's last colonoscopy was 10 years ago which was normal.  Patient has an 8-year smoking. Cardiology is planning for TAVR. Liver enzymes are normal. MRI with and without contrast reviewed and showing possible ampullary mass.       Objective:  BP (!) 144/86   Pulse 83   Temp 96.3 °F (35.7 °C) (Temporal)   Resp 18   Ht 5' 8\" (1.727 m)   Wt 140 lb (63.5 kg)   SpO2 95%   BMI 21.29 kg/m²   Labs:  Recent Labs     02/19/21  1621 02/20/21  0722 02/21/21  0958   WBC 6.1 9.5 7.2   HGB 12.1 10.9* 12.7   HCT 40.5 34.7 42.8     Lab Results   Component Value Date    CREATININE 0.9 02/21/2021    BUN 21 02/21/2021     02/21/2021    K 4.1 02/21/2021     02/21/2021    CO2 31 (H) 02/21/2021     No results for input(s): LIPASE, AMYLASE in the last 72 hours.   CBC with Differential:    Lab Results   Component Value Date    WBC 7.2 02/21/2021    RBC 4.80 02/21/2021    HGB 12.7 02/21/2021    HCT 42.8 02/21/2021     02/21/2021    MCV 89.2 02/21/2021    MCH 26.5 02/21/2021    MCHC 29.7 02/21/2021    RDW 17.3 02/21/2021    SEGSPCT 72 02/22/2014    LYMPHOPCT 5.3 02/19/2021    MONOPCT 1.2 02/19/2021    BASOPCT 0.2 02/19/2021    MONOSABS 0.07 02/19/2021    LYMPHSABS 0.32 02/19/2021    EOSABS 0.00 02/19/2021    BASOSABS 0.01 02/19/2021     CMP:    Lab Results   Component Value Date     02/21/2021    K 4.1 02/21/2021     02/21/2021    CO2 31 02/21/2021    BUN 21 02/21/2021    CREATININE 0.9 02/21/2021    GFRAA >60 02/21/2021    LABGLOM 60 02/21/2021    GLUCOSE 86 02/21/2021    GLUCOSE 90 05/16/2012    PROT 7.1 02/21/2021    LABALBU 3.8 02/21/2021    LABALBU 4.2 05/16/2012    CALCIUM 9.4 02/21/2021    BILITOT 0.4 02/21/2021    ALKPHOS 65 02/21/2021    AST 13 02/21/2021    ALT 8 02/21/2021       General appearance:  NAD  Head: NCAT, PERRLA, EOMI, red conjunctiva  Neck: supple, no masses  Lungs: CTAB, equal chest rise bilateral  Heart: Reg rate  Abdomen: soft, nondistended, nontender  Skin; no lesions  Gu: no cva tenderness  Extremities: extremities normal, atraumatic, no cyanosis or edema      Assessment/Plan:  Antolin Leos is a 82 y.o. 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

## 2021-02-21 NOTE — PROGRESS NOTES
Inpatient Medical Oncology Consult Note    Subjective:  No fever. No nausea    Objective:  BP (!) 111/59   Pulse 72   Temp 98.3 °F (36.8 °C) (Temporal)   Resp 18   Ht 5' 8\" (1.727 m)   Wt 140 lb (63.5 kg)   SpO2 98%   BMI 21.29 kg/m²   GENERAL: Alert, oriented x 3, not in acute distress. HEENT: PERRLA; EOMI. Oropharynx clear. NECK: Supple. Without lymphadenopathy. LUNGS: Fair air entry lori  CARDIOVASCULAR: Regular rate. No murmurs, rubs or gallops. ABDOMEN: Soft. Non-tender, non-distended. Positive bowel sounds. EXTREMITIES: Without clubbing, cyanosis, or edema. NEUROLOGIC: No focal deficits. ECOG PS 1    Diagnostics:  Lab Results   Component Value Date    WBC 7.2 02/21/2021    HGB 12.7 02/21/2021    HCT 42.8 02/21/2021    MCV 89.2 02/21/2021     02/21/2021     Lab Results   Component Value Date     02/21/2021    K 4.1 02/21/2021     02/21/2021    CO2 31 (H) 02/21/2021    BUN 21 02/21/2021    CREATININE 0.9 02/21/2021    GLUCOSE 86 02/21/2021    CALCIUM 9.4 02/21/2021    PROT 7.1 02/21/2021    LABALBU 3.8 02/21/2021    BILITOT 0.4 02/21/2021    ALKPHOS 65 02/21/2021    AST 13 02/21/2021    ALT 8 02/21/2021    LABGLOM 60 02/21/2021    GFRAA >60 02/21/2021     Impression/Plan:  82 y. o. female history of dementia, GERD, hypertension, critical aortic stenosis who had recent admission in January 2021 for presyncope. Noted to have large pericardial effusion with tamponade physiology.    Emergent pericardiocentesis with 1.4 liters removed by CT surgery. Cytology suspicious for adenocarcinoma.       She was also noted to have critical AS.  Work-up for TAVR was started.      She was seen in cardiology clinic yesterday, had dyspnea on ambulation and intermittent lightheadedness on standing.      Emergent echocardiogram shows moderate circumferential pericardial effusion with no tamponade physiology.     CTA chest/abdomen/pelvis severe aortic calcifications  Moderate extrahepatic and central intrahepatic biliary ductal dilatation as well as moderate dilatation of the main pancreatic duct. Moderate left pleural effusion. Unchanged 1.1 cm ground glass opacity right lung apex.   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

## 2021-02-21 NOTE — PROGRESS NOTES
INPATIENT CARDIOLOGY FOLLOW-UP    Name: Suzi Fontaine    Age: 82 y.o. Date of Admission: 2/19/2021  3:47 PM    Date of Service: 2/21/2021    Chief Complaint: Follow-up for critical aortic stenosis    Interim History:  No new overnight cardiac complaints. Currently with no complaints of CP, respiratory distress, or palpitations at rest. SR on telemetry. Review of Systems:   Cardiac: As per HPI  General: No fever, chills  Pulmonary: As per HPI  HEENT: No visual disturbances, difficult swallowing  GI: No nausea, vomiting  : No dysuria, hematuria  Endocrine: No thyroid disease or DM  Musculoskeletal: GALVAN x 4, no focal motor deficits  Skin: Intact, no rashes  Neuro: No headache, seizures  Psych: Currently with no depression, anxiety    Problem List:  Patient Active Problem List   Diagnosis    Anxiety    Migraine headache    Accidental drug overdose    Essential hypertension, benign    GERD (gastroesophageal reflux disease)    Dementia (HCC)    Polypharmacy    Malnutrition (HCC)    Acute delirium    Pericardial effusion    Pain syndrome, chronic    Critical aortic valve stenosis       Allergies:   Allergies   Allergen Reactions    Other Rash     \"A type of IVP dye from the 50's\"       Current Medications:  Current Facility-Administered Medications   Medication Dose Route Frequency Provider Last Rate Last Admin    polyethylene glycol (GLYCOLAX) packet 17 g  17 g Oral Daily Jeffery Graham MD   17 g at 02/21/21 6595    docusate sodium (COLACE) capsule 100 mg  100 mg Oral BID Jeffery Graham MD   100 mg at 02/21/21 0483    bisacodyl (DULCOLAX) EC tablet 5 mg  5 mg Oral Daily PRN Jeffery Graham MD        perflutren lipid microspheres (DEFINITY) injection 1.65 mg  1.5 mL Intravenous ONCE PRN GEOVANNY Sutton        venlafaxine [facility]) tablet 75 mg  75 mg Oral Daily Phil Valero MD   75 mg at 02/21/21 8738    sodium chloride flush 0.9 % injection 10 mL  10 mL Intravenous 2 times per day Tiffanie Joiner MD   10 mL at 02/21/21 0937    sodium chloride flush 0.9 % injection 10 mL  10 mL Intravenous PRN Tiffanie Joiner MD        enoxaparin (LOVENOX) injection 40 mg  40 mg Subcutaneous Daily Tiffanie Joiner MD   40 mg at 02/21/21 0937    promethazine (PHENERGAN) tablet 12.5 mg  12.5 mg Oral Q6H PRN Tiffanie Joinre MD        Or    ondansetron (ZOFRAN) injection 4 mg  4 mg Intravenous Q6H PRN Tiffanie Joiner MD        acetaminophen (TYLENOL) tablet 650 mg  650 mg Oral Q6H PRN Tiffanie Joiner MD   650 mg at 02/20/21 2010    Or    acetaminophen (TYLENOL) suppository 650 mg  650 mg Rectal Q6H PRN Tiffanie Joiner MD        potassium chloride (KLOR-CON M) extended release tablet 40 mEq  40 mEq Oral PRN Tiffanie Joiner MD        Or    potassium bicarb-citric acid (EFFER-K) effervescent tablet 40 mEq  40 mEq Oral PRN Tiffanie Joiner MD        Or    potassium chloride 10 mEq/100 mL IVPB (Peripheral Line)  10 mEq Intravenous PRN Tiffanie Joiner MD        magnesium sulfate 2000 mg in 50 mL IVPB premix  2,000 mg Intravenous PRN Tiffanie Joiner MD        [Held by provider] 0.9 % sodium chloride infusion   Intravenous Continuous Todd Jhoan, DO 75 mL/hr at 02/20/21 0542 New Bag at 02/20/21 0542    meclizine (ANTIVERT) tablet 12.5 mg  12.5 mg Oral TID PRN Tiffanie Joiner MD   12.5 mg at 02/20/21 2135      [Held by provider] sodium chloride 75 mL/hr at 02/20/21 0542       Physical Exam:  /76   Pulse 82   Temp 98.6 °F (37 °C) (Temporal)   Resp 18   Ht 5' 8\" (1.727 m)   Wt 140 lb (63.5 kg)   SpO2 96%   BMI 21.29 kg/m²   Wt Readings from Last 3 Encounters:   02/19/21 140 lb (63.5 kg)   02/19/21 140 lb (63.5 kg)   01/29/21 151 lb 3.2 oz (68.6 kg)     Appearance: Awake, alert, no acute respiratory distress  Skin: Intact, no rash  Head: Normocephalic, atraumatic  Eyes: EOMI, no conjunctival erythema  ENMT: No pharyngeal erythema, MMM, no rhinorrhea  Neck: Supple, no elevated JVP, no carotid bruits  Lungs: Decreased BS B/L, no wheezing  Cardiac: Regular rate and rhythm, 3/6 late peaking harsh JOZEF > RUSB  Abdomen: Soft, nontender, +bowel sounds  Extremities: Moves all extremities x 4, no lower extremity edema  Neurologic: No focal motor deficits apparent, normal mood and affect    Intake/Output:    Intake/Output Summary (Last 24 hours) at 2/21/2021 1448  Last data filed at 2/20/2021 1527  Gross per 24 hour   Intake 380 ml   Output --   Net 380 ml     No intake/output data recorded.     Laboratory Tests:  Recent Labs     02/19/21 2127 02/20/21  0722 02/21/21  0958    139 141   K 4.2 3.8 4.1    103 104   CO2 21* 28 31*   BUN 32* 27* 21   CREATININE 1.0 0.9 0.9   GLUCOSE 263* 133* 86   CALCIUM 9.4 9.2 9.4     Lab Results   Component Value Date    MG 2.2 02/19/2021     Recent Labs     02/19/21 2127 02/20/21  0722 02/21/21  0958   ALKPHOS 71 65 65   ALT 8 8 8   AST 13 13 13   PROT 7.4 6.7 7.1   BILITOT 0.3 0.3 0.4   LABALBU 3.9 3.8 3.8     Recent Labs     02/19/21 1621 02/20/21  0722 02/21/21  0958   WBC 6.1 9.5 7.2   RBC 4.65 3.99 4.80   HGB 12.1 10.9* 12.7   HCT 40.5 34.7 42.8   MCV 87.1 87.0 89.2   MCH 26.0 27.3 26.5   MCHC 29.9* 31.4* 29.7*   RDW 16.9* 16.9* 17.3*    191 217   MPV 11.9 11.7 11.9     Lab Results   Component Value Date    CKTOTAL 101 12/25/2014    CKMB 14.1 (H) 05/21/2014    TROPONINI <0.01 02/20/2021    TROPONINI <0.01 02/19/2021    TROPONINI <0.01 02/19/2021     Lab Results   Component Value Date    INR 1.1 02/19/2021    INR 1.5 01/24/2021    INR 1.2 06/24/2011    PROTIME 12.0 02/19/2021    PROTIME 16.4 (H) 01/24/2021    PROTIME 11.5 06/24/2011     Lab Results   Component Value Date    TSH 1.770 01/25/2021     No results found for: LABA1C  No results found for: EAG  Lab Results   Component Value Date    CHOL 216 (H) 02/27/2019    CHOL 244 (H) 07/19/2016    CHOL 241 (H) 02/22/2014     Lab Results   Component Value Date    TRIG 63 02/27/2019    TRIG 66 07/19/2016    TRIG 53 02/22/2014     Lab Results   Component Value Date    HDL 63 02/27/2019    HDL 87 07/19/2016    HDL 92 02/22/2014     Lab Results   Component Value Date    LDLCALC 140 (H) 02/27/2019    LDLCALC 144 (H) 07/19/2016    LDLCALC 138 (H) 02/22/2014     Lab Results   Component Value Date    LABVLDL 13 02/27/2019    LABVLDL 13 07/19/2016     No results found for: [address]     02/19/21  1621   PROBNP 7,656*       Cardiac Tests:  Telemetry reviewed (date: 2/21/2021): SR, rate 60's    Limited echocardiogram: 2/19/21   Normal left ventricle size. Estimated left ventricle ejection fraction   65+/-5 %.   Normal right ventricular size and function.   Moderate circumferential pericardial effusion. There is no RA/RV collapse to suggest tamponade. IVC is normal size. MRI abdomen: 2/21/2021  1. Moderate to severe extrahepatic and mild diffuse intrahepatic biliary   ductal dilatation is again demonstrated without obstructing stone seen.  This   is accompanied by mild pancreatic ductal dilatation. [place] is apparent   fullness of the ampulla which does raise the possibility of a small ampullary   mass. 2. Moderate left pleural effusion. 3. Small pericardial effusion. 4. Complicated cystic lesion of the lower pole of the right kidney measuring   1.5 cm, Bosniak 3 equivalent.  If not intervened upon, recommend follow-up   renal mass protocol MRI in 6 months. 5. 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 Recommend follow-up pancreas protocol MRI in 2 years. ASSESSMENT / PLAN:  1. Critical symptomatic aortic stenosis  2. Large pericardial effusion status-post pericardiocentesis of approximately 1400 cc of fluid by Dr. Coleman Saldana January 25, 2021. Cytology resulted with findings \"suspicious, but not diagnostic of, adenocarcinoma\".   Now with moderate circumferential pericardial effusion on repeat limited echocardiogram this admission with no findings suggestive of tamponade physiology. 3. Moderate sized left pleural effusion  4. Hypertension -- controlled. 5. Hypercholesterolemia  6. Former tobacco smoker. 7. Anemia -- stable  8.  Ampullary mass     - Currently with no distress at rest  - Hold further IV fluids and continue to hold anti-HTN agents / renal function and hemodynamics are stable  - Telemetry reviewed (SR)  - TAVR CTAs completed / results reviewed and discussed with Dr. Eric Cornejo is for cardiac catheterization tomorrow (2/22/21) and aortic valve intervention shortly thereafter this coming week  - Heme/onc, surgery, and pulmonary recommendations reviewed --> would recommend proceeding with cardiac work-up 1st (+critical aortic stenosis)      Rochelle Solis MD  [facility] [place] ([facility]) Cardiology

## 2021-02-21 NOTE — PROGRESS NOTES
[facility] SURGERY  DAILY PROGRESS NOTE  2/21/2021    Subjective:  No acute events overnight   NPO today for MRI  Mild abdominal pian this morning   Nausea and vomiting overnight  Tumor markers are still pending     Objective:  /70   Pulse 75   Temp 97.8 °F (36.6 °C) (Temporal)   Resp 20   Ht 5' 8\" (1.727 m)   Wt 140 lb (63.5 kg)   SpO2 95%   BMI 21.29 kg/m²     GENERAL:  Laying in bed, awake, alert, cooperative, in mild apparent distress  HEAD: Normocephalic, atraumatic  EYES: No sclera icterus, pupils equal  LUNGS:  No increased work of breathing, on room air  CARDIOVASCULAR:  RR  ABDOMEN:  Soft, mildly  tender, non-distended  EXTREMITIES: No edema or swelling  SKIN: Warm and dry    Assessment/Plan:  82 y.o. female  with intrahepatic and extrahepatic duct dilation, pancreatic duct dilation. Cytology from pericardial effusion concerning for possible adenocarcinoma.     Overall care per primary  NPO for MRI, after diet can be restarted   NPO at MN due to EUS tomorrow   Pain control  Awaiting tumors markers Chromo A, CEA, CA 19-9   Anti- nausea control     Electronically signed by Meaghan Khan DO on 2/21/2021 at 8:20 AM

## 2021-02-21 NOTE — PROGRESS NOTES
Hospitalist Progress Note      PCP: Jeffery Tijerina DO    Date of Admission: 2/19/2021    Hospital Course: \"82 y.o. female history of dementia, GERD, hypertension, critical aortic stenosis who was sent from Dr. Kamini Bertrand clinic for evaluation of presyncope symptoms. The patient had recent admission in January 2021 for presyncope. He was noted to have large pericardial effusion with tamponade physiology. She had emergent pericardiocentesis with 1.4 liters removed by CT surgery. Cytology suspicious for adenocarcinoma. She was also noted to have critical AS. Work-up for TAVR was to be started. She was seen in cardiology clinic today for follow-up. Patient states she has dyspnea on ambulation. She has intermittent lightheadedness on standing. She has dementia. Some of the history is collaborated from her son Kaela Perdomo. In ER, BP was 108/60 on arrival.  Lab work is pertinent for BNP 7256, troponin <0.01. CTA chest/abdomen/pelvis pending. Emergent echocardiogram shows moderate circumferential pericardial effusion with no tamponade physiology. \"       Subjective:   She is anxious at bedside. No n/v. She has good appetite. She denies chest pain or dyspnea.     Medications:  Reviewed    Infusion Medications    [Held by provider] sodium chloride 75 mL/hr at 02/20/21 0542     Scheduled Medications    polyethylene glycol  17 g Oral Daily    docusate sodium  100 mg Oral BID    venlafaxine  75 mg Oral Daily    sodium chloride flush  10 mL Intravenous 2 times per day    enoxaparin  40 mg Subcutaneous Daily     PRN Meds: bisacodyl, perflutren lipid microspheres, sodium chloride flush, promethazine **OR** ondansetron, acetaminophen **OR** acetaminophen, potassium chloride **OR** potassium alternative oral replacement **OR** potassium chloride, magnesium sulfate, meclizine      Intake/Output Summary (Last 24 hours) at 2/21/2021 1426  Last data filed at 2/20/2021 1527  Gross per 24 hour   Intake 380 ml   Output --   Net 380 ml       Physical Exam Performed:    /76   Pulse 82   Temp 98.6 °F (37 °C) (Temporal)   Resp 18   Ht 5' 8\" (1.727 m)   Wt 140 lb (63.5 kg)   SpO2 96%   BMI 21.29 kg/m²   General appearance:  No apparent distress, appears stated age and cooperative. Elderly female. HEENT:  Normal cephalic, atraumatic without obvious deformity. Pupils equal, round, and reactive to light. Extra ocular muscles intact. Conjunctivae/corneas clear. Neck: Supple, with full range of motion. No jugular venous distention. Trachea midline. Respiratory:  Normal respiratory effort. Diminished sounds at bases. Cardiovascular:  Regular rate and rhythm with normal S1/S2 ,+ murmurs, rubs or gallops. Abdomen: Soft, non-tender, non-distended with normal bowel sounds. Musculoskeletal:  No clubbing, cyanosis or edema bilaterally. Full range of motion without deformity. Skin: Skin color, texture, turgor normal.  No rashes or lesions. Neurologic:  Neurovascularly intact without any focal sensory/motor deficits.  Cranial nerves: II-XII intact, grossly non-focal.  Psychiatric:  Alert and oriented  X 2         Labs:   Recent Labs     02/19/21 1621 02/20/21  0722 02/21/21  0958   WBC 6.1 9.5 7.2   HGB 12.1 10.9* 12.7   HCT 40.5 34.7 42.8    191 217     Recent Labs     02/19/21 2127 02/20/21  0722 02/21/21  0958    139 141   K 4.2 3.8 4.1    103 104   CO2 21* 28 31*   BUN 32* 27* 21   CREATININE 1.0 0.9 0.9   CALCIUM 9.4 9.2 9.4     Recent Labs     02/19/21 2127 02/20/21  0722 02/21/21  0958   AST 13 13 13   ALT 8 8 8   BILITOT 0.3 0.3 0.4   ALKPHOS 71 65 65     Recent Labs     02/19/21  1621   INR 1.1     Recent Labs     02/19/21  1621 02/19/21 2127 02/20/21  0014   TROPONINI <0.01 <0.01 <0.01       Urinalysis:      Lab Results   Component Value Date    NITRU Negative 01/24/2021    WBCUA 0-1 01/24/2021    WBCUA 0-1 06/24/2011    BACTERIA NONE SEEN 01/24/2021    RBCUA 0-1 01/24/2021    RBCUA NONE 06/24/2011 BLOODU SMALL 01/24/2021    SPECGRAV <=1.005 01/24/2021    GLUCOSEU Negative 01/24/2021    GLUCOSEU 100 06/24/2011       Radiology:  MRI ABDOMEN W WO CONTRAST MRCP   Final Result   1. Moderate to severe extrahepatic and mild diffuse intrahepatic biliary   ductal dilatation is again demonstrated without obstructing stone seen. This   is accompanied by mild pancreatic ductal dilatation. There is apparent   fullness of the ampulla which does raise the possibility of a small ampullary   mass. 2. Moderate left pleural effusion. 3. Small pericardial effusion. 4. Complicated cystic lesion of the lower pole of the right kidney measuring   1.5 cm, Bosniak 3 equivalent. If not intervened upon, recommend follow-up   renal mass protocol MRI in 6 months. 5. 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 Recommend follow-up pancreas protocol MRI in 2 years. XR CHEST PORTABLE   Final Result   Enlargement of the cardiac silhouette and left pleural effusion are overall   similar to the previous chest x-ray from 01/25/2021, status post interval   removal of the left chest tube. Assessment/Plan:    Active Hospital Problems    Diagnosis    Critical aortic valve stenosis [I35.0]       Presyncope-likely related to severe AS and possibly vertigo  -She has known critical AS. No tamponade noted. Cardiology will be evaluating for TAVR. -Fall precautions  -Telemetry  -PT/OT   -As needed meclizine     Adenocarcinoma  -No definitive malignancy seen on CT chest/Abdo/pelvis  -Oncology workup noted. Pulmonology consulted for nodule.     Large pericardial effusion with tamponade s/p pericardiocentesis 1/25/21  -Repeat echocardiogram shows moderate pericardial effusion with no tamponade physiology  -As per cardiology     Critical aortic stenosis  -TAVR planned by Cardiology  -Left heart cath planned on Monday.     Dilated intra and extrahepatic ducts  -MRCP suggests ampulla mass  -Surgical Oncology recommends Chromogranin A, Endscopic Surgery consult for EUS. Plan for EUS tomorrow. Bosniak 3 right kidney cyst  -Urology consultation for outpatient follow up.     Dementia  -Not on any medication.  -Delirium precautions     Constipation  -Continue stool softeners and laxatives       DVT Prophylaxis: Lovenox  Diet: DIET CARDIAC;   Diet NPO, After Midnight  Code Status: Full Code    PT/OT Eval Status: Ordered ordered    Dispo -pending work-up    Petra Samuels MD

## 2021-02-22 LAB
ALBUMIN SERPL-MCNC: 4 G/DL (ref 3.5–5.2)
ALP BLD-CCNC: 67 U/L (ref 35–104)
ALT SERPL-CCNC: 8 U/L (ref 0–32)
ANION GAP SERPL CALCULATED.3IONS-SCNC: 12 MMOL/L (ref 7–16)
AST SERPL-CCNC: 11 U/L (ref 0–31)
BILIRUB SERPL-MCNC: 0.4 MG/DL (ref 0–1.2)
BUN BLDV-MCNC: 26 MG/DL (ref 8–23)
CALCIUM SERPL-MCNC: 9.2 MG/DL (ref 8.6–10.2)
CHLORIDE BLD-SCNC: 101 MMOL/L (ref 98–107)
CO2: 23 MMOL/L (ref 22–29)
CREAT SERPL-MCNC: 0.8 MG/DL (ref 0.5–1)
GFR AFRICAN AMERICAN: >60
GFR NON-AFRICAN AMERICAN: >60 ML/MIN/1.73
GLUCOSE BLD-MCNC: 291 MG/DL (ref 74–99)
HCT VFR BLD CALC: 42.7 % (ref 34–48)
HEMOGLOBIN: 13.4 G/DL (ref 11.5–15.5)
MCH RBC QN AUTO: 27 PG (ref 26–35)
MCHC RBC AUTO-ENTMCNC: 31.4 % (ref 32–34.5)
MCV RBC AUTO: 85.9 FL (ref 80–99.9)
PDW BLD-RTO: 17.2 FL (ref 11.5–15)
PLATELET # BLD: 247 E9/L (ref 130–450)
PMV BLD AUTO: 11.4 FL (ref 7–12)
POTASSIUM REFLEX MAGNESIUM: 4.4 MMOL/L (ref 3.5–5)
RBC # BLD: 4.97 E12/L (ref 3.5–5.5)
SODIUM BLD-SCNC: 136 MMOL/L (ref 132–146)
TOTAL PROTEIN: 7.5 G/DL (ref 6.4–8.3)
WBC # BLD: 8.2 E9/L (ref 4.5–11.5)

## 2021-02-22 PROCEDURE — 97530 THERAPEUTIC ACTIVITIES: CPT

## 2021-02-22 PROCEDURE — C1769 GUIDE WIRE: HCPCS

## 2021-02-22 PROCEDURE — C1725 CATH, TRANSLUMIN NON-LASER: HCPCS

## 2021-02-22 PROCEDURE — B2111ZZ FLUOROSCOPY OF MULTIPLE CORONARY ARTERIES USING LOW OSMOLAR CONTRAST: ICD-10-PCS | Performed by: INTERNAL MEDICINE

## 2021-02-22 PROCEDURE — 36415 COLL VENOUS BLD VENIPUNCTURE: CPT

## 2021-02-22 PROCEDURE — C1894 INTRO/SHEATH, NON-LASER: HCPCS

## 2021-02-22 PROCEDURE — 6370000000 HC RX 637 (ALT 250 FOR IP): Performed by: INTERNAL MEDICINE

## 2021-02-22 PROCEDURE — 97535 SELF CARE MNGMENT TRAINING: CPT

## 2021-02-22 PROCEDURE — 2060000000 HC ICU INTERMEDIATE R&B

## 2021-02-22 PROCEDURE — 4A023N7 MEASUREMENT OF CARDIAC SAMPLING AND PRESSURE, LEFT HEART, PERCUTANEOUS APPROACH: ICD-10-PCS | Performed by: INTERNAL MEDICINE

## 2021-02-22 PROCEDURE — 93454 CORONARY ARTERY ANGIO S&I: CPT | Performed by: INTERNAL MEDICINE

## 2021-02-22 PROCEDURE — 80053 COMPREHEN METABOLIC PANEL: CPT

## 2021-02-22 PROCEDURE — 2709999900 HC NON-CHARGEABLE SUPPLY

## 2021-02-22 PROCEDURE — 97166 OT EVAL MOD COMPLEX 45 MIN: CPT

## 2021-02-22 PROCEDURE — 85027 COMPLETE CBC AUTOMATED: CPT

## 2021-02-22 PROCEDURE — 6360000002 HC RX W HCPCS

## 2021-02-22 PROCEDURE — 2500000003 HC RX 250 WO HCPCS

## 2021-02-22 PROCEDURE — 93454 CORONARY ARTERY ANGIO S&I: CPT

## 2021-02-22 PROCEDURE — 6370000000 HC RX 637 (ALT 250 FOR IP): Performed by: PHYSICIAN ASSISTANT

## 2021-02-22 PROCEDURE — 99233 SBSQ HOSP IP/OBS HIGH 50: CPT | Performed by: INTERNAL MEDICINE

## 2021-02-22 PROCEDURE — 2580000003 HC RX 258: Performed by: INTERNAL MEDICINE

## 2021-02-22 PROCEDURE — 99232 SBSQ HOSP IP/OBS MODERATE 35: CPT | Performed by: TRANSPLANT SURGERY

## 2021-02-22 RX ORDER — ACETAMINOPHEN 325 MG/1
650 TABLET ORAL EVERY 4 HOURS PRN
Status: DISCONTINUED | OUTPATIENT
Start: 2021-02-22 | End: 2021-03-02 | Stop reason: HOSPADM

## 2021-02-22 RX ORDER — SODIUM CHLORIDE 0.9 % (FLUSH) 0.9 %
10 SYRINGE (ML) INJECTION EVERY 12 HOURS SCHEDULED
Status: DISCONTINUED | OUTPATIENT
Start: 2021-02-22 | End: 2021-02-23 | Stop reason: SDUPTHER

## 2021-02-22 RX ORDER — SODIUM CHLORIDE 0.9 % (FLUSH) 0.9 %
10 SYRINGE (ML) INJECTION PRN
Status: DISCONTINUED | OUTPATIENT
Start: 2021-02-22 | End: 2021-02-23 | Stop reason: SDUPTHER

## 2021-02-22 RX ADMIN — SODIUM CHLORIDE, PRESERVATIVE FREE 10 ML: 5 INJECTION INTRAVENOUS at 20:05

## 2021-02-22 RX ADMIN — PREDNISONE 50 MG: 20 TABLET ORAL at 06:54

## 2021-02-22 RX ADMIN — DOCUSATE SODIUM 100 MG: 100 CAPSULE, LIQUID FILLED ORAL at 20:05

## 2021-02-22 RX ADMIN — PREDNISONE 50 MG: 20 TABLET ORAL at 00:42

## 2021-02-22 RX ADMIN — DIPHENHYDRAMINE HYDROCHLORIDE 50 MG: 25 TABLET ORAL at 06:57

## 2021-02-22 RX ADMIN — MECLIZINE 12.5 MG: 12.5 TABLET ORAL at 20:57

## 2021-02-22 ASSESSMENT — PAIN SCALES - GENERAL
PAINLEVEL_OUTOF10: 0
PAINLEVEL_OUTOF10: 0

## 2021-02-22 NOTE — PROGRESS NOTES
INPATIENT MEDICAL ONCOLOGY PROGRESS NOTE    SUBJECTIVE:    Cardiac cath today  Afebrile, c/o chronic back pain     OBJECTIVE  Physical Exam:  VITALS:  BP (!) 144/86   Pulse 83   Temp 96.3 °F (35.7 °C) (Temporal)   Resp 18   Ht 5' 8\" (1.727 m)   Wt 140 lb (63.5 kg)   SpO2 95%   BMI 21.29 kg/m²   ENT:  oropharynx clear, no evidence of mucositis. NECK:  No  lymphadenopathy. HEMATOLOGIC/LYMPHATICS:  No abnormal lymphadenopathy. LUNGS:  Clear to auscultation, room air    CARDIOVASCULAR:  Regular rate and rhythm. No clicks, murmurs, rubs or gallops. ABDOMEN:  Soft, nontender. No ascites. No mass or organomegaly. NEUROLOGIC:  No focal deficits. SKIN:  No rash. EXTREMITIES: without clubbing, cyanosis, or edema. DIAGNOSTIC DATA  Labs:    Lab Results   Component Value Date    WBC 7.2 02/21/2021    HGB 12.7 02/21/2021    HCT 42.8 02/21/2021    MCV 89.2 02/21/2021     02/21/2021     Lab Results   Component Value Date    CEA 7.0 (H) 02/21/2021     IMPRESSION:  Patient Active Problem List   Diagnosis    Anxiety    Migraine headache    Accidental drug overdose    Essential hypertension, benign    GERD (gastroesophageal reflux disease)    Dementia (HCC)    Polypharmacy    Malnutrition (HCC)    Acute delirium    Pericardial effusion    Pain syndrome, chronic    Critical aortic valve stenosis    Ampulla of Vater mass     82 y. o. female history of dementia, GERD, hypertension, critical aortic stenosis who had recent admission in January 2021 for presyncope. Noted to have large pericardial effusion with tamponade physiology.    Emergent pericardiocentesis with 1.4 liters removed by CT surgery. Cytology suspicious for adenocarcinoma.       She was also noted to have critical AS.  Work-up for TAVR was started.      She was seen in cardiology clinic yesterday, had dyspnea on ambulation and intermittent lightheadedness on standing.      Emergent echocardiogram shows moderate circumferential pericardial effusion with no tamponade physiology.     CTA chest/abdomen/pelvis severe aortic calcifications  Moderate extrahepatic and central intrahepatic biliary ductal dilatation as well as moderate dilatation of the main pancreatic duct. Moderate left pleural effusion.  Unchanged 1.1 cm ground glass opacity right lung apex    PLAN:  -Cardiology consulted, Cath today plan for TAVR Wednesday   -MRI abdomen-ampullary neoplasm  -EUS cancelled, reschedule after cardiac work up   [employer], currently stable 12.7/42.8 plt: 217 transfuse hgb<7 plt<10   -Will continue to follow    [address]   [phone]    The patient was seen and examined, I agree with HAIDER Ballesteros-CNP  findings, assessment and plan as outlined.   02/22/2021  Keri Garcia MD

## 2021-02-22 NOTE — CARE COORDINATION
Spoke with Ondina Worthington at [facility], confirmed patient is not a bedhold there but they are planning to take her back at discharge. Patient will require therapy evals and a precert prior to discharge. Have placed envelope and ambulance form on soft chart.

## 2021-02-22 NOTE — PROGRESS NOTES
ENDOSCOPIC SURGERY  DAILY PROGRESS NOTE  2/22/2021    Subjective:  No acute events overnight   States she has minor back pain that is chronic in nature   Cardiology following-  critical aortic stenosis  No acute distress  MRI- fullness of the ampulla possible mass with dilation in hepatic and pancreatic ducts      Objective:  BP (!) 111/59   Pulse 72   Temp 98.3 °F (36.8 °C) (Temporal)   Resp 18   Ht 5' 8\" (1.727 m)   Wt 140 lb (63.5 kg)   SpO2 98%   BMI 21.29 kg/m²     GENERAL:  Laying in bed, awake, alert, cooperative, no apparent distress  HEAD: Normocephalic  EYES: No sclera icterus, pupils equal  LUNGS:  No increased work of breathing  CARDIOVASCULAR:  RR  ABDOMEN:  Soft, non-tender, non-distended  EXTREMITIES: No edema or swelling  SKIN: Warm and dry    Assessment/Plan:  82 y.o. female  with intrahepatic and extrahepatic duct dilation, pancreatic duct dilation.  Cytology from pericardial effusion concerning for possible adenocarcinoma.     Overall care per primary   Discussion with Dr. Andrea Carter yesterday evening will hold off EUS until cardiac work-up is complete  Going for a cardiac cath today 2/22  Pain control  Awaiting tumors markers Chromo A, CEA, CA 19-9   Anti- nausea control       Electronically signed by Roxy Dobson DO on 2/22/2021 at 6:45 AM

## 2021-02-22 NOTE — CONSULTS
INPATIENT CONSULTATION RECORD FOR  2/22/2021      [facility]  [address] [place], [address] [facility]  [phone]        REASON FOR CONSULTATION:      Bosniak 3 cyst    HISTORY OF PRESENT ILLNESS:      The patient is a 82 y.o. female patient who was found to have a Bosniak 3 cyst on MRI. CT was also performed. Patient is to undergo cardiac catheterization I saw her in the cardiac catheter lab in the preop area. She has no hematuria. She has no right flank pain. She has no history of renal cell carcinoma.     Past Medical History:   Diagnosis Date    Accidental drug overdose 5/21/2014    Acute delirium 12/26/2014    Anxiety     Arthritis     Dizziness, nonspecific     Generalized headaches     Heart murmur     stable per pt    Hypertension     Knee pain     right    Malnutrition ([address] 12/26/2014    Migraine headache     Polypharmacy 12/26/2014    Reflux gastritis     Rhabdomyolysis 5/21/2014    Sinus problem          Past Surgical History:   Procedure Laterality Date    APPENDECTOMY      BACK SURGERY  2007    lumbar    CHOLECYSTECTOMY      HYSTERECTOMY      PERICARDIUM SURGERY N/A 1/25/2021    PERICARDIOCENTESIS performed by Maria Gillespie MD at [address] [place] [address]  9/17/2012    right       Medications Prior to Admission:    Medications Prior to Admission: diphenhydrAMINE HCl (BENADRYL ALLERGY PO), Take by mouth  predniSONE (DELTASONE) 50 MG tablet, Take 50 mg by mouth daily  acetaminophen (TYLENOL) 500 MG tablet, Take 650 mg by mouth every 6 hours as needed for Pain  bisacodyl (DULCOLAX) 10 MG suppository, Place 1 suppository rectally daily as needed for Constipation  sennosides-docusate sodium (SENOKOT-S) 8.6-50 MG tablet, Take 1 tablet by mouth 2 times daily  Cholecalciferol (VITAMIN D3) 1.25 MG (42914 UT) CAPS, Take by mouth once a week  venlafaxine (EFFEXOR) 37.5 MG tablet, Take 75 mg by mouth daily   aspirin 325 MG EC tablet, Take 1 tablet by mouth organisms   01/24/2021           ASSESSMENT / PLAN:      1. Josiane 3 renal cyst incidentally found on CT / MRI. I discussed with Erendira Jayafreya that this is very unlikely. I do not recommend intervention at this time. I recommend that she follow up in 6 months with an enhanced renal ultrasound to assess for change. Please call if further questions or concerns.        Tika Alvarez M.D.  7:09 AM  2/22/2021

## 2021-02-22 NOTE — PROGRESS NOTES
[facility] SURGERY  DAILY PROGRESS NOTE  2/22/2021    CC: weightloss    Subjective:  No acute events overnight   No acute events overnight   States she has minor back pain that is chronic in nature   Cardiology following-  critical aortic stenosis  No acute distress  MRI- fullness of the ampulla possible mass with dilation in hepatic and pancreatic ducts    Still NPO    Objective:  BP (!) 144/86   Pulse 83   Temp 96.3 °F (35.7 °C) (Temporal)   Resp 18   Ht 5' 8\" (1.727 m)   Wt 140 lb (63.5 kg)   SpO2 95%   BMI 21.29 kg/m²     GENERAL:  Laying in bed, awake, alert, cooperative, in mild apparent distress  HEAD: Normocephalic  EYES: No sclera icterus  LUNGS:  No increased work of breathing, on room air  CARDIOVASCULAR:  RR  ABDOMEN:  Soft, non- tender, non-distended  EXTREMITIES: No edema or swelling  SKIN: Warm and dry    Assessment/Plan:  82 y.o. female  with intrahepatic and extrahepatic duct dilation, pancreatic duct dilation. Cytology from pericardial effusion concerning for possible adenocarcinoma. Overall care per primary   Discussion with Dr. Sharyle Blazing yesterday evening will hold off EUS until cardiac work-up is complete  Going for a cardiac cath today 2/22  Pain control  Awaiting tumors markers Chromo A, CEA, CA 19-9   Anti- nausea control        Electronically signed by Adwoa Boateng DO on 2/22/2021 at 6:51 AM     Agree with above, if patient can not have anesthesia Cardiology will need to explain this to the patient.     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

## 2021-02-22 NOTE — PROGRESS NOTES
OCCUPATIONAL THERAPY INITIAL EVALUATION      Date:2021  Patient Name: Antolin Leos  MRN: [id number]  : 1935  Room: [address] [place] [address]    Referring Provider: Dorothea Gonzales MD    Evaluating OT: Carin Cesar OTR/L #[id number]    AM-PAC Daily Activity Raw Score:     Recommended Adaptive Equipment/DME: To be further assessed      Diagnosis:    1. Critical aortic valve stenosis    2. Pericardial effusion         Pertinent Medical History:   Past Medical History:   Diagnosis Date    Accidental drug overdose 2014    Acute delirium 2014    Anxiety     Arthritis     Dizziness, nonspecific     Generalized headaches     Heart murmur     stable per pt    Hyperlipidemia     Hypertension     Knee pain     right    Malnutrition ([address] 2014    Migraine headache     Pericardial effusion 2021    Polypharmacy 2014    Reflux gastritis     Rhabdomyolysis 2014    Sinus problem       Precautions:  Falls, bed alarm, cognition, blurred vision     Home Living:  Prior 2021 pt lived alone in a 2 story home. Pt is currently from [facility]  Prior Level of Function:  Poor historian d/t confusion. Per pt she was indep  with ADLs , and   with IADLs; using ww for ambulation. Pain Level: none  Cognition: A&O: self, hospital. Confused to month and year, \"I don't know\"   Follows 2 step directions: fair    Memory:  poor   Sequencing:  poor   Problem solving:  poor   Judgement/safety:  poor     Functional Assessment:   Initial Eval Status  Date: 21 Treatment Status  Date: STG=LTG  Time frame: 5-7 days   Feeding Moderate Assist   Required set up and frequent cues for location of food and postioning of food in front of her. Poor problem solving  Supervision    Grooming Moderate Assist   To wash hands at sink.  Poor problem solving /safety  Stand by Assist    UB Dressing Minimal Assist   Doff/greer hospital gown  Supervision    LB Dressing Moderate Assist   To doff/greer continued skilled OT to increase independence with  ADLs, functional mobility,  safety,  and quality of life. Treatment: OT treatment provided this date includes:    ADL-  Instruction/training on safety and adapted techniques for completion of ADLs:frequent cues for problem solving during dressing, bathing, grooming at sink and feeding.   Mobility-  Instruction/training on safety and improved independence with /functional transfers  and functional mobility with ww. Cues for safety. Assessment of current deficits   Functional mobility [x]  ADLs [x] Strength [x]  Cognition [x]  Functional transfers  [x] IADLs [] Safety Awareness [x]  Endurance [x]  Fine Motor Coordination [] Balance [x] Vision/perception [] Sensation []   Gross Motor Coordination [] ROM [] Delirium []                  Motor Control []    Plan of Care: 1-3 days/wk for 1-2 weeks PRN     Instruction/training on adapted ADL techniques and AE recommendations to increase functional independence within precautions  Functional transfer/mobility training/DME recommendations for increased independence, safety, and fall prevention  Patient/Family education to increase follow through with safety techniques and functional independence  Cognitive retraining/development of therapeutic activities to improve problem solving, judgement, memory, and attention for increased safety/participation in ADL/IADL tasks  Therapeutic exercise to improve motor endurance, ROM, and functional strength for ADLs/functional transfers  Therapeutic activities to facilitate/challenge dynamic balance, stand tolerance, fine motor dexterity/in-hand manipulation for increased independence with ADLs      Rehab Potential: Good for established goals     Patient / Family Goal:  not stated     Patient and/or family were instructed on functional diagnosis, prognosis/goals and OT plan of care. Demonstrated fair- understanding.     · Mod Complexity  · History: Expanded review of medical records and additional review of physical, cognitive, or psychosocial history related to current functional performance  · Exam: 5+ performance deficits  · Assistance/Modification: mod/max assistance or modifications required to perform tasks. May have comorbidities that affect occupational performance.   mod Evaluation complexity completed +     Time In: 2:05  Time Out: 2:55  Total Treatment Time: 45 minutes    Min Units   OT Eval Low 86891       OT Eval Medium 61841  x    OT Eval High 00380       OT Re-Eval [id number]       Therapeutic Ex 17565       Therapeutic Activities 51353  10  1   ADL/Self Care 15540  35  2   Orthotic Management 39073       Neuro Re-Ed 12873       Total Treatment Time 45 3       Evaluation time includes thorough review of current medical information, gathering information on past medical history/social history and prior level of function, completion of standardized testing/informal observation of tasks, assessment of data, and development of POC/Goals    Anca Boone OTR/L #[id number]

## 2021-02-22 NOTE — PROGRESS NOTES
Hospitalist Progress Note      PCP: Cathy Rojas DO    Date of Admission: 2/19/2021    Hospital Course: \"82 y.o. female history of dementia, GERD, hypertension, critical aortic stenosis who was sent from Dr. Sreedhar Ortiz clinic for evaluation of presyncope symptoms. The patient had recent admission in January 2021 for presyncope. He was noted to have large pericardial effusion with tamponade physiology. She had emergent pericardiocentesis with 1.4 liters removed by CT surgery. Cytology suspicious for adenocarcinoma. She was also noted to have critical AS. Work-up for TAVR was to be started. She was seen in cardiology clinic today for follow-up. Patient states she has dyspnea on ambulation. She has intermittent lightheadedness on standing. She has dementia. Some of the history is collaborated from her son Shirin Covarrubias. In ER, BP was 108/60 on arrival.  Lab work is pertinent for BNP 7256, troponin <0.01. CTA chest/abdomen/pelvis pending. Emergent echocardiogram shows moderate circumferential pericardial effusion with no tamponade physiology. \"       Subjective:   She is anxious at bedside. No n/v.    Medications:  Reviewed    Infusion Medications    [Held by provider] sodium chloride 75 mL/hr at 02/20/21 0542     Scheduled Medications    sodium chloride flush  10 mL Intravenous 2 times per day    polyethylene glycol  17 g Oral Daily    docusate sodium  100 mg Oral BID    venlafaxine  75 mg Oral Daily    enoxaparin  40 mg Subcutaneous Daily     PRN Meds: sodium chloride flush, acetaminophen, bisacodyl, perflutren lipid microspheres, promethazine **OR** ondansetron, acetaminophen **OR** acetaminophen, potassium chloride **OR** potassium alternative oral replacement **OR** potassium chloride, magnesium sulfate, meclizine      Intake/Output Summary (Last 24 hours) at 2/22/2021 1857  Last data filed at 2/21/2021 2238  Gross per 24 hour   Intake 240 ml   Output --   Net 240 ml       Physical Exam Performed:    /72   Pulse 70   Temp 98 °F (36.7 °C) (Temporal)   Resp 18   Ht 5' 8\" (1.727 m)   Wt 140 lb (63.5 kg)   SpO2 97%   BMI 21.29 kg/m²   General appearance:  No apparent distress, appears stated age and cooperative. Elderly female. HEENT:  Normal cephalic, atraumatic without obvious deformity. Pupils equal, round, and reactive to light. Extra ocular muscles intact. Conjunctivae/corneas clear. Neck: Supple, with full range of motion. No jugular venous distention. Trachea midline. Respiratory:  Normal respiratory effort. Diminished sounds at bases. Cardiovascular:  Regular rate and rhythm with normal S1/S2 ,+ murmurs, rubs or gallops. Abdomen: Soft, non-tender, non-distended with normal bowel sounds. Musculoskeletal:  No clubbing, cyanosis or edema bilaterally. Full range of motion without deformity. Skin: Skin color, texture, turgor normal.  No rashes or lesions. Neurologic:  Neurovascularly intact without any focal sensory/motor deficits. Cranial nerves: II-XII intact, grossly non-focal.  Psychiatric:  Alert and oriented  X 2         Labs:   Recent Labs     02/20/21  0722 02/21/21  0958 02/22/21  1405   WBC 9.5 7.2 8.2   HGB 10.9* 12.7 13.4   HCT 34.7 42.8 42.7    217 247     Recent Labs     02/20/21  0722 02/21/21  0958 02/22/21  1405    141 136   K 3.8 4.1 4.4    104 101   CO2 28 31* 23   BUN 27* 21 26*   CREATININE 0.9 0.9 0.8   CALCIUM 9.2 9.4 9.2     Recent Labs     02/20/21  0722 02/21/21  0958 02/22/21  1405   AST 13 13 11   ALT 8 8 8   BILITOT 0.3 0.4 0.4   ALKPHOS 65 65 67     No results for input(s): INR in the last 72 hours.   Recent Labs     02/19/21 2127 02/20/21  0014   TROPONINI <0.01 <0.01       Urinalysis:      Lab Results   Component Value Date    NITRU Negative 01/24/2021    WBCUA 0-1 01/24/2021    WBCUA 0-1 06/24/2011    BACTERIA NONE SEEN 01/24/2021    RBCUA 0-1 01/24/2021    RBCUA NONE 06/24/2011    BLOODU SMALL 01/24/2021    SPECGRAV <=1.005 01/24/2021    GLUCOSEU Negative 01/24/2021    GLUCOSEU 100 06/24/2011       Radiology:  MRI ABDOMEN W WO CONTRAST MRCP   Final Result   1. Moderate to severe extrahepatic and mild diffuse intrahepatic biliary   ductal dilatation is again demonstrated without obstructing stone seen. This   is accompanied by mild pancreatic ductal dilatation. There is apparent   fullness of the ampulla which does raise the possibility of a small ampullary   mass. 2. Moderate left pleural effusion. 3. Small pericardial effusion. 4. Complicated cystic lesion of the lower pole of the right kidney measuring   1.5 cm, Bosniak 3 equivalent. If not intervened upon, recommend follow-up   renal mass protocol MRI in 6 months. 5. 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 Recommend follow-up pancreas protocol MRI in 2 years. XR CHEST PORTABLE   Final Result   Enlargement of the cardiac silhouette and left pleural effusion are overall   similar to the previous chest x-ray from 01/25/2021, status post interval   removal of the left chest tube. 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 No tamponade noted. Cardiology will be evaluating for TAVR. -Fall precautions  -Telemetry  -PT/OT   -As needed meclizine     Adenocarcinoma  -No definitive malignancy seen on CT chest/Abdo/pelvis  -Oncology workup noted. Pulmonology consulted for nodule.     Large pericardial effusion with tamponade s/p pericardiocentesis 1/25/21  -Repeat echocardiogram shows moderate pericardial effusion with no tamponade physiology  -As per cardiology     Critical aortic stenosis  -TAVR planned by Cardiology for Wednesday  -Left heart cath planned today    Dilated intra and extrahepatic ducts  -MRCP suggests ampulla mass  -Surgical

## 2021-02-22 NOTE — PROGRESS NOTES
Physical Therapy    Facility/Department: [facility]  Initial Assessment    NAME: Arcadio Solorzano  : 1935  MRN: [id number]    Date of Service: 2021    Physical therapy orders received/treatment attempted and chart review completed. Patient off the unit in AM and not able to be seen. Will attempt at a later time/date as able. Thank you for the opportunity to assist in the care of this patient.     Yolis Maza, PT, DPT  License [id number]

## 2021-02-22 NOTE — PROGRESS NOTES
85.9    217 247       BMP:  Recent Labs     02/20/21  0722 02/21/21  0958 02/22/21  1405    141 136   K 3.8 4.1 4.4    104 101   CO2 28 31* 23   BUN 27* 21 26*   CREATININE 0.9 0.9 0.8    ALB:3,BILIDIR:3,BILITOT:3,ALKPHOS:3)@    PT/INR:   Recent Labs     02/19/21  1621   PROTIME 12.0   INR 1.1       ABG:   No results for input(s): PH, PO2, PCO2, HCO3, BE, O2SAT, METHB, O2HB, COHB, O2CON, HHB, THB in the last 72 hours. Radiology/Other tests reviewed: none    Assessment:     Active Problems:    Critical aortic valve stenosis    Ampulla of Vater mass  Resolved Problems:    * No resolved hospital problems. *      Plan:       1. Cardiac issues and AS as per Cardiology  2. On RA and no lung medications, observe oxygenation and respiratory function  3. OOB to chair with assistance  4. CXR to assess effusion later in the week, no thoracentesis for now as minimal in amount. Time at the bedside, reviewing labs and radiographs, reviewing notes and consultations, discussing with staff and family was more than 35 minutes. Thanks for letting us see this patient in consultation. Please contact us with any questions. Office [phone] or after hours through [employer], x [phone].

## 2021-02-22 NOTE — PROCEDURES
CARDIAC CATHETERIZATION REPORT    : Dallin Rojas MD     Date of procedure:02/22/21     Indication:  1. Pre-TAVR with suspicion for LM disease on CTA    Procedures:  Coronary angiogram     Sedation/analgesia:  Midazolam IV and Fentanyl IV     Time sedation was administered: 0941. I was present in the room when sedation was administered. Procedure end time: 1942  Time spent with face to face monitoring of moderate sedation: 18 minutes    Brief history: This is a frail 82-year-old  female with symptomatic critical aortic stenosis and recurrent pericardial effusion without tamponade who is also being worked up for a possible ampullary neoplasm. Her other history significant for hypertension. She is being worked up for TAVR. Description of procedure: The patient presented to the Cath Lab in a(n) elective fashion. The patient was prepped and draped in a sterile manner. Timeout, airway and ASA assessment were completed. Local anesthesia with 1% lidocaine was administered and sedation/analgesia were administered as above. Access was obtained using the modified Seldinger technique and a micropuncture needle in the right radial artery. A 6Fshort sheath was inserted over a wire. Diagnostic angiography was performed using 5F JL3.5 and AR2.0 diagnostic catheters. Coronary anatomy:  Dominance: Right  1. Left main: 20% distal tapering. 2. LAD: Calcific 40% stenosis at the takeoff of large D1. Otherwise luminal irregularities along the LAD. 3. Left circumflex: Gives rise to a large bifurcating OM1, small OM 2, medium-sized LPL. The OM1 has 50% segmental stenosis. 4. RCA: Arises anteriorly and inferiorly in the right coronary cusp and gives rise to parallel medium sized PDA's    Hemodynamics: Ao: 128/65 with a mean of 83    Hemostasis:  Transradial band was applied for patent arterial hemostasis.     Complications: None  Estimated blood loss: 10 mL  Contrast used: 45 mL  Air kerma: 168 mGy    Conclusions:  1. 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 The case was discussed in detail with the rest of the heart team given the finding of a possible ampullary neoplasm. Given the patient's very symptomatic state and her critical aortic stenosis it is universally felt that TAVR is more safe and effective than balloon aortic valvuloplasty especially since diagnosis of malignancy has not yet been established and there does not appear to be widespread metastases on CT. Proceed with TAVR as scheduled on Wednesday February 24th  2. Consider a pericardial window post TAVR.     Zenobia Bence, MD, [facility] [place]  Interventional Cardiology/Structural Heart Disease

## 2021-02-23 ENCOUNTER — ANESTHESIA EVENT (OUTPATIENT)
Dept: OPERATING ROOM | Age: 86
DRG: 267 | End: 2021-02-23
Payer: MEDICARE

## 2021-02-23 ENCOUNTER — APPOINTMENT (OUTPATIENT)
Dept: GENERAL RADIOLOGY | Age: 86
DRG: 267 | End: 2021-02-23
Payer: MEDICARE

## 2021-02-23 LAB
AFP-TUMOR MARKER: 3 NG/ML (ref 0–9)
ALBUMIN SERPL-MCNC: 3.6 G/DL (ref 3.5–5.2)
ALP BLD-CCNC: 57 U/L (ref 35–104)
ALT SERPL-CCNC: 9 U/L (ref 0–32)
ANION GAP SERPL CALCULATED.3IONS-SCNC: 10 MMOL/L (ref 7–16)
APTT: 25.9 SEC (ref 24.5–35.1)
AST SERPL-CCNC: 17 U/L (ref 0–31)
BACTERIA: ABNORMAL /HPF
BILIRUB SERPL-MCNC: 0.2 MG/DL (ref 0–1.2)
BILIRUBIN URINE: NEGATIVE
BLOOD, URINE: ABNORMAL
BUN BLDV-MCNC: 33 MG/DL (ref 8–23)
CALCIUM SERPL-MCNC: 8.9 MG/DL (ref 8.6–10.2)
CHLORIDE BLD-SCNC: 104 MMOL/L (ref 98–107)
CLARITY: CLEAR
CO2: 25 MMOL/L (ref 22–29)
COLOR: YELLOW
CREAT SERPL-MCNC: 0.9 MG/DL (ref 0.5–1)
EKG ATRIAL RATE: 77 BPM
EKG P AXIS: 7 DEGREES
EKG P-R INTERVAL: 146 MS
EKG Q-T INTERVAL: 388 MS
EKG QRS DURATION: 74 MS
EKG QTC CALCULATION (BAZETT): 439 MS
EKG R AXIS: -29 DEGREES
EKG T AXIS: 38 DEGREES
EKG VENTRICULAR RATE: 77 BPM
GFR AFRICAN AMERICAN: >60
GFR NON-AFRICAN AMERICAN: 60 ML/MIN/1.73
GLUCOSE BLD-MCNC: 110 MG/DL (ref 74–99)
GLUCOSE URINE: NEGATIVE MG/DL
HBA1C MFR BLD: 5 % (ref 4–5.6)
HCT VFR BLD CALC: 35.6 % (ref 34–48)
HEMOGLOBIN: 10.8 G/DL (ref 11.5–15.5)
INR BLD: 1
KETONES, URINE: NEGATIVE MG/DL
LEUKOCYTE ESTERASE, URINE: ABNORMAL
MCH RBC QN AUTO: 26.5 PG (ref 26–35)
MCHC RBC AUTO-ENTMCNC: 30.3 % (ref 32–34.5)
MCV RBC AUTO: 87.5 FL (ref 80–99.9)
NITRITE, URINE: NEGATIVE
PDW BLD-RTO: 17.1 FL (ref 11.5–15)
PH UA: 6 (ref 5–9)
PLATELET # BLD: 197 E9/L (ref 130–450)
PMV BLD AUTO: 12.2 FL (ref 7–12)
POTASSIUM REFLEX MAGNESIUM: 4.3 MMOL/L (ref 3.5–5)
PROTEIN UA: NEGATIVE MG/DL
PROTHROMBIN TIME: 11.2 SEC (ref 9.3–12.4)
RBC # BLD: 4.07 E12/L (ref 3.5–5.5)
RBC UA: ABNORMAL /HPF (ref 0–2)
SODIUM BLD-SCNC: 139 MMOL/L (ref 132–146)
SPECIFIC GRAVITY UA: 1.01 (ref 1–1.03)
TOTAL PROTEIN: 6.3 G/DL (ref 6.4–8.3)
UROBILINOGEN, URINE: 0.2 E.U./DL
WBC # BLD: 10.4 E9/L (ref 4.5–11.5)
WBC UA: ABNORMAL /HPF (ref 0–5)

## 2021-02-23 PROCEDURE — 81001 URINALYSIS AUTO W/SCOPE: CPT

## 2021-02-23 PROCEDURE — 80053 COMPREHEN METABOLIC PANEL: CPT

## 2021-02-23 PROCEDURE — 93005 ELECTROCARDIOGRAM TRACING: CPT | Performed by: PHYSICIAN ASSISTANT

## 2021-02-23 PROCEDURE — 83036 HEMOGLOBIN GLYCOSYLATED A1C: CPT

## 2021-02-23 PROCEDURE — 6360000002 HC RX W HCPCS: Performed by: INTERNAL MEDICINE

## 2021-02-23 PROCEDURE — 94375 RESPIRATORY FLOW VOLUME LOOP: CPT

## 2021-02-23 PROCEDURE — 85027 COMPLETE CBC AUTOMATED: CPT

## 2021-02-23 PROCEDURE — 85610 PROTHROMBIN TIME: CPT

## 2021-02-23 PROCEDURE — 87088 URINE BACTERIA CULTURE: CPT

## 2021-02-23 PROCEDURE — 85730 THROMBOPLASTIN TIME PARTIAL: CPT

## 2021-02-23 PROCEDURE — 6370000000 HC RX 637 (ALT 250 FOR IP): Performed by: PHYSICIAN ASSISTANT

## 2021-02-23 PROCEDURE — 71045 X-RAY EXAM CHEST 1 VIEW: CPT

## 2021-02-23 PROCEDURE — 99233 SBSQ HOSP IP/OBS HIGH 50: CPT | Performed by: INTERNAL MEDICINE

## 2021-02-23 PROCEDURE — 94729 DIFFUSING CAPACITY: CPT

## 2021-02-23 PROCEDURE — 99221 1ST HOSP IP/OBS SF/LOW 40: CPT | Performed by: NURSE PRACTITIONER

## 2021-02-23 PROCEDURE — 99232 SBSQ HOSP IP/OBS MODERATE 35: CPT | Performed by: TRANSPLANT SURGERY

## 2021-02-23 PROCEDURE — 97161 PT EVAL LOW COMPLEX 20 MIN: CPT

## 2021-02-23 PROCEDURE — 6370000000 HC RX 637 (ALT 250 FOR IP): Performed by: INTERNAL MEDICINE

## 2021-02-23 PROCEDURE — 2580000003 HC RX 258: Performed by: INTERNAL MEDICINE

## 2021-02-23 PROCEDURE — 2580000003 HC RX 258: Performed by: PHYSICIAN ASSISTANT

## 2021-02-23 PROCEDURE — 36415 COLL VENOUS BLD VENIPUNCTURE: CPT

## 2021-02-23 PROCEDURE — 93010 ELECTROCARDIOGRAM REPORT: CPT | Performed by: INTERNAL MEDICINE

## 2021-02-23 PROCEDURE — 2060000000 HC ICU INTERMEDIATE R&B

## 2021-02-23 PROCEDURE — 99232 SBSQ HOSP IP/OBS MODERATE 35: CPT | Performed by: INTERNAL MEDICINE

## 2021-02-23 PROCEDURE — 97530 THERAPEUTIC ACTIVITIES: CPT

## 2021-02-23 RX ORDER — SODIUM CHLORIDE 0.9 % (FLUSH) 0.9 %
10 SYRINGE (ML) INJECTION EVERY 12 HOURS SCHEDULED
Status: DISCONTINUED | OUTPATIENT
Start: 2021-02-23 | End: 2021-02-24 | Stop reason: SDUPTHER

## 2021-02-23 RX ORDER — SODIUM CHLORIDE 0.9 % (FLUSH) 0.9 %
10 SYRINGE (ML) INJECTION PRN
Status: DISCONTINUED | OUTPATIENT
Start: 2021-02-23 | End: 2021-02-24 | Stop reason: SDUPTHER

## 2021-02-23 RX ORDER — DIPHENHYDRAMINE HCL 25 MG
50 TABLET ORAL ONCE
Status: DISCONTINUED | OUTPATIENT
Start: 2021-02-24 | End: 2021-02-24

## 2021-02-23 RX ADMIN — VENLAFAXINE 75 MG: 75 TABLET ORAL at 08:57

## 2021-02-23 RX ADMIN — Medication 10 ML: at 20:55

## 2021-02-23 RX ADMIN — ACETAMINOPHEN 650 MG: 325 TABLET ORAL at 20:19

## 2021-02-23 RX ADMIN — MECLIZINE 12.5 MG: 12.5 TABLET ORAL at 08:57

## 2021-02-23 RX ADMIN — DOCUSATE SODIUM 100 MG: 100 CAPSULE, LIQUID FILLED ORAL at 20:55

## 2021-02-23 RX ADMIN — SODIUM CHLORIDE, PRESERVATIVE FREE 10 ML: 5 INJECTION INTRAVENOUS at 08:57

## 2021-02-23 RX ADMIN — PREDNISONE 50 MG: 10 TABLET ORAL at 20:54

## 2021-02-23 RX ADMIN — DOCUSATE SODIUM 100 MG: 100 CAPSULE, LIQUID FILLED ORAL at 08:57

## 2021-02-23 ASSESSMENT — PAIN SCALES - GENERAL
PAINLEVEL_OUTOF10: 0
PAINLEVEL_OUTOF10: 5
PAINLEVEL_OUTOF10: 5

## 2021-02-23 ASSESSMENT — PAIN DESCRIPTION - PAIN TYPE: TYPE: ACUTE PAIN

## 2021-02-23 NOTE — PROGRESS NOTES
potassium chloride **OR** potassium alternative oral replacement **OR** potassium chloride, magnesium sulfate, meclizine      Intake/Output Summary (Last 24 hours) at 2/23/2021 1231  Last data filed at 2/23/2021 0345  Gross per 24 hour   Intake 60 ml   Output --   Net 60 ml       Physical Exam Performed:    /70   Pulse 74   Temp 98.4 °F (36.9 °C) (Temporal)   Resp 18   Ht 5' 8\" (1.727 m)   Wt 140 lb (63.5 kg)   SpO2 97%   BMI 21.29 kg/m²   General appearance:  No apparent distress, appears stated age and cooperative. Elderly female. HEENT:  Normal cephalic, atraumatic without obvious deformity. Pupils equal, round, and reactive to light. Extra ocular muscles intact. Conjunctivae/corneas clear. Neck: Supple, with full range of motion. No jugular venous distention. Trachea midline. Respiratory:  Normal respiratory effort. Diminished sounds at bases. Cardiovascular:  Regular rate and rhythm with normal S1/S2 ,+ murmurs, rubs or gallops. Abdomen: Soft, non-tender, non-distended with normal bowel sounds. Musculoskeletal:  No clubbing, cyanosis or edema bilaterally. Full range of motion without deformity. Skin: Skin color, texture, turgor normal.  No rashes or lesions. Neurologic:  Neurovascularly intact without any focal sensory/motor deficits. Cranial nerves: II-XII intact, grossly non-focal.  Psychiatric:  Alert and oriented  X 2         Labs:   Recent Labs     02/21/21  0958 02/22/21  1405 02/23/21  0418   WBC 7.2 8.2 10.4   HGB 12.7 13.4 10.8*   HCT 42.8 42.7 35.6    247 197     Recent Labs     02/21/21  0958 02/22/21  1405 02/23/21  0418    136 139   K 4.1 4.4 4.3    101 104   CO2 31* 23 25   BUN 21 26* 33*   CREATININE 0.9 0.8 0.9   CALCIUM 9.4 9.2 8.9     Recent Labs     02/21/21  0958 02/22/21  1405 02/23/21  0418   AST 13 11 17   ALT 8 8 9   BILITOT 0.4 0.4 0.2   ALKPHOS 65 67 57     No results for input(s): INR in the last 72 hours.   No results for input(s): CKTOTAL, nodule.     Large pericardial effusion with tamponade s/p pericardiocentesis 1/25/21  -Repeat echocardiogram shows moderate pericardial effusion with no tamponade physiology  -As per cardiology     Critical aortic stenosis  -TAVR planned by Cardiology for Wednesday. Per cardiology TAVR discussion, patient will benefit more from TAVR than valvuloplasty. Recommendations will pericardial window post TAVR. -Left heart cath planned 2/22/2021 showed mild to moderate nonobstructive CAD. Dilated intra and extrahepatic ducts  -MRCP suggests ampulla mass  -Surgical Oncology recommends Chromogranin A, Endscopic Surgery consult for EUS. EUS held pending cardiac work-up. Bosniak 3 right kidney cyst  -Urology consultation noted.   Surveillance planned for 6 months outpatient.     Dementia  -Not on any medication.  -Delirium precautions     Constipation  -Continue stool softeners and laxatives    Depression  -Palliative care and psychiatry consultation ordered per oncology.       DVT Prophylaxis: Lovenox  Diet: DIET GENERAL;  Diet NPO Time Specified Exceptions are: Sips of Water with Meds  Code Status: Full Code    PT/OT Eval Status: Ordered ordered    Dispo -pending work-up    Jessie Pete MD

## 2021-02-23 NOTE — PROGRESS NOTES
INPATIENT MEDICAL ONCOLOGY PROGRESS NOTE    SUBJECTIVE:    Cardiac cath yesterday-mild to moderate nonobstructive CAD   Afebrile, c/o chronic back pain   Denies SO/CP, reports depression with everything going on     OBJECTIVE  Physical Exam:  VITALS:  /70   Pulse 74   Temp 98.4 °F (36.9 °C) (Temporal)   Resp 18   Ht 5' 8\" (1.727 m)   Wt 140 lb (63.5 kg)   SpO2 97%   BMI 21.29 kg/m²   ENT:  oropharynx clear, no evidence of mucositis, reports copious secretions    NECK:  No  lymphadenopathy. HEMATOLOGIC/LYMPHATICS:  No abnormal lymphadenopathy. LUNGS:  Clear to auscultation, room air    CARDIOVASCULAR:  Regular rate and rhythm. No clicks, murmurs, rubs or gallops. ABDOMEN:  Soft, nontender. No ascites. No mass or organomegaly. NEUROLOGIC:  No focal deficits. SKIN:  No rash. EXTREMITIES: without clubbing, cyanosis, or edema. DIAGNOSTIC DATA  Labs:    Lab Results   Component Value Date    WBC 10.4 02/23/2021    HGB 10.8 (L) 02/23/2021    HCT 35.6 02/23/2021    MCV 87.5 02/23/2021     02/23/2021     Lab Results   Component Value Date    CEA 7.0 (H) 02/21/2021     IMPRESSION:  Patient Active Problem List   Diagnosis    Anxiety    Migraine headache    Accidental drug overdose    Essential hypertension, benign    GERD (gastroesophageal reflux disease)    Dementia (HCC)    Polypharmacy    Malnutrition (HCC)    Acute delirium    Pericardial effusion    Pain syndrome, chronic    Critical aortic valve stenosis    Ampulla of Vater mass     82 y. o. female history of dementia, GERD, hypertension, critical aortic stenosis who had recent admission in January 2021 for presyncope. Noted to have large pericardial effusion with tamponade physiology.    Emergent pericardiocentesis with 1.4 liters removed by CT surgery. Cytology suspicious for adenocarcinoma.       She was also noted to have critical AS.  Work-up for TAVR was started.      She was seen in cardiology clinic

## 2021-02-23 NOTE — CONSULTS
Palliative Care Department  Palliative Care Initial Consult  Provider: [place] MCDONNELL  [phone]    Hospital Day: 5  Date of Initial Consult: 2/23/2021  Referring Provider: Maisha MCDONNELL  Palliative Medicine was consulted for assistance with: Assist with goals of care, Symptom Management and Family Support    Chief Complaint: Radha Manzano is a 82 y.o. female with chief complaint of dizziness    HPI:   Radha Manzano is a 82 y.o. female with significant past medical history of severe aortic stenosis and a recent large pericardial effusion s/p pericardiocentesis with cytology suspicious for adenocarcinoma. She underwent further workup and is noted to have extrahepatic and cetral intrahepatic biliary ductal dilation and there is concern for a possible ampullary neoplasm. She was admitted on 2/19/2021 after follow up with cardiology and sent in for further management of her severe aortic stenosis, as she has been having presyncope. She is planned to have TAVR and possibly a pericardial window performed. Oncology and surgery are following closely and plan to further work up possible neoplasm after AS is addressed. 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  At this time, Radha Manzano, Does have capacity for medical decision-making. Capacity is time limited and situation/question specific.    Outcome of goals of care meeting: continue current management and to be determined   Code status: Full Code   Advanced Directives: None   Surrogate/Legal NOK:   Alfredo Haney () is the patient's son  [facility] Will follow and continued to discuss goals of care pending clinical progression. Depression/Anxiety:    -  Related to her current condition and likely exacerbated by somewhat poor understanding of her condition and needs. -  Will defer management to psychiatry at this time   -  Outpatient palliative support will also be available as needs and treatment plan are identified    Referrals: none today    SUBJECTIVE:   Events/Discussions:  2/23/2021:  Mrs. Fili Rosario was seen at there bedside today, no family present. She is alert and oriented, however she does not appear to be a very good historian. She does not demonstrate good knowledge of her current condition but does express some stress and concern regarding her needs. She admits to feeling overwhelmed by what is going on with her but is not able to express a good understanding of her needs or current plan of care. She is for pulmonary function test at this time and is agreeable having palliative care follow along to provide support for her through her hospitalization. Past Medical History:   Diagnosis Date    Accidental drug overdose 5/21/2014    Acute delirium 12/26/2014    Anxiety     Arthritis     Dizziness, nonspecific     Generalized headaches     Heart murmur     stable per pt    Hyperlipidemia     Hypertension     Knee pain     right    Malnutrition ([address] 12/26/2014    Migraine headache     Pericardial effusion 01/25/2021    Polypharmacy 12/26/2014    Reflux gastritis     Rhabdomyolysis 5/21/2014    Sinus problem        Past Surgical History:   Procedure Laterality Date    APPENDECTOMY      BACK SURGERY  2007    lumbar    CHOLECYSTECTOMY      HYSTERECTOMY      PERICARDIUM SURGERY N/A 1/25/2021    PERICARDIOCENTESIS performed by Maria Gillespie MD at [address] [place] [address]  9/17/2012    right       History reviewed. No pertinent family history.     Allergies   Allergen Reactions    Other Rash     \"A type of IVP dye from the 50's\"       ROS: UNLESS STATED ABOVE PATIENT DENIES:  CONSTITUTIONAL:  fever, chill, rigors, nausea, vomiting, fatigue. HEENT: blurry vision, double vision, hearing problem, tinnitus, hoarseness, dysphagia, odynophagia  RESPIRATORY: cough, shortness of breath, sputum expectoration. CARDIOVASCULAR:  Chest pain/pressure, palpitation, syncope, irregular beats  GASTROINTESTINAL:  abdominal or rectal pain, diarrhea, constipation, . GENITOURINARY:  Burning, frequency, urgency, incontinence, discharge  INTEGUMENTARY: rash, wound, pruritis  HEMATOLOGIC/LYMPHATIC:  Swelling, sores, gum bleeding, easy bruising, pica. MUSCULOSKELETAL:  pain, edema, joint swelling or redness  NEUROLOGICAL:  light headed, dizziness, loss of consciousness, weakness, change in memory, seizures, tremors    OBJECTIVE:   Prognosis: unknown    Physical Exam:  /70   Pulse 74   Temp 98.4 °F (36.9 °C) (Temporal)   Resp 18   Ht 5' 8\" (1.727 m)   Wt 140 lb (63.5 kg)   SpO2 97%   BMI 21.29 kg/m²     Gen:  Alert, appears stated age, well nourished, in no acute distress  HEENT:  Normocephalic, conjunctiva pink, no drainage, mucosa moist  Neck:  Supple  Lungs:  CTA bilaterally, no audible rhonchi or wheezes noted  Heart: RRR, murmur, no rub, or gallop noted during exam  Abd:  Soft, non tender, non distended, BS+  M/S/Ext:  Moving all extremities, no edema, pulses present  Skin:  Warm and dry  Neuro:  PERRL, Alert, oriented x 3; following commands    Objective data reviewed: labs, images, records, medication use, vitals and chart    Time/Communication:  Greater than 50% of time spent, total 30 minutes in counseling and coordination of care at the bedside regarding goals of care, symptom management and see above. Zuleima MALONEY-HAYLEE  Palliative Medicine    Patient and the plan of care discussed with the other IDT members of Dr. Alicia Diego, and with patient, as appropriate and available.     Thank you for allowing Palliative Medicine to participate in the care of [employer]. Note: This report was completed using [employer] voiced recognition software. Every effort has been made to ensure accuracy; however, inadvertent computerized transcription errors may be present.

## 2021-02-23 NOTE — PROGRESS NOTES
[facility] SURGERY  DAILY PROGRESS NOTE  2/23/2021    CC: weightloss    Subjective:  No acute events overnight   Cardiology following-  critical aortic stenosis  Heart cath yesterday- see cardiology note for details   No acute distress  MRI- fullness of the ampulla possible mass with dilation in hepatic and pancreatic ducts    Still NPO  CEA 7.0    Objective:  /70   Pulse 74   Temp 98.4 °F (36.9 °C) (Temporal)   Resp 18   Ht 5' 8\" (1.727 m)   Wt 140 lb (63.5 kg)   SpO2 97%   BMI 21.29 kg/m²     GENERAL:  Laying in bed, awake, alert, cooperative, in no apparent distress  HEAD: Normocephalic  EYES: No sclera icterus  LUNGS:  No increased work of breathing  CARDIOVASCULAR:  RR  ABDOMEN:  Soft, non- tender, non-distended  EXTREMITIES: No edema  SKIN: Warm    Assessment/Plan:  82 y.o. female  with intrahepatic and extrahepatic duct dilation, pancreatic duct dilation. Cytology from pericardial effusion concerning for possible adenocarcinoma.     Overall care per primary   Discussion with Dr. Pedro Yeh yesterday evening will hold off EUS until cardiac work-up is complete  MRI- fullness of the ampulla possible mass with dilation in hepatic and pancreatic ducts    Still NPO  Pain control  CEA is 7.0  Awaiting tumors markers Chromo A,CA 19-9, AFP  Anti- nausea control  Dr. Chad Carlos will attempt multidisciplinary discussion with cardiology and cardiothoracic surgery regarding TAVR          Electronically signed by Jose Araya DO on 2/23/2021 at 6:40 AM     Agree with above  Tumor markers pending  Needs EUS  Pericardial cytology suspicious for malignancy    Electronically signed by Delfin De La Cruz MD on 2/23/2021 at 9:23 AM

## 2021-02-23 NOTE — PROGRESS NOTES
[facility]  [address] [facility], [address], [address] [facility]      Pulmonary Progress Note      SUBJECTIVE:  Pt being seen for Dr Ramírez Ware   Pt sitting up in bed on RA, claims doing ok with breathing, not much problem with cough/congestion, ambulating some with walker and tolerating. For TAVR on Wednesday.     OBJECTIVE    Medications    Continuous Infusions:   [Held by provider] sodium chloride 75 mL/hr at 21 0542       Scheduled Meds:   sodium chloride flush  10 mL Intravenous 2 times per day    predniSONE  50 mg Oral Once    Followed by   Alexandro Proud ON 2021] predniSONE  50 mg Oral Once    Followed by   Alexandro Proud ON 2021] predniSONE  50 mg Oral Once    Followed by   Alexandro Proud ON 2021] diphenhydrAMINE  50 mg Oral Once    polyethylene glycol  17 g Oral Daily    docusate sodium  100 mg Oral BID    venlafaxine  75 mg Oral Daily    enoxaparin  40 mg Subcutaneous Daily       PRN Meds:sodium chloride flush, acetaminophen, bisacodyl, perflutren lipid microspheres, promethazine **OR** ondansetron, acetaminophen **OR** acetaminophen, potassium chloride **OR** potassium alternative oral replacement **OR** potassium chloride, magnesium sulfate, meclizine    Physical    VITALS:  /79   Pulse 76   Temp 98 °F (36.7 °C) (Temporal)   Resp 18   Ht 5' 8\" (1.727 m)   Wt 140 lb (63.5 kg)   SpO2 99%   BMI 21.29 kg/m²     24HR INTAKE/OUTPUT:      Intake/Output Summary (Last 24 hours) at 2021 1644  Last data filed at 2021 0345  Gross per 24 hour   Intake 60 ml   Output --   Net 60 ml       24HR PULSE OXIMETRY RANGE:    SpO2  Av %  Min: 97 %  Max: 99 %    General appearance: alert, appears stated age and cooperative  Lungs: rhonchi bibasilar  Heart: systolic murmur  Abdomen: soft, non-tender; bowel sounds normal; no masses,  no organomegaly  Extremities: extremities normal, atraumatic, no cyanosis or edema  Neurologic: Mental status: Alert, oriented, thought content appropriate    Data    CBC:   Recent Labs     02/21/21  0958 02/22/21  1405 02/23/21  0418   WBC 7.2 8.2 10.4   HGB 12.7 13.4 10.8*   HCT 42.8 42.7 35.6   MCV 89.2 85.9 87.5    247 197       BMP:  Recent Labs     02/21/21  0958 02/22/21  1405 02/23/21  0418    136 139   K 4.1 4.4 4.3    101 104   CO2 31* 23 25   BUN 21 26* 33*   CREATININE 0.9 0.8 0.9    ALB:3,BILIDIR:3,BILITOT:3,ALKPHOS:3)@    PT/INR:   Recent Labs     02/23/21  1443   PROTIME 11.2   INR 1.0       ABG:   No results for input(s): PH, PO2, PCO2, HCO3, BE, O2SAT, METHB, O2HB, COHB, O2CON, HHB, THB in the last 72 hours. Radiology/Other tests reviewed: PFT with normal spirometer reading, mod reduction in DLCO probably due to effusion/edema    Assessment:     Active Problems:    Critical aortic valve stenosis    Ampulla of Vater mass  Resolved Problems:    * No resolved hospital problems. *      Plan:       1. Cardiac issues and AS as per Cardiology, for TAVR tomorrow  2. On RA and no lung medications, observe oxygenation and respiratory function  3. OOB to chair with assistance  4. CXR to assess effusion later in the week, no thoracentesis for now as minimal in amount. Time at the bedside, reviewing labs and radiographs, reviewing notes and consultations, discussing with staff and family was more than 35 minutes. Thanks for letting us see this patient in consultation. Please contact us with any questions. Office [phone] or after hours through [employer], x [phone].

## 2021-02-23 NOTE — PROGRESS NOTES
ENDOSCOPIC SURGERY  DAILY PROGRESS NOTE  2/23/2021    Subjective:  No acute events overnight   Cardiology following-  critical aortic stenosis  Heart cath yesterday- see cardiology note for details   No acute distress  MRI- fullness of the ampulla possible mass with dilation in hepatic and pancreatic ducts    Still NPO  CEA 7.0     Objective:  /70   Pulse 74   Temp 98.4 °F (36.9 °C) (Temporal)   Resp 18   Ht 5' 8\" (1.727 m)   Wt 140 lb (63.5 kg)   SpO2 97%   BMI 21.29 kg/m²     GENERAL:  Laying in bed, awake, alert, cooperative, no apparent distress  HEAD: Normocephalic  EYES: No sclera icterus, pupils equal  LUNGS:  No increased work of breathing, on room air   CARDIOVASCULAR:  RR  ABDOMEN:  Soft, non-tender, non-distended  EXTREMITIES: No edema or swelling  SKIN: Warm and dry    Assessment/Plan:  82 y.o. female  with intrahepatic and extrahepatic duct dilation, pancreatic duct dilation.  Cytology from pericardial effusion concerning for possible adenocarcinoma. Overall care per primary   Discussion with Dr. Akiko Knox yesterday evening will hold off EUS until cardiac work-up is complete  EUS timing pending   MRI- fullness of the ampulla possible mass with dilation in hepatic and pancreatic ducts    Still NPO  Pain control  CEA is 7.0  Awaiting tumors markers Chromo A, CA 19-9, AFP  Anti- nausea control        Electronically signed by Zak Garcia DO on 2/23/2021 at 6:46 AM     As above.    EUS when ok with cardiology

## 2021-02-23 NOTE — PROGRESS NOTES
Physical Therapy    Physical Therapy Initial Assessment     Name: Nguyen Gilbert  : 1935  MRN: [id number]    Referring Provider:  Teto Willingham MD    Date of Service: 2021    Evaluating PT:  Beba Anders PT, DPT    Room #:  9269/4173-B  Diagnosis:  Critical aortic valve stenosis  PMHx/PSHx:  Heart murmur, HTN, Arthritis, Anxiety, Knee pain, Dizziness, Migraine headache, Rhabdomyolysis, Polypharmacy, Malnutrition, Acute delirium, Hyperlipidemia, Pericardial effusion, Back surgery , R TKA , Pericardium surgery 2021  Procedure/Surgery:  Cardiac cath 2021  Precautions:  Falls  Equipment Needs:  Has [employer]    SUBJECTIVE:    Pt lives alone in a 2 story home with 2 stairs to enter and 1 rail(s). Bed is on 2nd floor and bath is on 2nd floor with flight and 1 rail(s). Pt ambulated with no AD PTA. Pt reported independent with ADLs. Pt is not actively driving. Pt reported son to set up first floor bed/bath soon. OBJECTIVE:   Initial Evaluation  Date: 2021 Treatment  NA Short Term/ Long Term   Goals   AM-PAC 6 Clicks 97/49     Was pt agreeable to Eval/treatment? Yes      Does pt have pain? Reported slight HA pain. Did not quantify. Bed Mobility  Rolling: Independent  Supine to sit: Independent  Sit to supine: Independent  Scooting: Independent  Independent   Transfers Sit to stand: SBA  Stand to sit: SBA  Stand pivot: SBA  Mod I   Ambulation    40 feet with WW with CGA  >150 feet with WW with Mod I   Stair negotiation: ascended and descended  NT  12 steps with 1 rail with Supervision   ROM BUE:  WFL  BLE:  WFL     Strength BUE:  4-/5  BLE:  4-/5  Increase 1/3 grade MMT   Balance Sitting EOB:  Independent  Dynamic Standing:  CGA with [employer]  Sitting EOB:  Independent  Dynamic Standing:   Mod I with [employer]     Pt is A & O x 4  Sensation:  Pt denied numbness and tingling  Edema:  None noted    Vitals:  Blood Pressure at rest  Blood Pressure post session    Heart Rate at rest  Heart Rate post session    SPO2 at rest  SPO2 post session      Therapeutic Exercises:  NT    Patient education  Pt educated on purpose of PT assessment, importance of mobility, safety with mobility, transfers, gait    Patient response to education:   Pt verbalized understanding Pt demonstrated skill Pt requires further education in this area   Yes  Yes with verbal cues and assist Yes      ASSESSMENT:    Comments:  Patient cleared by RN and agreeable to treatment. Patient found seated EOB and reported having an \"eventful afternoon with her (A bed patient)\". Patient reported slight headache pain, but willing to participate with in room only mobility. Patient concerned with not having clothing and given additional hospital gown to wear as a robe. Patient assisted to standing and utilized [employer] for balance. Patient with slower gait speed, equal stride length, but poor walker approximation. Patient given education and cues for proper walker use with fair carryover. Patient not willing to ambulate in the [place] and returned to bedside. Chair set up for patient and assisted to sitting in the bedside chair. Call light and tray table in reach. Treatment:  Patient practiced and was instructed in the following treatment:     Bed mobility: Verbal/tactile cues for sequencing BUEs/BLEs for safe technique with rolling/supine<>sit task.  Transfer training: Verbal/tactile cues to facilitate proper hand placement, technique and safety during sit to stand task.  Gait training: Verbal and tactile cues to facilitate upright posture and safety as well as provided with physical assistance to complete task. Verbal cues for walker safety/approximation.  Therapeutic exercises: As noted above   Neuromuscular education: NT    Pt's/ family goals   1. To feel better. Patient and or family understand(s) diagnosis, prognosis, and plan of care.   Yes     PLAN OF CARE:    Current Treatment Recommendations     [x] Strengthening     [] ROM   [x] Balance Training   [x] Endurance Training   [x] Transfer Training   [x] Gait Training   [x] Stair Training   [] Positioning   [x] Safety and Education Training   [x] Patient/Caregiver Education   [] HEP  [] Other       PT care will be provided in accordance with the objectives noted above and progressed and updated when appropriate. Exercises and functional mobility practice will be used as well as appropriate assistive devices or modalities to obtain goals. Patient and family education will also be administered as needed. Frequency of treatments: 2-5x/week x 1-2 weeks. Time in  1421  Time out  1439    Total Treatment Time  13 minutes     Evaluation Time includes thorough review of current medical information, gathering information on past medical history/social history and prior level of function, completion of standardized testing/informal observation of tasks, assessment of data and education on plan of care and goals.     CPT codes:  [x] Low Complexity PT evaluation 00933  [] Moderate Complexity PT evaluation 79452  [] High Complexity PT evaluation 79766  [] PT Re-evaluation 47200  [] Gait training 30634 - minutes  [] Manual therapy 87921 - minutes  [x] Therapeutic activities 65046 13 minutes  [] Therapeutic exercises 61602 - minutes  [] Neuromuscular reeducation 21064 - minutes     Dev Cardoza, PT, DPT  License [id number]

## 2021-02-24 ENCOUNTER — TELEPHONE (OUTPATIENT)
Dept: CARDIOLOGY CLINIC | Age: 86
End: 2021-02-24

## 2021-02-24 ENCOUNTER — ANESTHESIA (OUTPATIENT)
Dept: OPERATING ROOM | Age: 86
DRG: 267 | End: 2021-02-24
Payer: MEDICARE

## 2021-02-24 VITALS — OXYGEN SATURATION: 99 %

## 2021-02-24 LAB
CA 19-9: 6 U/ML (ref 0–37)
CHROMOGRANIN A: 56 NG/ML (ref 0–103)
HCT VFR BLD CALC: 40.4 % (ref 34–48)
HEMOGLOBIN: 12.6 G/DL (ref 11.5–15.5)
MCH RBC QN AUTO: 27.2 PG (ref 26–35)
MCHC RBC AUTO-ENTMCNC: 31.2 % (ref 32–34.5)
MCV RBC AUTO: 87.1 FL (ref 80–99.9)
METER GLUCOSE: 119 MG/DL (ref 74–99)
PDW BLD-RTO: 17.1 FL (ref 11.5–15)
PLATELET # BLD: 220 E9/L (ref 130–450)
PMV BLD AUTO: 12.1 FL (ref 7–12)
RBC # BLD: 4.64 E12/L (ref 3.5–5.5)
WBC # BLD: 7.2 E9/L (ref 4.5–11.5)

## 2021-02-24 PROCEDURE — 3600000009 HC SURGERY ROBOT BASE: Performed by: INTERNAL MEDICINE

## 2021-02-24 PROCEDURE — 02HV33Z INSERTION OF INFUSION DEVICE INTO SUPERIOR VENA CAVA, PERCUTANEOUS APPROACH: ICD-10-PCS | Performed by: ANESTHESIOLOGY

## 2021-02-24 PROCEDURE — 88112 CYTOPATH CELL ENHANCE TECH: CPT

## 2021-02-24 PROCEDURE — 85347 COAGULATION TIME ACTIVATED: CPT

## 2021-02-24 PROCEDURE — 6360000002 HC RX W HCPCS: Performed by: ANESTHESIOLOGY

## 2021-02-24 PROCEDURE — 3700000000 HC ANESTHESIA ATTENDED CARE: Performed by: INTERNAL MEDICINE

## 2021-02-24 PROCEDURE — 2580000003 HC RX 258: Performed by: PHYSICIAN ASSISTANT

## 2021-02-24 PROCEDURE — C1760 CLOSURE DEV, VASC: HCPCS

## 2021-02-24 PROCEDURE — C1894 INTRO/SHEATH, NON-LASER: HCPCS | Performed by: INTERNAL MEDICINE

## 2021-02-24 PROCEDURE — 6370000000 HC RX 637 (ALT 250 FOR IP): Performed by: PHYSICIAN ASSISTANT

## 2021-02-24 PROCEDURE — S2900 ROBOTIC SURGICAL SYSTEM: HCPCS | Performed by: INTERNAL MEDICINE

## 2021-02-24 PROCEDURE — 2500000003 HC RX 250 WO HCPCS: Performed by: INTERNAL MEDICINE

## 2021-02-24 PROCEDURE — 3600000019 HC SURGERY ROBOT ADDTL 15MIN: Performed by: INTERNAL MEDICINE

## 2021-02-24 PROCEDURE — C1769 GUIDE WIRE: HCPCS | Performed by: INTERNAL MEDICINE

## 2021-02-24 PROCEDURE — 2500000003 HC RX 250 WO HCPCS

## 2021-02-24 PROCEDURE — 7100000000 HC PACU RECOVERY - FIRST 15 MIN: Performed by: INTERNAL MEDICINE

## 2021-02-24 PROCEDURE — 33361 REPLACE AORTIC VALVE PERQ: CPT | Performed by: INTERNAL MEDICINE

## 2021-02-24 PROCEDURE — 88305 TISSUE EXAM BY PATHOLOGIST: CPT

## 2021-02-24 PROCEDURE — 02RF38Z REPLACEMENT OF AORTIC VALVE WITH ZOOPLASTIC TISSUE, PERCUTANEOUS APPROACH: ICD-10-PCS | Performed by: INTERNAL MEDICINE

## 2021-02-24 PROCEDURE — 7100000001 HC PACU RECOVERY - ADDTL 15 MIN: Performed by: INTERNAL MEDICINE

## 2021-02-24 PROCEDURE — 36620 INSERTION CATHETER ARTERY: CPT

## 2021-02-24 PROCEDURE — 85027 COMPLETE CBC AUTOMATED: CPT

## 2021-02-24 PROCEDURE — 0W9D3ZZ DRAINAGE OF PERICARDIAL CAVITY, PERCUTANEOUS APPROACH: ICD-10-PCS | Performed by: THORACIC SURGERY (CARDIOTHORACIC VASCULAR SURGERY)

## 2021-02-24 PROCEDURE — 82962 GLUCOSE BLOOD TEST: CPT

## 2021-02-24 PROCEDURE — 33016 PERICARDIOCENTESIS W/IMAGING: CPT | Performed by: INTERNAL MEDICINE

## 2021-02-24 PROCEDURE — 6360000002 HC RX W HCPCS

## 2021-02-24 PROCEDURE — B3101ZZ FLUOROSCOPY OF THORACIC AORTA USING LOW OSMOLAR CONTRAST: ICD-10-PCS | Performed by: INTERNAL MEDICINE

## 2021-02-24 PROCEDURE — C1894 INTRO/SHEATH, NON-LASER: HCPCS

## 2021-02-24 PROCEDURE — C1769 GUIDE WIRE: HCPCS

## 2021-02-24 PROCEDURE — 93308 TTE F-UP OR LMTD: CPT

## 2021-02-24 PROCEDURE — 6360000002 HC RX W HCPCS: Performed by: PHYSICIAN ASSISTANT

## 2021-02-24 PROCEDURE — 3700000001 HC ADD 15 MINUTES (ANESTHESIA): Performed by: INTERNAL MEDICINE

## 2021-02-24 PROCEDURE — P9041 ALBUMIN (HUMAN),5%, 50ML: HCPCS

## 2021-02-24 PROCEDURE — 36556 INSERT NON-TUNNEL CV CATH: CPT

## 2021-02-24 PROCEDURE — 36415 COLL VENOUS BLD VENIPUNCTURE: CPT

## 2021-02-24 PROCEDURE — 2780000006 HC MISC HEART VALVE: Performed by: INTERNAL MEDICINE

## 2021-02-24 PROCEDURE — 2580000003 HC RX 258

## 2021-02-24 PROCEDURE — 2140000000 HC CCU INTERMEDIATE R&B

## 2021-02-24 PROCEDURE — 33361 REPLACE AORTIC VALVE PERQ: CPT | Performed by: THORACIC SURGERY (CARDIOTHORACIC VASCULAR SURGERY)

## 2021-02-24 PROCEDURE — 2709999900 HC NON-CHARGEABLE SUPPLY: Performed by: INTERNAL MEDICINE

## 2021-02-24 PROCEDURE — 2709999900 HC NON-CHARGEABLE SUPPLY

## 2021-02-24 PROCEDURE — C1725 CATH, TRANSLUMIN NON-LASER: HCPCS | Performed by: INTERNAL MEDICINE

## 2021-02-24 DEVICE — VALVE AORT 23 MM TRANSCATHETER HRT VLV SYS SAPIEN 3 ULTRA: Type: IMPLANTABLE DEVICE | Site: HEART | Status: FUNCTIONAL

## 2021-02-24 RX ORDER — SODIUM CHLORIDE 0.9 % (FLUSH) 0.9 %
10 SYRINGE (ML) INJECTION PRN
Status: DISCONTINUED | OUTPATIENT
Start: 2021-02-24 | End: 2021-03-02 | Stop reason: HOSPADM

## 2021-02-24 RX ORDER — KETAMINE HCL IN NACL, ISO-OSM 100MG/10ML
SYRINGE (ML) INJECTION PRN
Status: DISCONTINUED | OUTPATIENT
Start: 2021-02-24 | End: 2021-02-24 | Stop reason: SDUPTHER

## 2021-02-24 RX ORDER — HEPARIN SODIUM 1000 [USP'U]/ML
INJECTION, SOLUTION INTRAVENOUS; SUBCUTANEOUS PRN
Status: DISCONTINUED | OUTPATIENT
Start: 2021-02-24 | End: 2021-02-24 | Stop reason: SDUPTHER

## 2021-02-24 RX ORDER — SODIUM CHLORIDE 9 MG/ML
INJECTION, SOLUTION INTRAVENOUS CONTINUOUS PRN
Status: DISCONTINUED | OUTPATIENT
Start: 2021-02-24 | End: 2021-02-24 | Stop reason: SDUPTHER

## 2021-02-24 RX ORDER — CEFAZOLIN SODIUM 1 G/3ML
INJECTION, POWDER, FOR SOLUTION INTRAMUSCULAR; INTRAVENOUS PRN
Status: DISCONTINUED | OUTPATIENT
Start: 2021-02-24 | End: 2021-02-24 | Stop reason: SDUPTHER

## 2021-02-24 RX ORDER — MIDAZOLAM HYDROCHLORIDE 1 MG/ML
INJECTION INTRAMUSCULAR; INTRAVENOUS PRN
Status: DISCONTINUED | OUTPATIENT
Start: 2021-02-24 | End: 2021-02-24 | Stop reason: SDUPTHER

## 2021-02-24 RX ORDER — INSULIN GLARGINE 100 [IU]/ML
0.15 INJECTION, SOLUTION SUBCUTANEOUS NIGHTLY
Status: DISCONTINUED | OUTPATIENT
Start: 2021-02-25 | End: 2021-03-02 | Stop reason: HOSPADM

## 2021-02-24 RX ORDER — CHLORHEXIDINE GLUCONATE 4 G/100ML
SOLUTION TOPICAL SEE ADMIN INSTRUCTIONS
Status: DISCONTINUED | OUTPATIENT
Start: 2021-02-24 | End: 2021-02-24

## 2021-02-24 RX ORDER — NITROGLYCERIN 5 MG/ML
INJECTION, SOLUTION INTRAVENOUS PRN
Status: DISCONTINUED | OUTPATIENT
Start: 2021-02-24 | End: 2021-02-24 | Stop reason: SDUPTHER

## 2021-02-24 RX ORDER — ASPIRIN 81 MG/1
81 TABLET ORAL DAILY
Status: DISCONTINUED | OUTPATIENT
Start: 2021-02-24 | End: 2021-03-02 | Stop reason: HOSPADM

## 2021-02-24 RX ORDER — ALBUMIN, HUMAN INJ 5% 5 %
SOLUTION INTRAVENOUS PRN
Status: DISCONTINUED | OUTPATIENT
Start: 2021-02-24 | End: 2021-02-24 | Stop reason: SDUPTHER

## 2021-02-24 RX ORDER — DEXTROSE MONOHYDRATE 50 MG/ML
100 INJECTION, SOLUTION INTRAVENOUS PRN
Status: DISCONTINUED | OUTPATIENT
Start: 2021-02-24 | End: 2021-03-02 | Stop reason: HOSPADM

## 2021-02-24 RX ORDER — CHLORHEXIDINE GLUCONATE 0.12 MG/ML
15 RINSE ORAL ONCE
Status: DISCONTINUED | OUTPATIENT
Start: 2021-02-25 | End: 2021-02-24

## 2021-02-24 RX ORDER — OXYCODONE HYDROCHLORIDE AND ACETAMINOPHEN 5; 325 MG/1; MG/1
1 TABLET ORAL EVERY 4 HOURS PRN
Status: DISCONTINUED | OUTPATIENT
Start: 2021-02-24 | End: 2021-03-02 | Stop reason: HOSPADM

## 2021-02-24 RX ORDER — SODIUM CHLORIDE, SODIUM LACTATE, POTASSIUM CHLORIDE, CALCIUM CHLORIDE 600; 310; 30; 20 MG/100ML; MG/100ML; MG/100ML; MG/100ML
INJECTION, SOLUTION INTRAVENOUS CONTINUOUS PRN
Status: DISCONTINUED | OUTPATIENT
Start: 2021-02-24 | End: 2021-02-24 | Stop reason: SDUPTHER

## 2021-02-24 RX ORDER — MIDAZOLAM HYDROCHLORIDE 1 MG/ML
2 INJECTION INTRAMUSCULAR; INTRAVENOUS ONCE
Status: COMPLETED | OUTPATIENT
Start: 2021-02-24 | End: 2021-02-24

## 2021-02-24 RX ORDER — FENTANYL CITRATE 50 UG/ML
INJECTION, SOLUTION INTRAMUSCULAR; INTRAVENOUS PRN
Status: DISCONTINUED | OUTPATIENT
Start: 2021-02-24 | End: 2021-02-24 | Stop reason: SDUPTHER

## 2021-02-24 RX ORDER — NICOTINE POLACRILEX 4 MG
15 LOZENGE BUCCAL PRN
Status: DISCONTINUED | OUTPATIENT
Start: 2021-02-24 | End: 2021-03-02 | Stop reason: HOSPADM

## 2021-02-24 RX ORDER — PROTAMINE SULFATE 10 MG/ML
INJECTION, SOLUTION INTRAVENOUS PRN
Status: DISCONTINUED | OUTPATIENT
Start: 2021-02-24 | End: 2021-02-24 | Stop reason: SDUPTHER

## 2021-02-24 RX ORDER — FENTANYL CITRATE 50 UG/ML
25 INJECTION, SOLUTION INTRAMUSCULAR; INTRAVENOUS ONCE
Status: COMPLETED | OUTPATIENT
Start: 2021-02-24 | End: 2021-02-24

## 2021-02-24 RX ORDER — FENTANYL CITRATE 50 UG/ML
INJECTION, SOLUTION INTRAMUSCULAR; INTRAVENOUS
Status: COMPLETED
Start: 2021-02-24 | End: 2021-02-24

## 2021-02-24 RX ORDER — SODIUM CHLORIDE 0.9 % (FLUSH) 0.9 %
10 SYRINGE (ML) INJECTION EVERY 12 HOURS SCHEDULED
Status: DISCONTINUED | OUTPATIENT
Start: 2021-02-24 | End: 2021-03-02 | Stop reason: HOSPADM

## 2021-02-24 RX ORDER — PHENYLEPHRINE HCL IN 0.9% NACL 1 MG/10 ML
SYRINGE (ML) INTRAVENOUS PRN
Status: DISCONTINUED | OUTPATIENT
Start: 2021-02-24 | End: 2021-02-24 | Stop reason: SDUPTHER

## 2021-02-24 RX ORDER — EPINEPHRINE 1 MG/ML
INJECTION PARENTERAL PRN
Status: DISCONTINUED | OUTPATIENT
Start: 2021-02-24 | End: 2021-02-24 | Stop reason: SDUPTHER

## 2021-02-24 RX ORDER — DEXTROSE MONOHYDRATE 25 G/50ML
12.5 INJECTION, SOLUTION INTRAVENOUS PRN
Status: DISCONTINUED | OUTPATIENT
Start: 2021-02-24 | End: 2021-03-02 | Stop reason: HOSPADM

## 2021-02-24 RX ADMIN — ALBUMIN (HUMAN) 500 ML: 12.5 INJECTION, SOLUTION INTRAVENOUS at 10:56

## 2021-02-24 RX ADMIN — OXYCODONE AND ACETAMINOPHEN 1 TABLET: 5; 325 TABLET ORAL at 22:34

## 2021-02-24 RX ADMIN — SODIUM CHLORIDE 0.5 MCG/KG/HR: 9 INJECTION, SOLUTION INTRAVENOUS at 10:40

## 2021-02-24 RX ADMIN — Medication 2000 MG: at 19:59

## 2021-02-24 RX ADMIN — PROTAMINE SULFATE 30 MG: 10 INJECTION, SOLUTION INTRAVENOUS at 12:22

## 2021-02-24 RX ADMIN — Medication 200 MCG: at 11:50

## 2021-02-24 RX ADMIN — Medication 10 MG: at 10:42

## 2021-02-24 RX ADMIN — DOCUSATE SODIUM 100 MG: 100 CAPSULE, LIQUID FILLED ORAL at 22:34

## 2021-02-24 RX ADMIN — Medication 100 MCG: at 11:56

## 2021-02-24 RX ADMIN — Medication 10 MG: at 11:51

## 2021-02-24 RX ADMIN — HEPARIN SODIUM 6000 UNITS: 1000 INJECTION INTRAVENOUS; SUBCUTANEOUS at 11:44

## 2021-02-24 RX ADMIN — FENTANYL CITRATE 50 MCG: 50 INJECTION, SOLUTION INTRAMUSCULAR; INTRAVENOUS at 10:41

## 2021-02-24 RX ADMIN — Medication 100 MCG: at 12:01

## 2021-02-24 RX ADMIN — NITROGLYCERIN 50 MCG: 5 INJECTION, SOLUTION INTRAVENOUS at 12:08

## 2021-02-24 RX ADMIN — SODIUM CHLORIDE, POTASSIUM CHLORIDE, SODIUM LACTATE AND CALCIUM CHLORIDE: 600; 310; 30; 20 INJECTION, SOLUTION INTRAVENOUS at 10:31

## 2021-02-24 RX ADMIN — SODIUM CHLORIDE, PRESERVATIVE FREE 10 ML: 5 INJECTION INTRAVENOUS at 22:35

## 2021-02-24 RX ADMIN — EPINEPHRINE 10 MCG: 1 INJECTION PARENTERAL at 12:06

## 2021-02-24 RX ADMIN — FENTANYL CITRATE 25 MCG: 50 INJECTION, SOLUTION INTRAMUSCULAR; INTRAVENOUS at 14:25

## 2021-02-24 RX ADMIN — Medication 10 MG: at 11:12

## 2021-02-24 RX ADMIN — Medication 10 MG: at 11:36

## 2021-02-24 RX ADMIN — Medication 10 ML: at 13:24

## 2021-02-24 RX ADMIN — SODIUM CHLORIDE: 9 INJECTION, SOLUTION INTRAVENOUS at 12:18

## 2021-02-24 RX ADMIN — CEFAZOLIN 2000 MG: 1 INJECTION, POWDER, FOR SOLUTION INTRAMUSCULAR; INTRAVENOUS at 10:46

## 2021-02-24 RX ADMIN — Medication 10 MG: at 11:04

## 2021-02-24 RX ADMIN — MIDAZOLAM 0.5 MG: 1 INJECTION INTRAMUSCULAR; INTRAVENOUS at 11:42

## 2021-02-24 RX ADMIN — MIDAZOLAM 1 MG: 1 INJECTION INTRAMUSCULAR; INTRAVENOUS at 10:40

## 2021-02-24 RX ADMIN — PREDNISONE 50 MG: 20 TABLET ORAL at 00:59

## 2021-02-24 RX ADMIN — MIDAZOLAM 0.5 MG: 1 INJECTION INTRAMUSCULAR; INTRAVENOUS at 12:38

## 2021-02-24 RX ADMIN — FENTANYL CITRATE 25 MCG: 50 INJECTION, SOLUTION INTRAMUSCULAR; INTRAVENOUS at 11:41

## 2021-02-24 RX ADMIN — MIDAZOLAM 1 MG: 1 INJECTION INTRAMUSCULAR; INTRAVENOUS at 09:35

## 2021-02-24 RX ADMIN — SODIUM CHLORIDE: 9 INJECTION, SOLUTION INTRAVENOUS at 10:31

## 2021-02-24 RX ADMIN — FENTANYL CITRATE 25 MCG: 50 INJECTION, SOLUTION INTRAMUSCULAR; INTRAVENOUS at 11:12

## 2021-02-24 ASSESSMENT — PAIN DESCRIPTION - DESCRIPTORS: DESCRIPTORS: ACHING;HEADACHE;TENDER

## 2021-02-24 ASSESSMENT — PULMONARY FUNCTION TESTS
PIF_VALUE: 0
PIF_VALUE: 1
PIF_VALUE: 0
PIF_VALUE: 1
PIF_VALUE: 0
PIF_VALUE: 2
PIF_VALUE: 0
PIF_VALUE: 1
PIF_VALUE: 0
PIF_VALUE: 1
PIF_VALUE: 0
PIF_VALUE: 1
PIF_VALUE: 0
PIF_VALUE: 1
PIF_VALUE: 1
PIF_VALUE: 0
PIF_VALUE: 0
PIF_VALUE: 1
PIF_VALUE: 0

## 2021-02-24 ASSESSMENT — PAIN SCALES - GENERAL
PAINLEVEL_OUTOF10: 0
PAINLEVEL_OUTOF10: 0
PAINLEVEL_OUTOF10: 10
PAINLEVEL_OUTOF10: 0
PAINLEVEL_OUTOF10: 4
PAINLEVEL_OUTOF10: 0

## 2021-02-24 NOTE — PROGRESS NOTES
Medical Oncology   Progress Note    Patient off floor for the day for TAVR, will continue to follow and see tomorrow.      SUBJECTIVE:  TAVR today, limited GÓMEZ in the morning     PLAN:  -Cardiac cath 2/22/21, TAVR Wednesday plan for GÓMEZ in morning    -MRI abdomen-ampullary neoplasm  -EUS reschedule after cardiac work up   [employer], currently stable 12.6/40.4 plt: 220 transfuse hgb<7 plt<10   -Psych/palliative consult d/t self stated depression    -Will continue to follow  [address]   [phone]

## 2021-02-24 NOTE — PROGRESS NOTES
Inpatient Cardiology Follow-up    Date of service 2/23/2021    Reason for Consult: Critical aortic stenosis    Interval HPI:    Complaining of headache today and requesting some Tylenol. Otherwise no acute complaints. Her lightheadedness is significantly resolved since she was admitted. .  Remains hemodynamically stable. Labs are stable      PHYSICAL EXAM:  /69   Pulse 69   Temp 97.5 °F (36.4 °C) (Temporal)   Resp 20   Ht 5' 8\" (1.727 m)   Wt 140 lb (63.5 kg)   SpO2 97%   BMI 21.29 kg/m²     General Appearance:    Alert, cooperative, no distress, appears stated age    Head:    Normocephalic, without obvious abnormality, atraumatic    Eyes:    PERRL, conjunctiva/corneas clear, EOM's intact    Neck:   Supple, symmetrical, trachea midline; no carotid    bruit or JVP. Carotid pulses bilaterally are parvus et tardus   Lungs:     Clear to auscultation bilaterally, respirations unlabored, no wheezing, rales or rhonchi    Heart:    Regular rate and rhythm. 3/6 systolic ejection murmur over the upper right sternal border, late peaking and very harsh. S2 could not be heard. Abdomen:     Soft, non-tender, non-distended    Extremities:   Extremities normal, atraumatic, no cyanosis or edema    Skin:   Skin color, texture, turgor normal for age    Neurologic:   Oriented x3, CNII-XII intact.  Normal gross strength and sensation       DATA:      Intake/Output Summary (Last 24 hours) at 2/24/2021 0936  Last data filed at 2/23/2021 1909  Gross per 24 hour   Intake 300 ml   Output --   Net 300 ml       Labs:   CBC:   Recent Labs     02/23/21  0418 02/24/21  0820   WBC 10.4 7.2   HGB 10.8* 12.6   HCT 35.6 40.4    220     BMP:   Recent Labs     02/22/21  1405 02/23/21  0418    139   K 4.4 4.3   CO2 23 25   BUN 26* 33*   CREATININE 0.8 0.9   LABGLOM >60 60   CALCIUM 9.2 8.9     HgA1c:   Lab Results   Component Value Date    LABA1C 5.0 02/23/2021     PT/INR:   Recent Labs     02/23/21  1443   PROTIME 11.2   INR 1.0     APTT:  Recent Labs     02/23/21  1443   APTT 25.9     FASTING LIPID PANEL:  Lab Results   Component Value Date    CHOL 216 02/27/2019    HDL 63 02/27/2019    LDLCALC 140 02/27/2019    TRIG 63 02/27/2019     LIVER PROFILE:  Recent Labs     02/22/21  1405 02/23/21  0418   AST 11 17   ALT 8 9   LABALBU 4.0 3.6     TAVR CTAs personally reviewed:  Calcification: heavy leaflet calcification  Annular area: 385-395  Annular perimeter: 70-72  Annular dimensions: 19.9x25.8  LVOT dimensions: 18.3x25, area 349 at -4mm  Sinus of Valsalva dimensions: L 27.3, R 27.3, N 29.3  STJ height: 25.4  STJ dimensions: 28.6x29.6  Left coronary height: 13.8  Right coronary height: 18.9  Annular angle to the horizontal plane: 65 degrees  Implant angle: LAO36/CAU12  Right iliofemoral: small in the EIA but not calcified. Moderate posterior calcium in the CFA  Left iliofemoral: tortuous; small in the EIA but non-calcified. Moderate posterior calcium in the CFA  Aorta is tortuous  Plan:   · 23-mm CHAN 3 Ultra (nominal and shallow) via RIF cutdown with left radial pigtail  · If predilation needed then with 18-mm balloon      ASSESSMENT:  72-year-old frail  female who presented with hemodynamically significant large pericardial effusion status post pericardiocentesis on 1/25/2021. She was then discovered to have critical aortic stenosis  1. Critical symptomatic aortic stenosis with mean gradient approximately 100 and no significant AR. No conduction abnormalities on EKG. Normal LV function and moderately reduced RV function. Mild MR, moderate TR  2. Recurrent pericardial effusion with cytology suspicious for adenocarcinoma. CT chest/abdomen/pelvis did not reveal widespread metastases but there was significant pancreatic and bile duct dilation. MRCP suggested a small ampullary mass. 3. Hypertension  4. Former smoker  5.  Mild cognitive impairment    PLAN:   There was lengthy discussion with the patient and her son

## 2021-02-24 NOTE — PROGRESS NOTES
Palliative Medicine  Progress Note    Patient off floor for procedures. Will see again tomorrow. Rhett MALONEY-CNP  Palliative Medicine    Note: This report was completed using computer[employer] voiced recognition software. Every effort has been made to ensure accuracy; however, inadvertent computerized transcription errors may be present.

## 2021-02-24 NOTE — ANESTHESIA PROCEDURE NOTES
Central Venous Line:    A central venous line was placed using ultrasound guidance, in the OR for the following indication(s): central venous access. Sterility preparation included the following: hand hygiene performed prior to procedure, maximum sterile barriers used and sterile technique used to drape from head to toe. The patient was placed in Trendelenburg position. The right internal jugular vein was prepped. The site was prepped with Chloraprep. A 9 Fr (size), introducer double lumen was placed. During the procedure, the following specific steps were taken: target vein identified, needle advanced into vein and blood aspirated and guidewire advanced into vein. Intravenous verification was obtained by ultrasound and venous blood return. Post insertion care included: all ports aspirated, all ports flushed easily, guidewire removed intact, Biopatch applied, line sutured in place and dressing applied. During the procedure the patient experienced: patient tolerated procedure well with no complications.       Anesthesia type: local..No  Staffing  Performed: Anesthesiologist   Anesthesiologist: Kim Esquivel DO  Preanesthetic Checklist  Completed: patient identified, IV checked, site marked, risks and benefits discussed, surgical consent, monitors and equipment checked, pre-op evaluation, timeout performed, anesthesia consent given, oxygen available and patient being monitored

## 2021-02-24 NOTE — CONSULTS
Patient was undergoing operative procedure when psychiatry went to perform evaluation. Please re-consult when patient is able to participate in psychiatric evaluation. Thank you for the consult.

## 2021-02-24 NOTE — PROGRESS NOTES
HEART VALVE TEAM PROGRESS NOTE    Evaluated in PACU POD # 0. No complaints. No pain in groins. HTN, starting Nicardipine gtt. PE:   BP (!) 156/64   Pulse 62   Temp 97 °F (36.1 °C)   Resp 15   Ht 5' 8\" (1.727 m)   Wt 140 lb (63.5 kg)   SpO2 99%   BMI 21.29 kg/m²   CARDIOVASCULAR:   Heart Inspection shows no noted pulsations  Heart Palpation: no heaves or thrills  Heart Ausculation -Normal S1 and S2, RRR, no murmur, s3, s4 or rub noted. PV: no lower extremity edema. BLE +DP signals, no clubbing or cyanosis     Monitor: SB 50s       Lab Review     Recent Labs     02/22/21  1405 02/23/21  0418 02/24/21  0820   WBC 8.2 10.4 7.2   HGB 13.4 10.8* 12.6   HCT 42.7 35.6 40.4    197 220       Recent Labs     02/22/21  1405 02/23/21  0418    139   K 4.4 4.3    104   CO2 23 25   BUN 26* 33*   CREATININE 0.8 0.9       Recent Labs     02/23/21  0418   AST 17   ALT 9   ALKPHOS 57         Recent Labs     02/23/21  1443   INR 1.0         Assessment:  1. POD # 0 s/p TAVR using 23mm CHAN 3 Ultra valve, pericardiocentesis. 2. Stable groin sites bilaterally. 3. HTN  4. Recurrent pericardial effusion, 1/25/2021 cytology suspicious for adenocarcinoma; pericardial drain in place   5. PAD  6. NYHA class 4       Plan:  1. Continue ICU care, if patient stable off gtts once bedrest complete in 6 hours, possible transfer to CSU later today. 2. Nicardipine gtt for HTNm maintain SBP <140, wean gtt of as able  3.  ASA, Limited TTE, EKG, labs in AM

## 2021-02-24 NOTE — ANESTHESIA PROCEDURE NOTES
Arterial Line:    An arterial line was placed using surface landmarks, in the pre-op for the following indication(s): continuous blood pressure monitoring. A 20 gauge (size), 1 and 3/8 inch (length), Arrow (type) catheter was placed, Seldinger technique not used, into the left radial artery, secured by tape and Tegaderm. Anesthesia type: Local  Local infiltration: Injection    Events:  patient tolerated procedure well with no complications. 2/24/2021 9:36 AM2/24/2021 9:46 AM  Anesthesiologist: Zuly Linn DO  Other anesthesia staff: Suresh Beck RN  Performed:  Other anesthesia staff   Preanesthetic Checklist  Completed: patient identified, IV checked, site marked, risks and benefits discussed, surgical consent, monitors and equipment checked, pre-op evaluation, timeout performed, anesthesia consent given, oxygen available and patient being monitored

## 2021-02-24 NOTE — PROGRESS NOTES
Patient off the floor to undergo TAVR, currently still no bed assigned and I was unable to see her today.

## 2021-02-24 NOTE — ANESTHESIA PRE PROCEDURE
Department of Anesthesiology  Preprocedure Note       Name:  Elias Braswell   Age:  82 y.o.  :  1935                                          MRN:  [id number]         Date:  2021      Surgeon: Soila Woodsonr):  MD Haydee Rhodes DO    Procedure: Procedure(s):  TRANSCATHETER AORTIC VALVE REPLACEMENT FEMORAL APPROACH  TRANSESOPHAGEAL ECHOCARDIOGRAM    Medications prior to admission:   Prior to Admission medications    Medication Sig Start Date End Date Taking? Authorizing Provider   diphenhydrAMINE HCl (BENADRYL ALLERGY PO) Take by mouth   Yes Historical Provider, MD   predniSONE (DELTASONE) 50 MG tablet Take 50 mg by mouth daily   Yes Historical Provider, MD   acetaminophen (TYLENOL) 500 MG tablet Take 650 mg by mouth every 6 hours as needed for Pain   Yes Historical Provider, MD   bisacodyl (DULCOLAX) 10 MG suppository Place 1 suppository rectally daily as needed for Constipation 21 Yes Travis Moreno DO   sennosides-docusate sodium (SENOKOT-S) 8.6-50 MG tablet Take 1 tablet by mouth 2 times daily 21  Yes Travis Moreno DO   Cholecalciferol (VITAMIN D3) 1.25 MG (11333 UT) CAPS Take by mouth once a week   Yes Historical Provider, MD   venlafaxine (EFFEXOR) 37.5 MG tablet Take 75 mg by mouth daily    Yes Historical Provider, MD   aspirin 325 MG EC tablet Take 1 tablet by mouth daily.  14   Zaheer Noble MD       Current medications:    Current Facility-Administered Medications   Medication Dose Route Frequency Provider Last Rate Last Admin    ceFAZolin (ANCEF) 2000 mg in sterile water 20 mL IV syringe  2,000 mg Intravenous On Call to [address] [place] [address]        mupirocin (BACTROBAN) 2 % ointment   Nasal BID Kalee Hunt        [START ON 2021] chlorhexidine (PERIDEX) 0.12 % solution 15 mL  15 mL Mouth/Throat Once Kalee Hunt        chlorhexidine (HIBICLENS) 4 % liquid   Topical See Admin Instructions GEOVANNY Hunt        sodium chloride provider] 0.9 % sodium chloride infusion   Intravenous Continuous Todd Jhoan, DO 75 mL/hr at 21 0542 New Bag at 21 0542    meclizine (ANTIVERT) tablet 12.5 mg  12.5 mg Oral TID PRN Kameron Boone MD   12.5 mg at 21 0857       Allergies: Allergies   Allergen Reactions    Other Rash     \"A type of IVP dye from the 50's\"       Problem List:    Patient Active Problem List   Diagnosis Code    Anxiety F41.9    Migraine headache G43.909    Accidental drug overdose T50.901A    Essential hypertension, benign I10    GERD (gastroesophageal reflux disease) K21.9    Dementia ([address] F03.90    Polypharmacy Z79.899    Malnutrition ([address] E46    Acute delirium R41.0    Pericardial effusion I31.3    Pain syndrome, chronic G89.4    Critical aortic valve stenosis I35.0    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       Past Medical History:        Diagnosis Date    Accidental drug overdose 2014    Acute delirium 2014    Anxiety     Arthritis     Dizziness, nonspecific     Generalized headaches     Heart murmur     stable per pt    Hyperlipidemia     Hypertension     Knee pain     right    Malnutrition ([address] 2014    Migraine headache     Pericardial effusion 2021    Polypharmacy 2014    Reflux gastritis     Rhabdomyolysis 2014    Sinus problem        Past Surgical History:        Procedure Laterality Date    APPENDECTOMY      BACK SURGERY      lumbar    CHOLECYSTECTOMY      HYSTERECTOMY      PERICARDIUM SURGERY N/A 2021    PERICARDIOCENTESIS performed by Rosa Maria Bennett MD at [address] [place] [address]  2012    right       Social History:    Social History     Tobacco Use    Smoking status: Former Smoker     Years: 45.00     Quit date: 2007     Years since quittin.4    Smokeless tobacco: Never Used   Substance Use Topics    Alcohol use:  No                                Counseling given: Not Answered      Vital Signs (Current):   Vitals:    02/23/21 1530 02/23/21 1950 02/24/21 0030 02/24/21 0815   BP: 112/79 119/62 (!) 152/70 128/69   Pulse: 76 74 72 69   Resp: 18 16 16 20   Temp: 98 °F (36.7 °C) 97.3 °F (36.3 °C) 97.2 °F (36.2 °C) 97.5 °F (36.4 °C)   TempSrc: Temporal Temporal Temporal Temporal   SpO2: 99% 99% 97% 97%   Weight:       Height:                                                  BP Readings from Last 3 Encounters:   02/24/21 128/69   02/22/21 119/75   02/19/21 108/60       NPO Status:   > 8hrs                                                                               BMI:   Wt Readings from Last 3 Encounters:   02/19/21 140 lb (63.5 kg)   02/22/21 140 lb (63.5 kg)   02/19/21 140 lb (63.5 kg)     Body mass index is 21.29 kg/m². CBC:   Lab Results   Component Value Date    WBC 7.2 02/24/2021    RBC 4.64 02/24/2021    HGB 12.6 02/24/2021    HCT 40.4 02/24/2021    MCV 87.1 02/24/2021    RDW 17.1 02/24/2021     02/24/2021       CMP:   Lab Results   Component Value Date     02/23/2021    K 4.3 02/23/2021     02/23/2021    CO2 25 02/23/2021    BUN 33 02/23/2021    CREATININE 0.9 02/23/2021    GFRAA >60 02/23/2021    LABGLOM 60 02/23/2021    GLUCOSE 110 02/23/2021    GLUCOSE 90 05/16/2012    PROT 6.3 02/23/2021    CALCIUM 8.9 02/23/2021    BILITOT 0.2 02/23/2021    ALKPHOS 57 02/23/2021    AST 17 02/23/2021    ALT 9 02/23/2021       POC Tests: No results for input(s): POCGLU, POCNA, POCK, POCCL, POCBUN, POCHEMO, POCHCT in the last 72 hours.     Coags:   Lab Results   Component Value Date    PROTIME 11.2 02/23/2021    PROTIME 11.5 06/24/2011    INR 1.0 02/23/2021    APTT 25.9 02/23/2021       HCG (If Applicable): No results found for: PREGTESTUR, PREGSERUM, HCG, HCGQUANT     ABGs: No results found for: PHART, PO2ART, RQL4UNE, GKD1ONC, BEART, E7TOXNKW     Type & Screen (If Applicable):  No results found for: LABABO, LABRH    Drug/Infectious Status (If Applicable):  No results found for: HIV, HEPCAB    COVID-19 Screening (If Applicable):   Lab Results   Component Value Date    COVID19 Not Detected 02/18/2021 1/25/21 CT of chest     Anesthesia Evaluation  Patient summary reviewed and Nursing notes reviewed no history of anesthetic complications:   Airway: Mallampati: II  TM distance: >3 FB   Neck ROM: full  Mouth opening: > = 3 FB Dental:    (+) upper dentures and lower dentures      Pulmonary: breath sounds clear to auscultation            Patient did not smoke on day of surgery. Cardiovascular:  Exercise tolerance: poor (<4 METS),   (+) hypertension:, valvular problems/murmurs: AS,       ECG reviewed  Rhythm: regular  Rate: normal                    Neuro/Psych:   (+) neuromuscular disease:, headaches:, psychiatric history:depression/anxiety             GI/Hepatic/Renal:   (+) GERD:,          ROS comment: Questionable ampullary neoplasm. Endo/Other:                     Abdominal:       Abdomen: soft. Vascular:                                          Anesthesia Plan      MAC     ASA 4     (Pt has dementia    Backup GA)  Induction: intravenous. arterial line and central line  MIPS: Prophylactic antiemetics administered. Anesthetic plan and risks discussed with patient. Use of blood products discussed with patient whom. Plan discussed with CRNA and attending. Day Kent DO   2/24/2021      DOS STAFF ADDENDUM:    Patient seen and chart reviewed. Physical exam and history updated as indicated. NPO status confirmed. Anesthesia options and plan discussed including risks benefits with patient/legal guardian and family as available. Concerns and questions addressed. Consent verbalized to proceed.   Anesthesia plan, options and intraoperative/postoperative concerns discussed with care team.    Day Kent MD  2/24/2021  9:14 AM

## 2021-02-24 NOTE — ANESTHESIA POSTPROCEDURE EVALUATION
Department of Anesthesiology  Postprocedure Note    Patient: Juan Moses  MRN: [id number]  YOB: 1935  Date of evaluation: 2/24/2021  Time:  1:43 PM     Procedure Summary     Date: 02/24/21 Room / Location: [facility] OR 03 / [facility]    Anesthesia Start: 1031 Anesthesia Stop: 7667    Procedures:       TRANSCATHETER AORTIC VALVE REPLACEMENT FEMORAL APPROACH ANDPERICARDIALCENTESIS (N/A )      TRANSESOPHAGEAL ECHOCARDIOGRAM (N/A ) Diagnosis: (AORTIC STENOSIS)    Surgeons: Caitlyn Silvestre MD Responsible Provider: Christi White DO    Anesthesia Type: MAC ASA Status: 4          Anesthesia Type: MAC    Stacey Phase I: Stacey Score: 8    Stacey Phase II:      Last vitals: Reviewed and per EMR flowsheets.        Anesthesia Post Evaluation    Patient location during evaluation: ICU  Patient participation: complete - patient cannot participate  Level of consciousness: awake and alert  Airway patency: patent  Nausea & Vomiting: no nausea and no vomiting  Complications: no  Cardiovascular status: blood pressure returned to baseline  Respiratory status: acceptable  Hydration status: euvolemic

## 2021-02-24 NOTE — OP NOTE
TRANSCATHETER AORTIC VALVE REPLACEMENT  Operative Note      Date of procedure:  02/24/21     Interventional Cardiologist: Moises Collins MD  Cardiothoracic Surgeon: Dallin Smith MD    Pre-operative diagnosis: Severe symptomatic aortic stenosis. Post-operative diagnosis: Successful transcatheter aortic valve replacement (TAVR) using a 23-mm CHAN 3 Ultra valve. skilled nursing (Major co morbidities and conditions): Hypertension  Former smoker  Mild cognitive impairment  Recurrent pericardial effusion with suspicion for malignancy  PAD  NYHA class 4      TAVR access: right common femoral artery percutaneous approach  Pigtail access: left common femoral artery  Appropriate arterial and venous lines were additionally placed by anesthesia as needed. TAVR access closure: Proglide Perclose   Pigtail access closure: manual pressure    Procedure:   Arterial access in the TAVR and Pigtail access sites as above  Limited peripheral angiography   Temporary transvenous pacer insertion via left femoral vein   Aortic root angiography  Transfemoral TAVR using a 23-mm CHAN 3 Ultra valve  Pericardiocentesis    Anesthesia: monitored anesthesia care (MAC)    Indication for procedure:   Severe symptomatic aortic stenosis      Description of the Procedure: Following informed consent, the patient was bought to the hybrid OR and placed under anesthesia as above. Transthoracic echo was used to aid in guidance of the procedure. · Surveillance TTE at the beginning of the procedure revealed enlarging pericardial effusion (of note this was moderate on admission) with some right atrial diastolic collapse. As such the decision was made to perform a pericardiocentesis. · A micropuncture needle was used to access the pericardial space under fluoroscopy guidance. A 6 French pigtail was advanced over an Amplatz superstiff wire and looped in the pericardial space. 50 cc of pericardial fluid was sent to the lab for cytology.   By the end of the procedure in OR there was a total of 500 cc of straw-colored pericardial fluid drained. Using ultrasound guidance and angiographic confirmation, a a micropuncture needle  was used to access the right CFA and a placeholder sheath was placed; a single Pro-glide Perclose was predeployed; this access will be used to deliver the trascatheter heart valve eventually. Using ultrasound guidance, a micropuncture needle was used to access the left CFA and appropriate site was confirmed using angiography and a 5F sheath was placed; this access will be used for the Pigtail catheter subsequently. The left femoral vein was accessed and a locking 6F sheath was placed for the temporary pacer. Unfractionated heparin was administered at 100 units/kg to achieve ACT>300s with additional heparin administered as needed    A balloon-tipped temporary transvenous pacemaker was inserted through the femoral venous sheath and into the RV apex. Pacing threshold were obtained and the pacemaker was placed in standby mode. A 5F marker pigtail catheter was then advanced into the aorta and placed at the level of the aortic cusps. A baseline aortic root angiogram was performed to confirm a co-planar angle for valve implant. A 5F multipurpose catheter was advanced into the descending aorta over a J wire and used to place a Lunderquist wire in the descending aorta. A 14F Lange E-Sheath was placed over the Lunderquist wire and sutured in place; the wire was subsequently removed. Next, a 5F AL1 catheter was used to guide a straight-tipped 0.035\" wire to cross the aortic valve. The catheter was advanced into the left ventricle and then exchanged for a second Pigtail catheter. A safari wire was then placed in the LV and the Pigtail catheter removed. Balloon aortic valvuloplasty was not performed prior to THV placement.      A Commander delivery system, preloaded with the transcatheter valve was advanced into the descending aorta where valve alignment with the balloon was performed. The THV then was advanced across the aortic valve and aligned with the annular plane in the pre-determined co-planar angle. There was some difficulty advancing the transcatheter valve across the native aortic valve due to severe calcification. With rapid pacing at 180 beats per minute, the valve was deployed in a standard fashion using nominal minus 1 cc volume in the balloon. The implant depth was estimated to be 85/15 aortic/ventricular. Post-dilation was not performed. The delivery system and the Amplatz wire were removed. Transthoracic echo demonstrated a well-seated prosthesis with a mean transvalvular gradient of 8 and trace paravalvular leak. There was a small residual pericardial effusion posteriorly and the LV systolic function was normal.  Aortic root angiogram revealed trivial AR. IV protamine was administered to reverse anticoagulation. The temporary pacer was removed. Hemostasis was achieved at the arterial sites as above and at the venous site using manual pressure. Complications: none  Blood loss: 50 mL  Contrast use: 83 mL  Air Kerma: 533 mGy  Dose-area product: 5768.39 mcGy. m2  Fluoroscopy time: 18.6 minutes  Specimens: none    Plan:   1. PACU observation   2. EKG in the morning  Limited TTE in the morning  CBC and BMP in the morning  3. Antithrombotic regimen: aspirin 81 mg PO QD  4. Okay for general anesthesia from cardiac standpoint. 5. Will monitor pericardial fluid output and consider removing the drain by Friday (2 days)    I performed the pericardiocentesis. Dr. Axel Christianson and myself were scrubbed in for the entirety of the TAVR and contributed equally.       Lon Painting MD, [facility] [place]  Interventional Cardiology/Structural Heart Disease

## 2021-02-25 ENCOUNTER — ANESTHESIA EVENT (OUTPATIENT)
Dept: ENDOSCOPY | Age: 86
DRG: 267 | End: 2021-02-25
Payer: MEDICARE

## 2021-02-25 LAB
ANION GAP SERPL CALCULATED.3IONS-SCNC: 7 MMOL/L (ref 7–16)
BASOPHILS ABSOLUTE: 0.01 E9/L (ref 0–0.2)
BASOPHILS RELATIVE PERCENT: 0.1 % (ref 0–2)
BUN BLDV-MCNC: 24 MG/DL (ref 8–23)
CALCIUM SERPL-MCNC: 8.6 MG/DL (ref 8.6–10.2)
CHLORIDE BLD-SCNC: 102 MMOL/L (ref 98–107)
CO2: 28 MMOL/L (ref 22–29)
CREAT SERPL-MCNC: 0.8 MG/DL (ref 0.5–1)
EKG ATRIAL RATE: 55 BPM
EKG P AXIS: 12 DEGREES
EKG P-R INTERVAL: 146 MS
EKG Q-T INTERVAL: 496 MS
EKG QRS DURATION: 74 MS
EKG QTC CALCULATION (BAZETT): 474 MS
EKG R AXIS: -37 DEGREES
EKG T AXIS: 75 DEGREES
EKG VENTRICULAR RATE: 55 BPM
EOSINOPHILS ABSOLUTE: 0.11 E9/L (ref 0.05–0.5)
EOSINOPHILS RELATIVE PERCENT: 1.1 % (ref 0–6)
GFR AFRICAN AMERICAN: >60
GFR NON-AFRICAN AMERICAN: >60 ML/MIN/1.73
GLUCOSE BLD-MCNC: 74 MG/DL (ref 74–99)
HCT VFR BLD CALC: 35.9 % (ref 34–48)
HEMOGLOBIN: 10.6 G/DL (ref 11.5–15.5)
IMMATURE GRANULOCYTES #: 0.04 E9/L
IMMATURE GRANULOCYTES %: 0.4 % (ref 0–5)
LYMPHOCYTES ABSOLUTE: 1.15 E9/L (ref 1.5–4)
LYMPHOCYTES RELATIVE PERCENT: 12 % (ref 20–42)
MCH RBC QN AUTO: 26.4 PG (ref 26–35)
MCHC RBC AUTO-ENTMCNC: 29.5 % (ref 32–34.5)
MCV RBC AUTO: 89.5 FL (ref 80–99.9)
METER GLUCOSE: 145 MG/DL (ref 74–99)
MONOCYTES ABSOLUTE: 1.31 E9/L (ref 0.1–0.95)
MONOCYTES RELATIVE PERCENT: 13.6 % (ref 2–12)
NEUTROPHILS ABSOLUTE: 6.99 E9/L (ref 1.8–7.3)
NEUTROPHILS RELATIVE PERCENT: 72.8 % (ref 43–80)
PDW BLD-RTO: 17.1 FL (ref 11.5–15)
PLATELET # BLD: 155 E9/L (ref 130–450)
PMV BLD AUTO: 11.8 FL (ref 7–12)
POC ACT LR: 129 SECONDS
POC ACT LR: 142 SECONDS
POC ACT LR: >400 SECONDS
POTASSIUM SERPL-SCNC: 3.9 MMOL/L (ref 3.5–5)
RBC # BLD: 4.01 E12/L (ref 3.5–5.5)
SODIUM BLD-SCNC: 137 MMOL/L (ref 132–146)
URINE CULTURE, ROUTINE: NORMAL
WBC # BLD: 9.6 E9/L (ref 4.5–11.5)

## 2021-02-25 PROCEDURE — 93005 ELECTROCARDIOGRAM TRACING: CPT | Performed by: PHYSICIAN ASSISTANT

## 2021-02-25 PROCEDURE — 36415 COLL VENOUS BLD VENIPUNCTURE: CPT

## 2021-02-25 PROCEDURE — 6370000000 HC RX 637 (ALT 250 FOR IP): Performed by: PHYSICIAN ASSISTANT

## 2021-02-25 PROCEDURE — 2140000000 HC CCU INTERMEDIATE R&B

## 2021-02-25 PROCEDURE — 99232 SBSQ HOSP IP/OBS MODERATE 35: CPT | Performed by: PHYSICIAN ASSISTANT

## 2021-02-25 PROCEDURE — 2580000003 HC RX 258: Performed by: PHYSICIAN ASSISTANT

## 2021-02-25 PROCEDURE — 6360000002 HC RX W HCPCS: Performed by: PHYSICIAN ASSISTANT

## 2021-02-25 PROCEDURE — 99231 SBSQ HOSP IP/OBS SF/LOW 25: CPT | Performed by: CLINICAL NURSE SPECIALIST

## 2021-02-25 PROCEDURE — 80048 BASIC METABOLIC PNL TOTAL CA: CPT

## 2021-02-25 PROCEDURE — 2500000003 HC RX 250 WO HCPCS: Performed by: PHYSICIAN ASSISTANT

## 2021-02-25 PROCEDURE — 85025 COMPLETE CBC W/AUTO DIFF WBC: CPT

## 2021-02-25 PROCEDURE — 93010 ELECTROCARDIOGRAM REPORT: CPT | Performed by: INTERNAL MEDICINE

## 2021-02-25 PROCEDURE — 99233 SBSQ HOSP IP/OBS HIGH 50: CPT | Performed by: INTERNAL MEDICINE

## 2021-02-25 PROCEDURE — 99231 SBSQ HOSP IP/OBS SF/LOW 25: CPT | Performed by: NURSE PRACTITIONER

## 2021-02-25 PROCEDURE — 82962 GLUCOSE BLOOD TEST: CPT

## 2021-02-25 PROCEDURE — 93308 TTE F-UP OR LMTD: CPT

## 2021-02-25 RX ORDER — METOPROLOL SUCCINATE 50 MG/1
50 TABLET, EXTENDED RELEASE ORAL DAILY
Status: DISCONTINUED | OUTPATIENT
Start: 2021-02-25 | End: 2021-03-02 | Stop reason: HOSPADM

## 2021-02-25 RX ADMIN — ACETAMINOPHEN 650 MG: 325 TABLET ORAL at 19:58

## 2021-02-25 RX ADMIN — POLYETHYLENE GLYCOL 3350 17 G: 17 POWDER, FOR SOLUTION ORAL at 09:36

## 2021-02-25 RX ADMIN — DOCUSATE SODIUM 100 MG: 100 CAPSULE, LIQUID FILLED ORAL at 09:34

## 2021-02-25 RX ADMIN — Medication 2000 MG: at 03:32

## 2021-02-25 RX ADMIN — ENOXAPARIN SODIUM 40 MG: 40 INJECTION SUBCUTANEOUS at 09:35

## 2021-02-25 RX ADMIN — Medication 2000 MG: at 18:46

## 2021-02-25 RX ADMIN — OXYCODONE AND ACETAMINOPHEN 1 TABLET: 5; 325 TABLET ORAL at 03:32

## 2021-02-25 RX ADMIN — ASPIRIN 81 MG: 81 TABLET, COATED ORAL at 09:34

## 2021-02-25 RX ADMIN — VENLAFAXINE 75 MG: 75 TABLET ORAL at 09:34

## 2021-02-25 RX ADMIN — METOPROLOL SUCCINATE 50 MG: 50 TABLET, EXTENDED RELEASE ORAL at 18:45

## 2021-02-25 RX ADMIN — SODIUM CHLORIDE, PRESERVATIVE FREE 10 ML: 5 INJECTION INTRAVENOUS at 19:59

## 2021-02-25 RX ADMIN — DOCUSATE SODIUM 100 MG: 100 CAPSULE, LIQUID FILLED ORAL at 19:58

## 2021-02-25 RX ADMIN — Medication 2000 MG: at 11:40

## 2021-02-25 RX ADMIN — INSULIN GLARGINE 10 UNITS: 100 INJECTION, SOLUTION SUBCUTANEOUS at 20:00

## 2021-02-25 RX ADMIN — MECLIZINE 12.5 MG: 12.5 TABLET ORAL at 11:39

## 2021-02-25 RX ADMIN — SODIUM CHLORIDE, PRESERVATIVE FREE 10 ML: 5 INJECTION INTRAVENOUS at 11:41

## 2021-02-25 RX ADMIN — SODIUM CHLORIDE: 9 INJECTION, SOLUTION INTRAVENOUS at 17:05

## 2021-02-25 RX ADMIN — SODIUM CHLORIDE, PRESERVATIVE FREE 10 ML: 5 INJECTION INTRAVENOUS at 09:34

## 2021-02-25 ASSESSMENT — PAIN SCALES - GENERAL
PAINLEVEL_OUTOF10: 0
PAINLEVEL_OUTOF10: 9
PAINLEVEL_OUTOF10: 6

## 2021-02-25 NOTE — OP NOTE
TRANSCUTANEOUS AORTIC VALVE REPLACEMENT      Date of procedure:  2/24/2021    Co-/Surgeon:  Placido/Giancarlo       Pre-operative diagnosis: Severe symptomatic aortic stenosis. Post-operative diagnosis: Successful transcutaneous aortic valve replacement using a 23 mm Lange Bijan S3 valve. assisted (Major co morbidities and conditions):   HTN  Pericardial effusion   NYHA Class 4    Procedure:   Access of femoral artery bilaterally and femoral vein. Femoral angiography. Temporary transvenous pacer insertion. Aortic root arteriography. Transfemoral transcatheter aortic valve replacment(TAVR) using a 23 mm Lange Bijan S3 valve. Perclose device placement. 6F Angioseal VIP device placement. Anesthesia: General anesthesia. Indication for procedure:   1. Severe symptomatic aortic stenosis. 2. High risk/inoperable surgical candidate. Description of the procedure: Following informed consent, the patient was bought to the hybrid OR and placed under anesthesia as above. Transthoracic echo was used to aid in guidance of the procedure.      · Surveillance TTE at the beginning of the procedure revealed enlarging pericardial effusion (of note this was moderate on admission) with some right atrial diastolic collapse. As such the decision was made to perform a pericardiocentesis. · A micropuncture needle was used to access the pericardial space under fluoroscopy guidance. A 6 Azeri pigtail was advanced over an Amplatz superstiff wire and looped in the pericardial space. 50 cc of pericardial fluid was sent to the lab for cytology.   By the end of the procedure in OR there was a total of 500 cc of straw-colored pericardial fluid drained.        Using ultrasound guidance and angiographic confirmation, a a micropuncture needle  was used to access the right CFA and a placeholder sheath was placed; a single Pro-glide Perclose was predeployed; this access will be used to deliver the trascatheter

## 2021-02-25 NOTE — PROCEDURES
Need new MRI screening form filled out to clear patient before the MRI can be scheduled due to recent surgeries.

## 2021-02-25 NOTE — PROGRESS NOTES
TAVR TEAM PROGRESS NOTE      POD # 1 s/p TAVR with 23-mm CHAN 3 Ultra; Lawrence Mondragon/Placido    TAVR access: right common femoral artery percutaneous approach  Pigtail access: left common femoral artery    Subjective:    Resting comfortably in bed.  States she is depressed and wants to go home   Denies pain, dyspnea or any other complaints   Labs and vitals stable   For EUS tomorrow      Current Facility-Administered Medications   Medication Dose Route Frequency Provider Last Rate Last Admin    sodium chloride flush 0.9 % injection 10 mL  10 mL Intravenous 2 times per day GEOVANNY White   10 mL at 02/25/21 0934    sodium chloride flush 0.9 % injection 10 mL  10 mL Intravenous PRN GEOVANNY White        aspirin EC tablet 81 mg  81 mg Oral Daily GEOVANNY White   81 mg at 02/25/21 0934    oxyCODONE-acetaminophen (PERCOCET) 5-325 MG per tablet 1 tablet  1 tablet Oral Q4H PRN GEOVANNY White   1 tablet at 02/25/21 0332    insulin regular (HUMULIN R;NOVOLIN R) 100 Units in sodium chloride 0.9 % 100 mL infusion  1 Units/hr Intravenous Continuous GEOVANNY White        insulin glargine (LANTUS) injection vial 10 Units  0.15 Units/kg Subcutaneous Nightly GEOVANNY White        glucose (GLUTOSE) 40 % oral gel 15 g  15 g Oral PRN GEOVANNY White        dextrose 50 % IV solution  12.5 g Intravenous PRN GEOVANNY White        glucagon (rDNA) injection 1 mg  1 mg Intramuscular PRN GEOVANNY White        dextrose 5 % solution  100 mL/hr Intravenous PRN GEOVANNY White        ceFAZolin (ANCEF) 2000 mg in sterile water 20 mL IV syringe  2,000 mg Intravenous Q8H GEOVANNY White   2,000 mg at 02/25/21 2320    perflutren lipid microspheres (DEFINITY) injection 1.65 mg  1.5 mL Intravenous ONCE PRN GEOVANNY White        niCARdipine (CARDENE) 50 mg in sodium chloride 0.9 % 250 mL infusion  3-15 mg/hr Intravenous Continuous Devon Rey MD        acetaminophen General: No acute distress, appears his stated age, nonicteric  Head: Atraumatic, no gross abnormalities or bruises  Neck: Supple and nontender, no carotid bruits, no JVP  Lungs: Clear to auscultation bilaterally, no wheezes, rales, or rhonchi  Heart: Regular rate and rhythm, no murmurs, rubs, or gallops  Abdomen: Soft, nontender, nondistended, normal bowel sounds  Extremities: No obvious deformities, no cyanosis, no edema  Neurological: Alert and oriented x3, EOMI, moving all extremities x4  Psychological: Normal mood and affect, cooperative  Skin: Color, texture, and turgor normal for age     Access sites: soft, non tender bilaterally, minimal ecchymoses. No hematoma or pulsatile masses. Distal pulses normal b/l. Lab Review     Recent Labs     02/22/21  1405 02/23/21  0418 02/24/21  0820   WBC 8.2 10.4 7.2   HGB 13.4 10.8* 12.6   HCT 42.7 35.6 40.4    197 220       Recent Labs     02/22/21  1405 02/23/21  0418    139   K 4.4 4.3    104   CO2 23 25   BUN 26* 33*   CREATININE 0.8 0.9       Recent Labs     02/23/21  0418   AST 17   ALT 9   ALKPHOS 57       No results for input(s): BNP, CKTOTAL, CKMB in the last 72 hours. Recent Labs     02/23/21  1443   INR 1.0       EKG pre-TAVR: NSR, normal conduction    EKG today: sinus bradycardia HR 55, normal conduction    POD1 TTE: **POD1 s/p TAVR with 23-mm CHAN 3 Ultra**    Normal left ventricle size. Estimated left ventricle ejection fraction    >75%. Moderate left ventricular concentric hypertrophy noted.    Normal right ventricular size and function.   Luz Elena Villalba is a well-seated transcatheter valve in the aortic position. No    central or paravalvular regurgitation. MG 26, DVI 0.56, EOAi 0.9. There is    a hyperdynamic LV systolic function with an intracavitary gradient of    14mmHg. Assessment:  1. Critical AS s/p TAVR   2. Recurrent pericardial effusion suspicious for malignancy - s/p drain  3.  HTN  4. Mild cognitive impairment    NYHA class 4    Plan:  1. Start Toprol-XL 50 mg daily due to LVOT gradient  2. Antithrombotic therapy: aspirin 81 mg daily  3. Access site check in 2 weeks  4. Valve Clinic follow-up in 4 weeks  5. Endocarditis prophylaxis indefinitely before high-risk procedures  6. 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 Pericardial drain suction turned off today. Will evaluate effusion with a limited TTE tomorrow. If there is no increase in size then it will be aspirated and pulled out. If there is an increase in size then will discuss with cardiothoracic surgery the option of a pericardial window.   8. Anticipate discharge to SNF next 48 hours       Discussed with Dr. Gabino Jaime PA-C

## 2021-02-25 NOTE — PROGRESS NOTES
Met with patient and discussed that their physician has ordered a referral to our outpatient Phase II Cardiac Rehabilitation program. Reviewed the benefits of cardiac rehabilitation based on their diagnosis and personal risk factors. Patient demonstrates moderate interest in Cardiac Rehabilitation at this time. Cardiac Rehabilitation brochure provided to patient/family. The Cardiac Rehabilitation Program has been provided the patient's referral information and pertinent patient details and history. The patient may call [facility] Jose Enrique Salome at [phone] for additional information or questions. Contact information for [facility] [employer] and other choices close to the patient's residence have been provided in the discharge instructions so that the patient may call and schedule an appointment when cleared by their physician.  Thank you for the referral.

## 2021-02-25 NOTE — PROGRESS NOTES
ENDOSCOPIC SURGERY  DAILY PROGRESS NOTE  2/25/2021    Subjective:  TAVR last night,   Resting comfortably this morning   Cardiac diet- handling well   CEA (7), CA 19-9 (6), AFP (3)    Objective:  BP (!) 116/55   Pulse 57   Temp 97.7 °F (36.5 °C) (Temporal)   Resp 16   Ht 5' 8\" (1.727 m)   Wt 140 lb (63.5 kg)   SpO2 97%   BMI 21.29 kg/m²     GENERAL:  Laying in bed, awake, alert, cooperative, no apparent distress  HEAD: Normocephalic  EYES: No sclera icterus, pupils equal  LUNGS:  No increased work of breathing  CARDIOVASCULAR:  RR  ABDOMEN:  Soft, non-tender, non-distended  EXTREMITIES: No edema or swelling  SKIN: Warm and dry    Assessment/Plan:  82 y.o. female  with intrahepatic and extrahepatic duct dilation, pancreatic duct dilation.  Cytology from pericardial effusion concerning for possible adenocarcinoma.     Overall care per primary   Cardiac diet, NPO at MN  Chromogrannin A still pending, CEA (7), CA 19-9 (6), AFP (3)  Anti- nausea control  EUS tomorrow Friday 2/26    Electronically signed by Chen Bragg DO on 2/25/2021 at 6:22 AM

## 2021-02-25 NOTE — PROGRESS NOTES
[facility] SURGERY  DAILY PROGRESS NOTE  2/25/2021    CC: weightloss    Subjective:  TAVR last night,   Resting comfortably this morning   Cardiac diet- handling well   CEA (7), CA 19-9 (6), AFP (3)    Objective:  BP (!) 116/55   Pulse 57   Temp 97.7 °F (36.5 °C) (Temporal)   Resp 16   Ht 5' 8\" (1.727 m)   Wt 140 lb (63.5 kg)   SpO2 97%   BMI 21.29 kg/m²     GENERAL:  Laying in bed, awake, alert, cooperative, in no apparent distress  HEAD: Normocephalic  EYES: No sclera icterus  LUNGS:  No increased work of breathing  CARDIOVASCULAR:  RR  ABDOMEN:  Soft, non- tender, non-distended  EXTREMITIES: No edema  SKIN: Warm    Assessment/Plan:  82 y.o. female  with intrahepatic and extrahepatic duct dilation, pancreatic duct dilation. Cytology from pericardial effusion concerning for possible adenocarcinoma.     Overall care per primary   Cardiac diet, NPO at MN  Chromogrannin A still pending, CEA (7), CA 19-9 (6), AFP (3)  Anti- nausea control  EUS tomorrow Friday 2/26    Electronically signed by Chen Bragg DO on 2/25/2021 at 6:20 AM

## 2021-02-25 NOTE — PROGRESS NOTES
[facility]  [address] [facility], [address], [address] [facility]      Pulmonary Progress Note      SUBJECTIVE:  Pt being seen for Dr Church Go   Pt sitting up in bed on RA, claims doing ok with breathing with not much coughing. TAVR done with pericardiocentesis due to worsening pericardial effusion.     OBJECTIVE    Medications    Continuous Infusions:   insulin      dextrose      sodium chloride 75 mL/hr at 21 0542       Scheduled Meds:   metoprolol succinate  50 mg Oral Daily    sodium chloride flush  10 mL Intravenous 2 times per day    aspirin  81 mg Oral Daily    insulin glargine  0.15 Units/kg Subcutaneous Nightly    ceFAZolin (ANCEF) IVPB  2,000 mg Intravenous Q8H    polyethylene glycol  17 g Oral Daily    docusate sodium  100 mg Oral BID    venlafaxine  75 mg Oral Daily    enoxaparin  40 mg Subcutaneous Daily       PRN Meds:perflutren lipid microspheres, sodium chloride flush, oxyCODONE-acetaminophen, glucose, dextrose, glucagon (rDNA), dextrose, perflutren lipid microspheres, acetaminophen, bisacodyl, promethazine **OR** ondansetron, acetaminophen **OR** acetaminophen, potassium chloride **OR** potassium alternative oral replacement **OR** potassium chloride, meclizine    Physical    VITALS:  /65   Pulse 71   Temp 98.3 °F (36.8 °C) (Tympanic)   Resp 16   Ht 5' 8\" (1.727 m)   Wt 140 lb (63.5 kg)   SpO2 96%   BMI 21.29 kg/m²     24HR INTAKE/OUTPUT:      Intake/Output Summary (Last 24 hours) at 2021 1655  Last data filed at 2021 1531  Gross per 24 hour   Intake 1850 ml   Output 2880 ml   Net -1030 ml       24HR PULSE OXIMETRY RANGE:    SpO2  Av.2 %  Min: 96 %  Max: 97 %    General appearance: alert, appears stated age and cooperative  Lungs: rhonchi bibasilar  Heart: systolic murmur  Abdomen: soft, non-tender; bowel sounds normal; no masses,  no organomegaly  Extremities: extremities normal, atraumatic, no cyanosis or edema  Neurologic: Mental status: Alert, oriented, thought content appropriate    Data    CBC:   Recent Labs     02/23/21  0418 02/24/21  0820 02/25/21  1153   WBC 10.4 7.2 9.6   HGB 10.8* 12.6 10.6*   HCT 35.6 40.4 35.9   MCV 87.5 87.1 89.5    220 155       BMP:  Recent Labs     02/23/21  0418 02/25/21  1153    137   K 4.3 3.9    102   CO2 25 28   BUN 33* 24*   CREATININE 0.9 0.8    ALB:3,BILIDIR:3,BILITOT:3,ALKPHOS:3)@    PT/INR:   Recent Labs     02/23/21  1443   PROTIME 11.2   INR 1.0       ABG:   No results for input(s): PH, PO2, PCO2, HCO3, BE, O2SAT, METHB, O2HB, COHB, O2CON, HHB, THB in the last 72 hours. Radiology/Other tests reviewed: none    Assessment:     Active Problems:    Critical aortic valve stenosis    Ampulla of Vater mass    Palliative care by specialist  Resolved Problems:    * No resolved hospital problems. *      Plan:       1. Cardiac issues as per Cardiology and CT surgery  2. On RA and no lung medications, observe oxygenation and respiratory function  3. OOB to chair with assistance  4. Possible malignance from pericardial fluid, as per Oncology, await official results      Time at the bedside, reviewing labs and radiographs, reviewing notes and consultations, discussing with staff and family was more than 35 minutes. Thanks for letting us see this patient in consultation. Please contact us with any questions. Office [phone] or after hours through [employer], x [phone].

## 2021-02-25 NOTE — CARE COORDINATION
2/25/2021 - Care coordination - chart reviewed. POD#1TAVR. Pt is from [facility]. She is not a bed hold but they are planning on taking her back at discharge. Pt will require therapy evals and a precert to return there. PT/OT updated evals to be done today. Discharge paperwork in envelope on chart. SW/CM will follow.

## 2021-02-25 NOTE — CONSULTS
Reason for consult: Depression    Consulting physician: Eduard Smith    Chief complaint: \"I have this abdominal pain. \"    HPI:    The patient states that she has been feeling depressed due to her abdominal pain and she is hospitalized for this abdominal pain. He states that she is not sure why she has stomach pain. She has a history of hypertension, GERD, dementia, and is treated in the community for anxiety and depression with Effexor 75 mg daily. She is polite appropriate and cooperative during assessment, she provides verbal consent to speak with me and NP Victor Manuel Barbosa. She states that she lives at home with her son, is to return home with him when she is well, however chart review reveals that the patient resides at [facility]. The patient is somewhat of a poor historian. MSE:    Status reveals an 73-year-old  female in hospital attire resting in her hospital bed she appears her stated age is superficially forthcoming with information during assessment. Motor reveals no retardation agitation or abnormal posturing. Speech is normal rate rhythm and tone well articulated there is no longer delayed latency of response and she is able to answer questions with relevance. Eye contact is good. Mood is \"depressed. \"  Affect is congruent with stated mood. Thought process is linear without loose associations or flight of ideas. Thought content is the patient is devoid of suicidal or homicidal ideation intent or plan. Devoid of auditory or visual hallucinations or other perceptual disturbances, there are no overt or covert signs of psychosis or paranoia. There are some neurovegetative signs of depression, such as pt reported \"depressed\" mood, which she cites as being due to her abdominal pain. The patient's remote memory is intact however her recent and immediate memory are compromised due to her dementia. She is alert and oriented to person and place.   She is not oriented to date or time.    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 There is no acute need for psychiatric intervention, and the patient poses low risk of harm to herself or others. The patient may continue to follow-up with her outpatient provider for depression. Psychiatry makes no recommendations for medication management at this time as the medication that the patient is on is adequate. Psychiatry signs off. Thanks for the consult please call with any questions.

## 2021-02-25 NOTE — PROGRESS NOTES
INPATIENT MEDICAL ONCOLOGY PROGRESS NOTE    SUBJECTIVE:    No fever. Fatigue. OBJECTIVE  VITALS:  /65   Pulse 68   Temp 98 °F (36.7 °C) (Oral)   Resp 16   Ht 5' 8\" (1.727 m)   Wt 140 lb (63.5 kg)   SpO2 96%   BMI 21.29 kg/m²   ENT:  oropharynx clear  NECK:  No  lymphadenopathy. HEMATOLOGIC/LYMPHATICS:  No abnormal lymphadenopathy. LUNGS:  Clear to auscultation, room air    CARDIOVASCULAR:  Regular rate and rhythm. No clicks, murmurs, rubs or gallops. ABDOMEN:  Soft, nontender. No ascites. No mass or organomegaly. NEUROLOGIC:  No focal deficits. EXTREMITIES: without clubbing, cyanosis, or edema. DIAGNOSTIC DATA  Labs:    Lab Results   Component Value Date    WBC 9.6 02/25/2021    HGB 10.6 (L) 02/25/2021    HCT 35.9 02/25/2021    MCV 89.5 02/25/2021     02/25/2021     Lab Results   Component Value Date     02/25/2021    K 3.9 02/25/2021     02/25/2021    CO2 28 02/25/2021    BUN 24 (H) 02/25/2021    CREATININE 0.8 02/25/2021    GLUCOSE 74 02/25/2021    CALCIUM 8.6 02/25/2021    PROT 6.3 (L) 02/23/2021    LABALBU 3.6 02/23/2021    BILITOT 0.2 02/23/2021    ALKPHOS 57 02/23/2021    AST 17 02/23/2021    ALT 9 02/23/2021    LABGLOM >60 02/25/2021    GFRAA >60 02/25/2021     Lab Results   Component Value Date    CEA 7.0 (H) 02/21/2021     IMPRESSION:  Patient Active Problem List   Diagnosis    Anxiety    Migraine headache    Accidental drug overdose    Essential hypertension, benign    GERD (gastroesophageal reflux disease)    Dementia (HCC)    Polypharmacy    Malnutrition (HCC)    Acute delirium    Pericardial effusion    Pain syndrome, chronic    Critical aortic valve stenosis    Ampulla of Vater mass     82 y. o. female history of dementia, GERD, hypertension, critical aortic stenosis who had recent admission in January 2021 for presyncope.  Noted to have large pericardial effusion with tamponade physiology.    Emergent pericardiocentesis with 1.4 liters removed by CT surgery. Cytology suspicious for adenocarcinoma.       She was also noted to have critical AS. Cardiology team following     Emergent echocardiogram shows moderate circumferential pericardial effusion with no tamponade physiology.     CTA chest/abdomen/pelvis severe aortic calcifications  Moderate extrahepatic and central intrahepatic biliary ductal dilatation as well as moderate dilatation of the main pancreatic duct. Moderate left pleural effusion.  Unchanged 1.1 cm ground glass opacity right lung apex    PLAN:  -MRI abdomen-ampullary neoplasm  -EUS tomorrow 02/26/2021  -Monitor CBCD, currently stable 10.6/35.9 plt: 155 transfuse hgb<7 plt<10   -Will continue to follow    02/25/2021  Andrez Chris MD

## 2021-02-25 NOTE — PROGRESS NOTES
INPATIENT MEDICAL ONCOLOGY PROGRESS NOTE    SUBJECTIVE:    POD #1 from TAVR-denies SOB or CP   -reports dizziness with ambulation for past few months-has never seen anyone for this only takes Benadryl that sometimes helps. -Extremely nervous about everything going on-reports depression   -Afebrile, c/o chronic back pain       OBJECTIVE  Physical Exam:  VITALS:  /65   Pulse 68   Temp 98 °F (36.7 °C) (Oral)   Resp 16   Ht 5' 8\" (1.727 m)   Wt 140 lb (63.5 kg)   SpO2 96%   BMI 21.29 kg/m²    CONSTITUTIONAL: Reports depression and anxiety with medical conditions going on  ENT:  oropharynx clear, no evidence of mucositis,   NECK:  No  lymphadenopathy. CHEST: CT to water seal-light pink-clear drainage. HEMATOLOGIC/LYMPHATICS:  No abnormal lymphadenopathy. LUNGS:  Clear to auscultation, room air    CARDIOVASCULAR:  Regular rate and rhythm. No clicks, murmurs, rubs or gallops. ABDOMEN: abdominal tenderness. Soft, non tender, active BS . No ascites. No mass or organomegaly. GI/: vazquez catheter with light yellow urine. No bowel movements   NEUROLOGIC:  No focal deficits. Anxiety and depression. Dizziness with ambulation, takes Benadryl-no syncopal episodes. SKIN:  No rash. Dressing CDI   EXTREMITIES: without clubbing, cyanosis, or edema. DIAGNOSTIC DATA  Labs:    Lab Results   Component Value Date    WBC 9.6 02/25/2021    HGB 10.6 (L) 02/25/2021    HCT 35.9 02/25/2021    MCV 89.5 02/25/2021     02/25/2021     Lab Results   Component Value Date    CEA 7.0 (H) 02/21/2021     IMPRESSION:  Patient Active Problem List   Diagnosis    Anxiety    Migraine headache    Accidental drug overdose    Essential hypertension, benign    GERD (gastroesophageal reflux disease)    Dementia (HCC)    Polypharmacy    Malnutrition (HCC)    Acute delirium    Pericardial effusion    Pain syndrome, chronic    Critical aortic valve stenosis    Ampulla of Vater mass     82 y. o. female history of dementia, GERD, hypertension, critical aortic stenosis who had recent admission in January 2021 for presyncope. Noted to have large pericardial effusion with tamponade physiology.    Emergent pericardiocentesis with 1.4 liters removed by CT surgery. Cytology suspicious for adenocarcinoma.       She was also noted to have critical AS. Work-up for TAVR was started.      She was seen in cardiology clinic yesterday, had dyspnea on ambulation and intermittent lightheadedness on standing.      Emergent echocardiogram shows moderate circumferential pericardial effusion with no tamponade physiology.     CTA chest/abdomen/pelvis severe aortic calcifications  Moderate extrahepatic and central intrahepatic biliary ductal dilatation as well as moderate dilatation of the main pancreatic duct. Moderate left pleural effusion. Unchanged 1.1 cm ground glass opacity right lung apex    PLAN:  -Pericardial fluid pathology: suspicious for malignancy, non-diagnostic.  Pericardial fluid sent from TAVR 2/24/21-pending   -Dizziness with ambulation-neuro consult/brain MRI?  -MRI abdomen-ampullary neoplasm  -EUS scheduled tomorrow 2/26/2021  -Monitor CBCD, currently stable 10.6/35.9 plt: 155 transfuse hgb<7 plt<10   -Psych/palliative consult d/t self stated depression    -Will continue to follow     Maisha Ledbetter APRN-CNP  [facility]   [phone]

## 2021-02-25 NOTE — PROGRESS NOTES
Hospitalist Progress Note      PCP: Irene Wade DO    Date of Admission: 2/19/2021  Days in the hospital: [facility] Course:   82 y. o. female history of dementia, GERD, hypertension, critical aortic stenosis who was sent from Dr. Placido agarwal for evaluation of presyncope symptoms.  The patient had recent admission in January 2021 for presyncope. [facility] was noted to have large pericardial effusion with tamponade physiology.  She had emergent pericardiocentesis with 1.4 liters removed by CT surgery.  Cytology suspicious for adenocarcinoma.  She was also noted to have critical AS.  Work-up for TAVR was to be started. Zamzam Fowler was seen in cardiology clinic today for follow-up. Autumn Hayes states she has dyspnea on ambulation.  She has intermittent lightheadedness on standing.  She has dementia.  Some of the history is collaborated from her son Tanisha Mcnamara. In ER, BP was 108/60 on arrival.  Lab work is pertinent for BNP 7256, troponin <0.01.  CTA chest/abdomen/pelvis pending.  Emergent echocardiogram shows moderate circumferential pericardial effusion with no tamponade physiology    Patient underwent TAVR yesterday. She is being followed by oncology and surgery and plan is for endoscopic ultrasound on Friday. Subjective  Patient seen and examined at bedside. Denies any headache or dizziness. She is afebrile per chart reviewed, overnight events reviewed. Scheduled for endoscopic ultrasound tomorrow. Denies any chest pain, shortness of breath or palpitations. Exam:    /65   Pulse 68   Temp 98 °F (36.7 °C) (Oral)   Resp 16   Ht 5' 8\" (1.727 m)   Wt 140 lb (63.5 kg)   SpO2 96%   BMI 21.29 kg/m²     HEENT: No pallor, no icterus. Respiratory:  CTA, good air entry. Cardiovascular: RRR, no murmur. Abdomen: Soft, non-tender, BS noted. Musculoskeletal: No joint pains or joint swelling noted.    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]     Critical aortic valve stenosis [I35.0] 01/29/2021     · Presyncope  · Large malignant pericardial effusion s/p pericardiocentesis  · Intrahepatic and extrahepatic biliary duct dilatation    Plan:  · Status post TAVR, follow-up with cardiology, continue with current medications  · Patient scheduled for endoscopic ultrasound  · Follow cytology  · Continue rest of her medications      Labs:   Recent Labs     02/23/21  0418 02/24/21  0820 02/25/21  1153   WBC 10.4 7.2 9.6   HGB 10.8* 12.6 10.6*   HCT 35.6 40.4 35.9    220 155     Recent Labs     02/22/21  1405 02/23/21  0418 02/25/21  1153    139 137   K 4.4 4.3 3.9    104 102   CO2 23 25 28   BUN 26* 33* 24*   CREATININE 0.8 0.9 0.8   CALCIUM 9.2 8.9 8.6     Recent Labs     02/22/21  1405 02/23/21  0418   AST 11 17   ALT 8 9   BILITOT 0.4 0.2   ALKPHOS 67 57     Recent Labs     02/23/21  1443   INR 1.0     No results for input(s): Onetha Roverto in the last 72 hours.     Medications:  Reviewed    Infusion Medications    insulin      dextrose      niCARdipene (CARDENE) 50 mg in 250 mL 0.9 % sodium chloride infusion      [Held by provider] sodium chloride 75 mL/hr at 02/20/21 0542     Scheduled Medications    sodium chloride flush  10 mL Intravenous 2 times per day    aspirin  81 mg Oral Daily    insulin glargine  0.15 Units/kg Subcutaneous Nightly    ceFAZolin (ANCEF) IVPB  2,000 mg Intravenous Q8H    polyethylene glycol  17 g Oral Daily    docusate sodium  100 mg Oral BID    venlafaxine  75 mg Oral Daily    enoxaparin  40 mg Subcutaneous Daily     PRN Meds: sodium chloride flush, oxyCODONE-acetaminophen, glucose, dextrose, glucagon (rDNA), dextrose, perflutren lipid microspheres, acetaminophen, bisacodyl, promethazine **OR** ondansetron, acetaminophen **OR** acetaminophen, potassium chloride **OR** potassium alternative oral replacement **OR** potassium chloride, meclizine      Intake/Output Summary (Last 24 hours) at 2/25/2021 1325  Last data filed at 2/25/2021 0500  Gross per 24 hour   Intake 1490 ml   Output 2705 ml   Net -1215 ml     · Body mass index is 21.29 kg/m². · Diet  · DIET CARDIAC;    · Code Status  · Full Code      Electronically signed by Sherryle Fielding, MD on 2/25/2021 at 1:25 PM  [employer]   Please contact me through perfect serve    NOTE: This report was transcribed using voice recognition software. Every effort was made to ensure accuracy; however, inadvertent computerized transcription errors may be present.

## 2021-02-25 NOTE — PROGRESS NOTES
Occupational Therapy  OT BEDSIDE TREATMENT NOTE      Date:2021  Patient Name: Suzi Fontaine  MRN: [id number]  : 1935  Room: [address] [place] [address]O       Pt's chart reviewed and treatment attempted, pt declined at this time due to \"bladder pain\". Educated pt on importance of OOB activity and participation in therapy. Will attempt at a later time/date.         Zina Mahan

## 2021-02-25 NOTE — PROGRESS NOTES
Palliative Medicine  Progress Note    Palliative Care Department  [phone]  Palliative Care Initial Consult  Shira Messer HAIDER-CNS, [address] [facility]  [id number]  Hospital Day: 7    Date of Initial Consult: 2//23/21  Referring Provider: Nubia MCDONNELL    Palliative Medicine was consulted for assistance with:  Assist with goals of care, Symptom Management and Family Support      HPI:   Margot Edwards is a 82 y.o. female with significant past medical history of severe aortic stenosis and a recent large pericardial effusion s/p pericardiocentesis (1.4L)  with cytology suspicious for adenocarcinoma. She underwent further workup and is noted to have extrahepatic and central intrahepatic biliary ductal dilation and there is concern for a possible ampullary neoplasm. She was admitted on 2/19/2021 after follow up with cardiology and sent in for further management of her severe aortic stenosis, as she has been having presyncope. She is planned to have TAVR and possibly a pericardial window performed. Oncology and surgery are following closely and plan to further work up possible neoplasm after AS is addressed. ASSESSMENT/PLAN:     Pertinent Hospital Diagnoses   · Ampulla of Vater mass- plan for endoscopic US tomorrow 2/26/21  · Large malignant pericardial effusion s/p pericardiocentesis s/p drain  · Critical Aortic valve stenosis s/p TAVR 2/24; Cardiology following  · Depression/Anxiety-Psychiatry to see  · Constipation-        Palliative Care Encounter / Counseling Regarding Goals of [address], Does have capacity for medical decision-making.   Capacity is time limited and situation/question specific   Outcome of goals of care meeting:  continue current workup and results will determine plan of care; likely to return to [facility] on d/c   Code status Full Code-continue   Advanced Directives: no HPOA or Living Will noted in chart   Surrogate/Legal NOK: Quin Graves ([phone]) is the patient's son      Details of Conversation:    2/25/21 Pt seen. Appears weak with weak voice quality today. States she feels depressed and just wants to go back home. Psychiatry has been consulted for input. She states she thinks she took an antidepressant years ago; is aware that workup continues and is fearful of the results. Pt underwent TAVR yesterday- no complaints of chest pain. Plan for endoscopic ultrasound on Friday. MRI of brain pending. She does complain of abdominal pain; states doesn't remember last BM; was just given colace and glycolax. Appreciative for chance to discuss her feelings and states she is able to talk to her son about it as well. Will need further discussion about goals and code status once workup results known. Spoke with staff nurse. Will follow along. 2/23/2021:  Per Mejia Avila CNP Mrs. Michael Xiao was seen at there bedside today, no family present. She is alert and oriented, however she does not appear to be a very good historian. She does not demonstrate good knowledge of her current condition but does express some stress and concern regarding her needs. She admits to feeling overwhelmed by what is going on with her but is not able to express a good understanding of her needs or current plan of care. She is for pulmonary function test at this time and is agreeable having palliative care follow along to provide support for her through her hospitalization.     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]     Critical aortic valve stenosis [I35.0] 01/29/2021           OBJECTIVE:   Prognosis: unknown    Physical Exam:  /65   Pulse 68   Temp 98 °F (36.7 °C) (Oral)   Resp 16   Ht 5' 8\" (1.727 m)   Wt 140 lb (63.5 kg)   SpO2 96%   BMI 21.29 kg/m²   Gen:   NAD, awake, alert; appears depressed  HEENT:  Normocephalic, atraumatic, mucosa moist, EOMI  Neck:  Supple, trachea midline, no JVD  Lungs:  CTA bilaterally, no audible rhonchi or wheezes noted, respirations unlabored  Heart:  NSR per monitor; RRR,  Abd:  Soft, tender, non distended, bowel sounds present; no BM  : vazquez catheter in place  Ext:  Moving all extremities, no edema, pulses present  Skin:  Warm and dry  Neuro:  PERRL, Alert, oriented x 3; following commands    Social History:   The patient currently lives [address]:  reports that she quit smoking about 13 years ago. She quit after 45.00 years of use. She has never used smokeless tobacco.  ETOH:  reports no history of alcohol use. Objective data reviewed: labs, images, records, medication use, vitals and chart    Discussed patient and the plan of care with the other IDT members: Palliative Medicine IDT Team    Time/Communication  Greater than 50% of time spent, total 15 minutes in counseling and coordination of care at the bedside regarding goals of care, symptom management, diagnosis and prognosis and see above. Thank you for allowing Palliative Medicine to participate in the care of [employer].   Dina MALONEY-JOSÉ MIGUEL, MITZIPN

## 2021-02-26 ENCOUNTER — TELEPHONE (OUTPATIENT)
Dept: CARDIOLOGY | Age: 86
End: 2021-02-26

## 2021-02-26 ENCOUNTER — ANESTHESIA (OUTPATIENT)
Dept: ENDOSCOPY | Age: 86
DRG: 267 | End: 2021-02-26
Payer: MEDICARE

## 2021-02-26 VITALS — OXYGEN SATURATION: 99 % | SYSTOLIC BLOOD PRESSURE: 121 MMHG | DIASTOLIC BLOOD PRESSURE: 63 MMHG

## 2021-02-26 PROBLEM — E44.0 MODERATE PROTEIN-CALORIE MALNUTRITION (HCC): Chronic | Status: ACTIVE | Noted: 2021-02-26

## 2021-02-26 LAB
ANION GAP SERPL CALCULATED.3IONS-SCNC: 5 MMOL/L (ref 7–16)
BUN BLDV-MCNC: 18 MG/DL (ref 8–23)
CALCIUM SERPL-MCNC: 8.6 MG/DL (ref 8.6–10.2)
CHLORIDE BLD-SCNC: 104 MMOL/L (ref 98–107)
CO2: 30 MMOL/L (ref 22–29)
CREAT SERPL-MCNC: 0.8 MG/DL (ref 0.5–1)
EKG ATRIAL RATE: 58 BPM
EKG P AXIS: 0 DEGREES
EKG P-R INTERVAL: 144 MS
EKG Q-T INTERVAL: 468 MS
EKG QRS DURATION: 78 MS
EKG QTC CALCULATION (BAZETT): 459 MS
EKG R AXIS: -41 DEGREES
EKG T AXIS: 86 DEGREES
EKG VENTRICULAR RATE: 58 BPM
GFR AFRICAN AMERICAN: >60
GFR NON-AFRICAN AMERICAN: >60 ML/MIN/1.73
GLUCOSE BLD-MCNC: 88 MG/DL (ref 74–99)
HCT VFR BLD CALC: 35.4 % (ref 34–48)
HEMOGLOBIN: 11 G/DL (ref 11.5–15.5)
MCH RBC QN AUTO: 27 PG (ref 26–35)
MCHC RBC AUTO-ENTMCNC: 31.1 % (ref 32–34.5)
MCV RBC AUTO: 87 FL (ref 80–99.9)
METER GLUCOSE: 117 MG/DL (ref 74–99)
PDW BLD-RTO: 17.2 FL (ref 11.5–15)
PLATELET # BLD: 143 E9/L (ref 130–450)
PMV BLD AUTO: 11.9 FL (ref 7–12)
POTASSIUM SERPL-SCNC: 4 MMOL/L (ref 3.5–5)
RBC # BLD: 4.07 E12/L (ref 3.5–5.5)
SODIUM BLD-SCNC: 139 MMOL/L (ref 132–146)
WBC # BLD: 7.6 E9/L (ref 4.5–11.5)

## 2021-02-26 PROCEDURE — 2580000003 HC RX 258: Performed by: PHYSICIAN ASSISTANT

## 2021-02-26 PROCEDURE — 0FBC8ZX EXCISION OF AMPULLA OF VATER, VIA NATURAL OR ARTIFICIAL OPENING ENDOSCOPIC, DIAGNOSTIC: ICD-10-PCS | Performed by: SURGERY

## 2021-02-26 PROCEDURE — 3700000001 HC ADD 15 MINUTES (ANESTHESIA): Performed by: SURGERY

## 2021-02-26 PROCEDURE — 3700000000 HC ANESTHESIA ATTENDED CARE: Performed by: SURGERY

## 2021-02-26 PROCEDURE — 99233 SBSQ HOSP IP/OBS HIGH 50: CPT | Performed by: INTERNAL MEDICINE

## 2021-02-26 PROCEDURE — 7100000000 HC PACU RECOVERY - FIRST 15 MIN: Performed by: SURGERY

## 2021-02-26 PROCEDURE — 97164 PT RE-EVAL EST PLAN CARE: CPT

## 2021-02-26 PROCEDURE — 6370000000 HC RX 637 (ALT 250 FOR IP): Performed by: PHYSICIAN ASSISTANT

## 2021-02-26 PROCEDURE — 2580000003 HC RX 258: Performed by: INTERNAL MEDICINE

## 2021-02-26 PROCEDURE — 82962 GLUCOSE BLOOD TEST: CPT

## 2021-02-26 PROCEDURE — 3609020800 HC EGD W/EUS FNA: Performed by: SURGERY

## 2021-02-26 PROCEDURE — 6360000002 HC RX W HCPCS: Performed by: ANESTHESIOLOGY

## 2021-02-26 PROCEDURE — 6360000002 HC RX W HCPCS: Performed by: PHYSICIAN ASSISTANT

## 2021-02-26 PROCEDURE — 85027 COMPLETE CBC AUTOMATED: CPT

## 2021-02-26 PROCEDURE — 88305 TISSUE EXAM BY PATHOLOGIST: CPT

## 2021-02-26 PROCEDURE — 93308 TTE F-UP OR LMTD: CPT

## 2021-02-26 PROCEDURE — 6360000002 HC RX W HCPCS: Performed by: NURSE ANESTHETIST, CERTIFIED REGISTERED

## 2021-02-26 PROCEDURE — 97535 SELF CARE MNGMENT TRAINING: CPT

## 2021-02-26 PROCEDURE — 2580000003 HC RX 258: Performed by: NURSE ANESTHETIST, CERTIFIED REGISTERED

## 2021-02-26 PROCEDURE — 93005 ELECTROCARDIOGRAM TRACING: CPT | Performed by: PHYSICIAN ASSISTANT

## 2021-02-26 PROCEDURE — 80048 BASIC METABOLIC PNL TOTAL CA: CPT

## 2021-02-26 PROCEDURE — 88173 CYTOPATH EVAL FNA REPORT: CPT

## 2021-02-26 PROCEDURE — C1753 CATH, INTRAVAS ULTRASOUND: HCPCS | Performed by: SURGERY

## 2021-02-26 PROCEDURE — 7100000001 HC PACU RECOVERY - ADDTL 15 MIN: Performed by: SURGERY

## 2021-02-26 PROCEDURE — 6360000002 HC RX W HCPCS: Performed by: INTERNAL MEDICINE

## 2021-02-26 PROCEDURE — APPSS180 APP SPLIT SHARED TIME > 60 MINUTES: Performed by: NURSE PRACTITIONER

## 2021-02-26 PROCEDURE — 97530 THERAPEUTIC ACTIVITIES: CPT

## 2021-02-26 PROCEDURE — 2709999900 HC NON-CHARGEABLE SUPPLY: Performed by: SURGERY

## 2021-02-26 PROCEDURE — 93010 ELECTROCARDIOGRAM REPORT: CPT | Performed by: INTERNAL MEDICINE

## 2021-02-26 PROCEDURE — 99231 SBSQ HOSP IP/OBS SF/LOW 25: CPT | Performed by: CLINICAL NURSE SPECIALIST

## 2021-02-26 PROCEDURE — 2140000000 HC CCU INTERMEDIATE R&B

## 2021-02-26 PROCEDURE — 43242 EGD US FINE NEEDLE BX/ASPIR: CPT | Performed by: SURGERY

## 2021-02-26 PROCEDURE — 2720000010 HC SURG SUPPLY STERILE: Performed by: SURGERY

## 2021-02-26 PROCEDURE — 36415 COLL VENOUS BLD VENIPUNCTURE: CPT

## 2021-02-26 RX ORDER — MORPHINE SULFATE 2 MG/ML
1 INJECTION, SOLUTION INTRAMUSCULAR; INTRAVENOUS EVERY 5 MIN PRN
Status: DISCONTINUED | OUTPATIENT
Start: 2021-02-26 | End: 2021-02-26 | Stop reason: HOSPADM

## 2021-02-26 RX ORDER — PROPOFOL 10 MG/ML
INJECTION, EMULSION INTRAVENOUS PRN
Status: DISCONTINUED | OUTPATIENT
Start: 2021-02-26 | End: 2021-02-26 | Stop reason: SDUPTHER

## 2021-02-26 RX ORDER — HYDROCODONE BITARTRATE AND ACETAMINOPHEN 5; 325 MG/1; MG/1
1 TABLET ORAL PRN
Status: DISCONTINUED | OUTPATIENT
Start: 2021-02-26 | End: 2021-02-26 | Stop reason: HOSPADM

## 2021-02-26 RX ORDER — MORPHINE SULFATE 2 MG/ML
2 INJECTION, SOLUTION INTRAMUSCULAR; INTRAVENOUS EVERY 5 MIN PRN
Status: DISCONTINUED | OUTPATIENT
Start: 2021-02-26 | End: 2021-02-26 | Stop reason: HOSPADM

## 2021-02-26 RX ORDER — HYDROCODONE BITARTRATE AND ACETAMINOPHEN 5; 325 MG/1; MG/1
2 TABLET ORAL PRN
Status: DISCONTINUED | OUTPATIENT
Start: 2021-02-26 | End: 2021-02-26 | Stop reason: HOSPADM

## 2021-02-26 RX ORDER — SODIUM CHLORIDE 9 MG/ML
INJECTION, SOLUTION INTRAVENOUS CONTINUOUS PRN
Status: DISCONTINUED | OUTPATIENT
Start: 2021-02-26 | End: 2021-02-26 | Stop reason: SDUPTHER

## 2021-02-26 RX ORDER — PROMETHAZINE HYDROCHLORIDE 25 MG/ML
6.25 INJECTION, SOLUTION INTRAMUSCULAR; INTRAVENOUS EVERY 10 MIN PRN
Status: DISCONTINUED | OUTPATIENT
Start: 2021-02-26 | End: 2021-02-26 | Stop reason: HOSPADM

## 2021-02-26 RX ORDER — MEPERIDINE HYDROCHLORIDE 25 MG/ML
12.5 INJECTION INTRAMUSCULAR; INTRAVENOUS; SUBCUTANEOUS EVERY 5 MIN PRN
Status: DISCONTINUED | OUTPATIENT
Start: 2021-02-26 | End: 2021-02-26 | Stop reason: HOSPADM

## 2021-02-26 RX ADMIN — MORPHINE SULFATE 2 MG: 2 INJECTION, SOLUTION INTRAMUSCULAR; INTRAVENOUS at 08:06

## 2021-02-26 RX ADMIN — DOCUSATE SODIUM 100 MG: 100 CAPSULE, LIQUID FILLED ORAL at 21:53

## 2021-02-26 RX ADMIN — METOPROLOL SUCCINATE 50 MG: 50 TABLET, EXTENDED RELEASE ORAL at 10:28

## 2021-02-26 RX ADMIN — SODIUM CHLORIDE, PRESERVATIVE FREE 10 ML: 5 INJECTION INTRAVENOUS at 10:28

## 2021-02-26 RX ADMIN — CEFAZOLIN 1500 MG: 1 INJECTION, POWDER, FOR SOLUTION INTRAMUSCULAR; INTRAVENOUS at 18:57

## 2021-02-26 RX ADMIN — ENOXAPARIN SODIUM 40 MG: 40 INJECTION SUBCUTANEOUS at 17:53

## 2021-02-26 RX ADMIN — SODIUM CHLORIDE, PRESERVATIVE FREE 10 ML: 5 INJECTION INTRAVENOUS at 21:53

## 2021-02-26 RX ADMIN — DOCUSATE SODIUM 100 MG: 100 CAPSULE, LIQUID FILLED ORAL at 10:28

## 2021-02-26 RX ADMIN — VENLAFAXINE 75 MG: 75 TABLET ORAL at 10:28

## 2021-02-26 RX ADMIN — ASPIRIN 81 MG: 81 TABLET, COATED ORAL at 10:28

## 2021-02-26 RX ADMIN — PROPOFOL 200 MG: 10 INJECTION, EMULSION INTRAVENOUS at 07:10

## 2021-02-26 RX ADMIN — SODIUM CHLORIDE: 9 INJECTION, SOLUTION INTRAVENOUS at 06:58

## 2021-02-26 ASSESSMENT — PAIN SCALES - GENERAL
PAINLEVEL_OUTOF10: 0

## 2021-02-26 ASSESSMENT — PULMONARY FUNCTION TESTS
PIF_VALUE: 1
PIF_VALUE: 0
PIF_VALUE: 1

## 2021-02-26 ASSESSMENT — PAIN DESCRIPTION - DESCRIPTORS: DESCRIPTORS: CONSTANT;DULL

## 2021-02-26 NOTE — PROGRESS NOTES
Physical Therapy  Re-Assessment    Name: Margot Edwards  : 1935  MRN: [id number]    Referring Provider:  Dianelys Burks MD    Date of Service: 2021    Re-Evaluating PT: Pearce Ro PT, DPT    Room #:  5452/2219-H  Diagnosis:  Critical aortic valve stenosis  PMHx/PSHx:  Heart murmur, HTN, Arthritis, Anxiety, Knee pain, Dizziness, Migraine headache, Rhabdomyolysis, Polypharmacy, Malnutrition, Acute delirium, Hyperlipidemia, Pericardial effusion, Back surgery , R TKA , Pericardium surgery 2021  Procedure/Surgery:  Cardiac cath 2021   TAVR 2021   EGD/EUS with biopsy ampulla  Precautions:  Falls, Cognition, Chest tube  Equipment Needs:  Has [employer]    SUBJECTIVE:    Pt lives alone in a 2 story home with 2 stairs to enter and 1 rail(s). Bed is on 2nd floor and bath is on 2nd floor with flight and 1 rail(s). Pt ambulated with no AD PTA. Pt reported independent with ADLs. Pt is not actively driving. Pt reported son to set up first floor bed/bath soon. OBJECTIVE:   Re-Evaluation  Date: 2021 Treatment  NA Short Term/ Long Term   Goals   AM-PAC 6 Clicks 76/83     Was pt agreeable to Eval/treatment? Yes      Does pt have pain?  7/10 HA and abdominal discomfort     Bed Mobility  Rolling: SBA  Supine to sit: Min A   Sit to supine: Min A   Scooting: Min A  Supervision   Transfers Sit to stand: Min A   Stand to sit: Min A   Stand pivot: Min<>Mod A using [employer]  Supervision   Ambulation   25 feet x2 reps using [employer] with Min<>Mod A   >150 feet with [employer] with Supervision   Stair negotiation: ascended and descended  NT  12 steps with 1 rail with Supervision   ROM BUE:  WFL  BLE:  WFL     Strength BUE:  4-/5  BLE:  4-/5  Increase 1/3 grade MMT   Balance Sitting EOB: SBA  Dynamic Standing: Min<>Mod A using WW to correct LOB  Sitting EOB:  Independent  Dynamic Standing:  Supervision using [employer]     Pt is A & O x 2; Pt stated \"I thought it was a hospital\" Unable to state month despite cues, but knew year was 2021; Not oriented to situation  Sensation:  Pt denied numbness and tingling  Edema:  None noted    Vitals: NA    Therapeutic Exercises:  NT    Patient education  Pt educated on purpose of safety with mobility, transfers, gait     Patient response to education:   Pt verbalized understanding Pt demonstrated skill Pt requires further education in this area   Yes  Yes with verbal cues and assist Yes      ASSESSMENT:    Comments:  Patient cleared by RN and agreeable to session. Pt was received supine and required encouragement to participate. Pt with poor safety awareness and demonstrated a decline in cognition and function since initial evaluation. Pt required frequent cues for safety. Assist for trunk lift with sitting up. Pt required lift assist and steadying assist upon standing. Pt with frequent LOB and posterior lean. Poor [employer] management. Pt required constant assist.  Pt amb with narrow YSABEL and scissoring at times. During amb, pt reported needing to have BM. Assist for lower/lift and VCs for safety with commode transfer. Pt returned to supine and left with bed in semi-chair position. Pt left with call button within reach and all needs met. Treatment:  Patient practiced and was instructed in the following treatment:     Bed mobility training - pt given verbal and tactile cues to facilitate proper sequencing and safety during rolling and supine>sit as well as provided with physical assistance for trunk to complete task    Transfer training with VCs for hand placement, sequencing and technique. Physical assist to complete task and correct LOB and unsteadiness upon standing.  Standing balance retraining with VCs for upright posture and forward gaze. Physical assist to correct unsteadiness.   Standing weight shifts to R/L with physical assist.      · Gait training- pt was given verbal and tactile cues to facilitate increased step height, widen YSABEL and body positioning inside walker during ambulation as well as provided with physical assistance to complete task. · Therapeutic Exercises/Activities as noted above.  Patient education provided with focus on upright tolerance, safety, correcting posterior LOB and benefits of participating with therapy. Pt's/ family goals   1. None stated    Patient and or family understand(s) diagnosis, prognosis, and plan of care. Yes     PLAN OF CARE:    Current Treatment Recommendations     [x] Strengthening     [] ROM   [x] Balance Training   [x] Endurance Training   [x] Transfer Training   [x] Gait Training   [x] Stair Training   [] Positioning   [x] Safety and Education Training   [x] Patient/Caregiver Education   [] HEP  [] Other       PT care will be provided in accordance with the objectives noted above and progressed and updated when appropriate. Exercises and functional mobility practice will be used as well as appropriate assistive devices or modalities to obtain goals. Patient and family education will also be administered as needed. Frequency of treatments: 2-5x/week x 1-2 weeks. Time in  1410  Time out  1440    Total Treatment Time  23 minutes     Evaluation Time includes thorough review of current medical information, gathering information on past medical history/social history and prior level of function, completion of standardized testing/informal observation of tasks, assessment of data and education on plan of care and goals.     CPT codes:  [] Low Complexity PT evaluation 31693  [] Moderate Complexity PT evaluation 63297  [] High Complexity PT evaluation 65007  [x] PT Re-evaluation 99717  [] Gait training 59264 - minutes  [] Manual therapy 20899 - minutes  [x] Therapeutic activities 06900 23 minutes  [] Therapeutic exercises 92560 - minutes  [] Neuromuscular reeducation 42904 - minutes     Rolf Thompson, PT, DPT  License [id number]

## 2021-02-26 NOTE — PROGRESS NOTES
ENDOSCOPIC SURGERY  DAILY PROGRESS NOTE  2/26/2021    Subjective: For EUS today   No acute events overnight   Seems to be less depressed today   CEA (7), CA 19-9 (6), AFP (3)    Objective:  BP (!) 144/67   Pulse 68   Temp 98.7 °F (37.1 °C) (Temporal)   Resp 16   Ht 5' 8\" (1.727 m)   Wt 140 lb (63.5 kg)   SpO2 97%   BMI 21.29 kg/m²     GENERAL:  Laying in bed, awake, alert, cooperative, no apparent distress  HEAD: Normocephalic  EYES: No sclera icterus, pupils equal  LUNGS:  No increased work of breathing  CARDIOVASCULAR:  RR  ABDOMEN:  Soft, non-tender, non-distended  EXTREMITIES: No edema or swelling  SKIN: Warm and dry    Assessment/Plan:  82 y.o. female  with intrahepatic and extrahepatic duct dilation, pancreatic duct dilation.  Cytology from pericardial effusion concerning for possible adenocarcinoma.     Overall care per primary   Npo  EUS today   Chromogrannin A still pending, CEA (7), CA 19-9 (6), AFP (3)      Electronically signed by Mireya Poole DO on 2/26/2021 at 6:40 AM

## 2021-02-26 NOTE — PROGRESS NOTES
OT BEDSIDE TREATMENT NOTE      Date:2021  Patient Name: Shin Johnson  MRN: [id number]  : 1935  Room: [address] [place] [address]     Per OT Eval:  Evaluating OT: Cole Mcallister OTR/L #[id number]     AM-PAC Daily Activity Raw Score: 1324     Recommended Adaptive Equipment/DME: To be further assessed       Diagnosis:    1. Critical aortic valve stenosis    2. Pericardial effusion          Pertinent Medical History:   Past Medical History        Past Medical History:   Diagnosis Date    Accidental drug overdose 2014    Acute delirium 2014    Anxiety      Arthritis      Dizziness, nonspecific      Generalized headaches      Heart murmur       stable per pt    Hyperlipidemia      Hypertension      Knee pain       right    Malnutrition ([address] 2014    Migraine headache      Pericardial effusion 2021    Polypharmacy 2014    Reflux gastritis      Rhabdomyolysis 2014    Sinus problem           Precautions:  Falls, bed alarm, cognition, blurred vision     Home Living:  Prior 2021 pt lived alone in a 2 story home. Pt is currently [facility]  Prior Level of Function:  Poor historian d/t confusion. Per pt she was indep  with ADLs , and   with IADLs; using ww for ambulation.      Pain Level: Pt did not complain of pain this session  Cognition: A&O: self, hospital. Confused to month and year, \"I don't know\"   Follows 2 step directions: fair               Memory:  poor              Sequencing:  poor              Problem solving:  poor              Judgement/safety:  poor                Functional Assessment:    Initial Eval Status  Date: 21 Treatment Status  Date: 21 STG=LTG  Time frame: 5-7 days   Feeding Moderate Assist   Required set up and frequent cues for location of food and postioning of food in front of her. Poor problem solving  DNT  modA per previous level Supervision    Grooming Moderate Assist   To wash hands at sink.  Poor problem solving Isabel Harper  SBA  Pt washed face, applied deodorant, combed hair seated EOB Stand by Assist    UB Dressing Minimal Assist   [facility] gown Clayton Aguilar/[facility] gow with assistance to manage of lines/tubes seated EOB Supervision    LB Dressing Moderate Assist   To doff/greer brief    SBA  Pt donned/doffed hospital socks seated EOB, using of cross over technique, and bringing of knee to chest    DNT pants this date  Supervision    Bathing Moderate Assist  Seated EOB  Poor safety and sequencing  Jordan  Pt completed sponge bathing task seated/standing EOB, with pt able to wash of UB, use of cross over technique to wash of LB, with assistance to stand and wash of buttocks/jyoti area for safety Stand by Assist    Toileting Minimal/mod Assist   To standard commode        Pt having of vazquez catheter Stand by Assist    Bed Mobility  Supine to sit: NT   Sit to supine: NT   SBA  Supine<>EOB  EOB<>Supine Supine to sit: Supervision   Sit to supine: Supervision    Functional Transfers Sit to stand: Minimal Assist   Stand to sit: Minimal Assist   Stand pivot: Minimal Assist  Jordan  Sit to Stand  Stand to Sit    Cueing for hand placement  Supervision    Functional Mobility Min A with ww  Cues for safety and walker management  Jordan  Pt ambulated short household distance in room with w.w. Supervision with ww   Balance Sitting:     Static:  supervision    Dynamic:SBA  Standing: min A  Sitting EOB:  SBA    Standing:  CGA/Jordan Sitting:     Static:  indep    Dynamic:indep  Standing: supervision with ww   Activity Tolerance Poor+  Fair- Good-   Visual/  Perceptual Glasses: yes . Pt appeared confused about the type of glasses she was wearing. Reading vs prescription.    Pt c/o blurred vision and could not identify colors or food items on tray.       Pt will be able to demonstrate ability to compensate for impaired vision using 75%      BUE  ROM/Strength/  Fine motor Coordination Hand dominance: R     RUE: ROM WFL     Strength: grossly 3+/5      Strength:  WFL     Coordination:  fair     LUE: ROM  WFL     Strength: grossly 3+/5      Strength:  WFL     Coordination: fair          Hearing: WFL   Sensation:   No c/o numbness or tingling   Tone:  WFL   Edema: none noted            Education:  Pt was educated through out treatment regarding proper technique & safety with bed mobility, functional transfers & mobility, techniques to assist with LB dressing tasks to improve safety & prevent falls and allow pt to return home safely. Comments: Upon arrival pt supine in bed, agreeable to therapy, speaking with nursing okaying pt to be seen this session. Pt completed of bed mobility, functional mobility, transfers and ADL tasks. At end of session, pt supine in bed, all lines and tubes intact, call light within reach. · Pt has made fair progress towards set goals.    · Continue with current plan of care focusing on increasing of independency with transfers and ADL tasks      Treatment Time In: 1:00pm            Treatment Time Out: 1:25pm             Treatment Charges: Mins Units   Ther Ex  61531     Manual Therapy 81872     Thera Activities 95829 8 1   ADL/Home Mgt 39261 17 1   Neuro Re-ed 00369     Group Therapy      Orthotic manage/training  24627     Non-Billable Time     Total Timed Treatment 25 2        Mai Dove LUNA/L 31906

## 2021-02-26 NOTE — PATIENT CARE CONFERENCE
[facility] Quality Flow/Interdisciplinary Rounds Progress Note        Quality Flow Rounds held on February 26, 2021    Disciplines Attending:  Bedside Nurse, ,  and Nursing Unit Leadership    Arcadio Solorzano was admitted on 2/19/2021  3:47 PM    Anticipated Discharge Date:  Expected Discharge Date: 02/23/21    Disposition:    Gerson Score:  Gerson Scale Score: 20    Readmission Risk              Risk of Unplanned Readmission:        17           Discussed patient goal for the day, patient clinical progression, and barriers to discharge.   The following Goal(s) of the Day/Commitment(s) have been identified:  diagnostics report results      Kelsey Menendez  February 26, 2021

## 2021-02-26 NOTE — ANESTHESIA PRE PROCEDURE
Department of Anesthesiology  Preprocedure Note       Name:  Kashif Nassar   Age:  82 y.o.  :  1935                                          MRN:  [id number]         Date:  2021      Surgeon: Tosin Hernandez):  Kristine Razo MD    Procedure: Procedure(s):  ENDOSCOPIC EGD ULTRASOUND    Medications prior to admission:   Prior to Admission medications    Medication Sig Start Date End Date Taking? Authorizing Provider   diphenhydrAMINE HCl (BENADRYL ALLERGY PO) Take by mouth   Yes Historical Provider, MD   predniSONE (DELTASONE) 50 MG tablet Take 50 mg by mouth daily   Yes Historical Provider, MD   acetaminophen (TYLENOL) 500 MG tablet Take 650 mg by mouth every 6 hours as needed for Pain   Yes Historical Provider, MD   bisacodyl (DULCOLAX) 10 MG suppository Place 1 suppository rectally daily as needed for Constipation 21 Yes Travis Moreno DO   sennosides-docusate sodium (SENOKOT-S) 8.6-50 MG tablet Take 1 tablet by mouth 2 times daily 21  Yes Travis Moreno,    Cholecalciferol (VITAMIN D3) 1.25 MG (55253 UT) CAPS Take by mouth once a week   Yes Historical Provider, MD   venlafaxine (EFFEXOR) 37.5 MG tablet Take 75 mg by mouth daily    Yes Historical Provider, MD   aspirin 325 MG EC tablet Take 1 tablet by mouth daily.  14   Nisha Pickard MD       Current medications:    Current Facility-Administered Medications   Medication Dose Route Frequency Provider Last Rate Last Admin    perflutren lipid microspheres (DEFINITY) injection 1.65 mg  1.5 mL Intravenous ONCE PRN GEOVANNY Ramachandran        metoprolol succinate (TOPROL XL) extended release tablet 50 mg  50 mg Oral Daily GEOVANNY Ramachandran   50 mg at 21 1845    sodium chloride flush 0.9 % injection 10 mL  10 mL Intravenous 2 times per day GEOVANNY Ramachandran   10 mL at 21 0934    sodium chloride flush 0.9 % injection 10 mL  10 mL Intravenous PRN GEOVANNY Ramachandran   10 mL at 21 1141    aspirin EC tablet 81 mg  81 mg Oral Daily Larraine Hilario, PA   81 mg at 02/25/21 0934    oxyCODONE-acetaminophen (PERCOCET) 5-325 MG per tablet 1 tablet  1 tablet Oral Q4H PRN Larraine Hilario, PA   1 tablet at 02/25/21 0332    insulin regular (HUMULIN R;NOVOLIN R) 100 Units in sodium chloride 0.9 % 100 mL infusion  1 Units/hr Intravenous Continuous Larraine Hilario, PA        insulin glargine (LANTUS) injection vial 10 Units  0.15 Units/kg Subcutaneous Nightly Larraine Hilario, PA        glucose (GLUTOSE) 40 % oral gel 15 g  15 g Oral PRN Larraine Hilario, PA        dextrose 50 % IV solution  12.5 g Intravenous PRN Larraine Hilario, PA        glucagon (rDNA) injection 1 mg  1 mg Intramuscular PRN Larraine Hilario, PA        dextrose 5 % solution  100 mL/hr Intravenous PRN Larraine Hilario, PA        ceFAZolin (ANCEF) 2000 mg in sterile water 20 mL IV syringe  2,000 mg Intravenous Q8H Larraine Hilario, PA   2,000 mg at 02/25/21 1846    perflutren lipid microspheres (DEFINITY) injection 1.65 mg  1.5 mL Intravenous ONCE PRN Larraine Hilario, PA        acetaminophen (TYLENOL) tablet 650 mg  650 mg Oral Q4H PRN Larraine Hilario, PA        polyethylene glycol (GLYCOLAX) packet 17 g  17 g Oral Daily Larraine Hilario, PA   17 g at 02/25/21 8951    docusate sodium (COLACE) capsule 100 mg  100 mg Oral BID Larraine Hilario, PA   100 mg at 02/25/21 0934    bisacodyl (DULCOLAX) EC tablet 5 mg  5 mg Oral Daily PRN Larraine Hilario, PA        venlafaxine [facility]) tablet 75 mg  75 mg Oral Daily Larraine Hilario, PA   75 mg at 02/25/21 0934    enoxaparin (LOVENOX) injection 40 mg  40 mg Subcutaneous Daily Larraine Hilario, PA   40 mg at 02/25/21 0935    promethazine (PHENERGAN) tablet 12.5 mg  12.5 mg Oral Q6H PRN Larraine Hilario, PA        Or    ondansetron (ZOFRAN) injection 4 mg  4 mg Intravenous Q6H PRN Larraine Hilario, PA        acetaminophen (TYLENOL) tablet 650 mg  650 mg Oral Q6H PRN Larraine GEOVANNY Rich   650 mg at REPLACEMENT FEMORAL APPROACH ANDPERICARDIALCENTESIS performed by Camron Jean MD at [address] [facility]      lumbar    CHOLECYSTECTOMY      HYSTERECTOMY      PERICARDIUM SURGERY N/A 2021    PERICARDIOCENTESIS performed by Lorraine Workman MD at [facility]  2012    right    TRANSESOPHAGEAL ECHOCARDIOGRAM N/A 2021    TRANSESOPHAGEAL ECHOCARDIOGRAM performed by Camron Jean MD at [address] History:    Social History     Tobacco Use    Smoking status: Former Smoker     Years: 45.00     Quit date: 2007     Years since quittin.4    Smokeless tobacco: Never Used   Substance Use Topics    Alcohol use: No                                Counseling given: Not Answered      Vital Signs (Current):   Vitals:    21 0500 21 0715 21 1051 21 1507   BP: (!) 116/55 127/61 137/65 139/65   Pulse:  57 68 71   Resp:  16 16 16   Temp:  36.4 °C (97.6 °F) 36.7 °C (98 °F) 36.8 °C (98.3 °F)   TempSrc:  Oral Oral Tympanic   SpO2:  96% 96%    Weight:       Height:                                                  BP Readings from Last 3 Encounters:   21 139/65   21 119/75   21 108/60       NPO Status:                                                                                 BMI:   Wt Readings from Last 3 Encounters:   21 140 lb (63.5 kg)   21 140 lb (63.5 kg)   21 140 lb (63.5 kg)     Body mass index is 21.29 kg/m².     CBC:   Lab Results   Component Value Date    WBC 9.6 2021    RBC 4.01 2021    HGB 10.6 2021    HCT 35.9 2021    MCV 89.5 2021    RDW 17.1 2021     2021       CMP:   Lab Results   Component Value Date     2021    K 3.9 2021    K 4.3 2021     2021    CO2 28 2021    BUN 24 2021    CREATININE 0.8 2021    GFRAA >60 2021    LABGLOM >60 2021    GLUCOSE 74 2021 GLUCOSE 90 05/16/2012    PROT 6.3 02/23/2021    CALCIUM 8.6 02/25/2021    BILITOT 0.2 02/23/2021    ALKPHOS 57 02/23/2021    AST 17 02/23/2021    ALT 9 02/23/2021       POC Tests: No results for input(s): POCGLU, POCNA, POCK, POCCL, POCBUN, POCHEMO, POCHCT in the last 72 hours. Coags:   Lab Results   Component Value Date    PROTIME 11.2 02/23/2021    PROTIME 11.5 06/24/2011    INR 1.0 02/23/2021    APTT 25.9 02/23/2021       HCG (If Applicable): No results found for: PREGTESTUR, PREGSERUM, HCG, HCGQUANT     ABGs: No results found for: PHART, PO2ART, QMB6TRK, RAJ7DYC, BEART, Y6NSAAEJ     Type & Screen (If Applicable):  No results found for: LABABO, LABRH    Drug/Infectious Status (If Applicable):  No results found for: HIV, HEPCAB    COVID-19 Screening (If Applicable):   Lab Results   Component Value Date    COVID19 Not Detected 02/18/2021       EXAMINATION:   ONE XRAY VIEW OF THE CHEST   2/23/2021 2:52 pm   COMPARISON:   02/19/2021   HISTORY:   ORDERING SYSTEM PROVIDED HISTORY: Pre-op   TECHNOLOGIST PROVIDED HISTORY:   Please obtain portable CXR ONLY if patient unable to get PA and lateral   Reason for exam:->Pre-op   What reading provider will be dictating this exam?->CRC   FINDINGS:   Decreased left pleural effusion with a persistent small effusion with   associated adjacent left basilar opacities.  No pneumothorax.  Unchanged   cardiomegaly.  No acute bony findings.       Impression   Persistent but improved left pleural effusion with left basilar opacities. Unchanged cardiomegaly. EXAMINATION:   CTA OF THE CHEST; CTA OF THE ABDOMEN AND PELVIS WITH CONTRAST 2/19/2021 3:05   pm   TECHNIQUE:   Dose modulation, iterative reconstruction, and/or weight based adjustment of   the mA/kV was utilized to reduce the radiation dose to as low as reasonably   achievable.; CTA of the chest was performed after the administration of   intravenous contrast.  Multiplanar reformatted images are provided for   review.   MIP images are provided for review. Dose modulation, iterative   reconstruction, and/or weight based adjustment of the mA/kV was utilized to   reduce the radiation dose to as low as reasonably achievable.; CTA of the   abdomen and pelvis was performed with the administration of intravenous   contrast. Multiplanar reformatted images are provided for review.  MIP images   are provided for review. Dose modulation, iterative reconstruction, and/or   weight based adjustment of the mA/kV was utilized to reduce the radiation   dose to as low as reasonably achievable. COMPARISON:   01/25/2021   HISTORY:   ORDERING SYSTEM PROVIDED HISTORY: Nodular calcific aortic valve stenosis   TECHNOLOGIST PROVIDED HISTORY:   Please include aortic valve calcium score   What reading provider will be dictating this exam?->CRC; ORDERING SYSTEM   PROVIDED HISTORY: Nodular calcific aortic valve stenosis   TECHNOLOGIST PROVIDED HISTORY:   TAVR   Please include aortic valve calcium score   Reason for exam:->severe aortic stenosis   What reading provider will be dictating this exam?->CRC   FINDINGS:   Chest:   Mediastinum: Moderate coronary artery calcification. No mediastinal, hilar,   or axillary lymphadenopathy. Small pericardial effusion.  Severe aortic   valvular calcification.  Ascending thoracic aorta measures 3.7 cm at the   level of the mid ascending thoracic aorta. Lungs/pleura: Unchanged 1.1 cm ground-glass opacity of the right lung apex on   series 8, image 39.  Moderate left pleural effusion.  Mild emphysema. Soft Tissues/Bones: Diffuse osteopenia with multilevel degenerative disc   disease of the thoracolumbar spine.    Abdomen/Pelvis:   Organs: Liver is normal in contour and enhancement.  Gallbladder is   surgically absent.  There is moderate extrahepatic and central intrahepatic   biliary ductal dilatation as well as moderate dilatation of the main   pancreatic duct.  No obstructing mass or stone is seen. Tony Govea are unremarkable.  Spleen is normal in size.  No renal calculi or hydronephrosis. Moderate calcific atherosclerosis.  No significant iliac stenosis. GI/Bowel: Large volume stool throughout the colon. Bowel is non-dilated   without wall thickening.  Appendix is not seen though there are no secondary   signs of appendicitis. Pelvis: Unremarkable. Peritoneum/Retroperitoneum:No free fluid, free air, organized fluid   collection or lymphadenopathy. Bones: Diffuse osteopenia with multilevel degenerative disc disease.       Impression   1. Severe aortic valvular calcification.  Ascending thoracic aorta measures   3.6 cm.  No significant iliac vessel stenosis. 2. Moderate extrahepatic and central intrahepatic biliary ductal dilatation   as well as moderate dilatation of the main pancreatic duct.  No stone or mass   is seen by CT.  Consider further evaluation with nonemergent pancreas   protocol MRI. 3. Moderate left pleural effusion. 4. Unchanged 1.1 cm ground-glass opacity of the right lung apex.  Recommend   follow-up chest CT in 6-12 months per Fleischner criteria. 5. Large volume stool throughout the colon. EXAMINATION:   MRI OF THE ABDOMEN WITH AND WITHOUT CONTRAST AND MRCP 2/21/2021 7:53 am   TECHNIQUE:   Multiplanar multisequence MRI of the abdomen was performed with and without   the administration of intravenous contrast.  After initial T2 axial and   coronal images, thick slab, thin slab and 3D coronal MRCP sequences were   obtained without the administration of intravenous contrast.  MIP images are   provided for review. COMPARISON:   02/19/2021   HISTORY:   ORDERING SYSTEM PROVIDED HISTORY: Dilated intrahepatic and extrahepatic ducts   TECHNOLOGIST PROVIDED HISTORY:   Reason for exam:->Dilated intrahepatic and extrahepatic ducts   What reading provider will be dictating this exam?->CRC   FINDINGS:   Lower Chest: Moderate left pleural effusion.  Small pericardial effusion.    Organs: Liver is normal in contour and enhancement.  Gallbladder is   surgically absent.  Moderate to severe extrahepatic and mild diffuse   intrahepatic biliary ductal dilatation is again demonstrated.  No obstructing   stone is seen.  Few cystic pancreatic lesions measuring up to 9 mm.  Adrenals   are unremarkable.  Spleen is normal in size.  5.6 cm complicated cystic   lesion of the lower pole of the right kidney with apparent enhancing   septations.  Moderate calcific atherosclerosis. GI/Bowel: Moderate hiatal hernia. Bowel is non-dilated without wall thickening. Peritoneum/Retroperitoneum:No free fluid, free air, organized fluid   collection or lymphadenopathy. Bones: Multilevel degenerative disc disease.       Impression   1. Moderate to severe extrahepatic and mild diffuse intrahepatic biliary   ductal dilatation is again demonstrated without obstructing stone seen.  This   is accompanied by mild pancreatic ductal dilatation. Mara Spine is apparent   fullness of the ampulla which does raise the possibility of a small ampullary   mass. 2. Moderate left pleural effusion. 3. Small pericardial effusion. 4. Complicated cystic lesion of the lower pole of the right kidney measuring   1.5 cm, Bosniak 3 equivalent.  If not intervened upon, recommend follow-up   renal mass protocol MRI in 6 months. 5. 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    Recommend follow-up pancreas protocol MRI in 2 years.                           Transthoracic Echocardiography Report (TTE)      Demographics      Patient Name       Shahbaz Richardson  Gender               Female                      A      Medical Record     [id number]        Room Number          6322   Number      Account #          [de-identified]       Procedure Date       02/25/2021      Corporate ID                       Ordering Physician   Veronica Hamlin MD      Accession Number   [id number]      Referring Physician      Date of Birth      1935 Sonographer          Evasilvia Piper RUST      Age                82 year(s)      Interpreting         Maddison Koehler MD                                      Physician                                         Any Other     Procedure     Type of Study      TTE procedure:Echo Limited Study. Procedure Date  Date: 02/25/2021 Start: 07:41 AM     Study Location: Portable  Technical Quality: Adequate visualization     Indications:S/P TAVR.     Patient Status: Routine     Height: 69 inches Weight: 147 pounds BSA: 1.81 m^2 BMI: 21.71 kg/m^2     BP: 127/61 mmHg     Allergies    - Iodine.      Findings      Left Ventricle   Normal left ventricle size. Estimated left ventricle ejection fraction >75% %. Moderate left ventricular concentric hypertrophy noted. An intracavitary peak gradient of 14mmHg is noted in the LV. Right Ventricle   Normal right ventricular size and function. Left Atrium   Left atrium is enlarged. Right Atrium   Normal right atrium size. Mitral Valve   Physiologic and/or trace mitral regurgitation is present. Tricuspid Valve   RVSP is 35 mmHg. Mild tricuspid regurgitation. Aortic Valve   There is a well-seated transcatheter valve in the aortic position. No   central or paravalvular regurgitation. MG 26, DVI 0.56, EOAi 0.9. There is   a hyperdynamic LV systolic function with an intracavitary gradient of   14mmHg. Pulmonic Valve   The pulmonic valve was not well visualized. Pericardial Effusion   Small pericardial effusion without signs of tamponade. Pleural Effusion   Large left pleural effusion. Aorta   Aorta was not clearly visualized. Miscellaneous   Normal Inferior Vena Cava diameter and respiratory variation. Conclusions      Summary   **POD1 s/p TAVR with 23-mm CHAN 3 Ultra**   Normal left ventricle size. Estimated left ventricle ejection fraction   >75%. Moderate left ventricular concentric hypertrophy noted.    Normal right ventricular size and function. There is a well-seated transcatheter valve in the aortic position. No   central or paravalvular regurgitation. MG 26, DVI 0.56, EOAi 0.9. There is   a hyperdynamic LV systolic function with an intracavitary gradient of   14mmHg. EKG Narrative & Impression    Sinus bradycardia  Left axis deviation  Voltage criteria for left ventricular hypertrophy  T wave abnormality, consider anterior ischemia  Prolonged QT  Abnormal ECG  When compared with ECG of 25-FEB-2021 06:36,  Significant changes have occurred  Confirmed by Rachel Kyle ([id number]) on 2/25/2021 6:29:10 PM                             Anesthesia Evaluation  Patient summary reviewed and Nursing notes reviewed no history of anesthetic complications:   Airway: Mallampati: I  TM distance: >3 FB   Neck ROM: full  Mouth opening: > = 3 FB Dental:    (+) upper dentures      Pulmonary:Negative Pulmonary ROS breath sounds clear to auscultation            Patient did not smoke on day of surgery. ROS comment: Ex smoker     Cardiovascular:    (+) hypertension:, valvular problems/murmurs:, murmur, hyperlipidemia      ECG reviewed  Rhythm: regular  Rate: normal  Echocardiogram reviewed               ROS comment: -pericardial effusion   - heart murmur      Neuro/Psych:   (+) neuromuscular disease:, headaches: migraine headaches, psychiatric history:depression/anxiety              ROS comment: -Acute delirium in the past    - depression, no medications GI/Hepatic/Renal: Neg GI/Hepatic/Renal ROS       (-) GERD       Endo/Other:    (+) : arthritis:., .                  ROS comment: - Patient takes nightly LANTUS - Patient states no HX of diabetes, chart does not list it. Abdominal:           Vascular: negative vascular ROS. Anesthesia Plan      MAC     ASA 2     (#20 R wrist  )  Induction: intravenous. Use of blood products discussed with patient whom.    Plan discussed with attending. Paty Dalton, RN   2/25/2021      Pt seen, examined, chart reviewed, plan discussed.   Martha Lloyd  2/26/2021  6:54 AM

## 2021-02-26 NOTE — OP NOTE
Endoscopic Ultrasound Procedure Note    Date of Procedure: 2/26/2021    Pre-procedure Diagnosis: Possible ampullary mass    Post-procedure Diagnosis: Same    Physician: Reina Barrera MD    Assistant: None    Estimated Blood Loss: Estimated amount of blood loss is 2ml. Anesthesia: LMAC     Complications: None    Indications and History:  The patient is a 82 y.o. female. The risks, benefits, complications, treatment options and expected outcomes were discussed with the patient. The possibilities of reaction to medication, pulmonary aspiration, perforation of the gastrointestinal tract, bleeding requiring transfusion or operation, respiratory failure requiring placement on a ventilator and failure to diagnose a condition were discussed with the patient who freely signed the consent. Description of Procedure: The patient was taken to the endoscopy suite, identified as Hayden Taylor and the procedure verified as Endoscopic Ultrasound (EUS). A Time Out was held and the above information confirmed. The patient was positioned in the left lateral position with an oral bite block and anesthesia was provided for sedation and comfort. The echoendoscope was passed to the second portion of the duodenum. EGD/EUS findings:   Esophagus: normal   Stomach: normal   Duodenum: Mild fullness of the ampulla without convincing evidence for an ampullary mass. Evaluation under endoscopic ultrasound did not reveal any abnormality in the duodenal wall at the level of the ampulla. There is normal tapering of the common bile duct and main pancreatic duct to the ampulla. The region of the ampulla was biopsied using a 22-gauge FNA needle making 2 passes. Endoscopic forcep biopsies were also used to grasp 2 samples from the ampulla. Pancreas: 3 mm pancreatic duct in the pancreatic head which tapers down to 2 mm in the body and tail.   Multiple subcentimeter cyst along the pancreatic duct consistent with IPMN's. Bile Duct: Dilated to 12 mm   Gallbladder: Surgically absent      Specimens:  1. FNA ampulla  2. Endoscopic biopsy ampulla    The Patient was taken to the Endoscopy Recovery area in satisfactory condition.       Electronically signed by Nelda Wilkerson MD on 2/26/2021 at 7:39 AM

## 2021-02-26 NOTE — PROGRESS NOTES
INPATIENT MEDICAL ONCOLOGY PROGRESS NOTE    SUBJECTIVE:    Afebrile  Reports depression and anxiety  Decreased dizziness today . OBJECTIVE  VITALS:  BP (!) 93/50   Pulse 56   Temp 98.1 °F (36.7 °C) (Oral)   Resp 16   Ht 5' 8\" (1.727 m)   Wt 140 lb (63.5 kg)   SpO2 95%   BMI 21.29 kg/m²   ENT:  oropharynx clear  NECK:  No  lymphadenopathy. HEMATOLOGIC/LYMPHATICS:  No abnormal lymphadenopathy. LUNGS:  Clear to auscultation, room air , CT draining pink/clear fluid  CARDIOVASCULAR:  Regular rate and rhythm. No clicks, murmurs, rubs or gallops. ABDOMEN:  Soft, nontender. No ascites. No mass or organomegaly. NEUROLOGIC:  No focal deficits. Anxious   EXTREMITIES: without clubbing, cyanosis, or edema. DIAGNOSTIC DATA  Labs:    Lab Results   Component Value Date    WBC 7.6 02/26/2021    HGB 11.0 (L) 02/26/2021    HCT 35.4 02/26/2021    MCV 87.0 02/26/2021     02/26/2021     Lab Results   Component Value Date     02/26/2021    K 4.0 02/26/2021     02/26/2021    CO2 30 (H) 02/26/2021    BUN 18 02/26/2021    CREATININE 0.8 02/26/2021    GLUCOSE 88 02/26/2021    CALCIUM 8.6 02/26/2021    PROT 6.3 (L) 02/23/2021    LABALBU 3.6 02/23/2021    BILITOT 0.2 02/23/2021    ALKPHOS 57 02/23/2021    AST 17 02/23/2021    ALT 9 02/23/2021    LABGLOM >60 02/26/2021    GFRAA >60 02/26/2021     Lab Results   Component Value Date    CEA 7.0 (H) 02/21/2021     IMPRESSION:  Patient Active Problem List   Diagnosis    Anxiety    Migraine headache    Accidental drug overdose    Essential hypertension, benign    GERD (gastroesophageal reflux disease)    Dementia (HCC)    Polypharmacy    Malnutrition (HCC)    Acute delirium    Pericardial effusion    Pain syndrome, chronic    Critical aortic valve stenosis    Ampulla of Vater mass    Palliative care by specialist    Moderate [facility]calorie malnutrition [facility]     82 y. o. female history of dementia, GERD, hypertension, critical aortic OP.  -Will continue to follow        [address] Cherri Rojas Said   [phone]      The patient was seen and examined, I agree with HAIDER Silvestre-CNP  findings, assessment and plan as outlined.      Laina Grewal MD   HEMATOLOGY/MEDICAL [address] [facility],  [address] [facility] [address]  [facility] [address]  Dept: [phone]

## 2021-02-26 NOTE — PROGRESS NOTES
Hospitalist Progress Note      PCP: Dashawn Rosenthal DO    Date of Admission: 2/19/2021  Days in the hospital: [address] Course:   82 y. o. female history of dementia, GERD, hypertension, critical aortic stenosis who was sent from Dr. Placido agarwal for evaluation of presyncope symptoms.  The patient had recent admission in January 2021 for presyncope. [facility] was noted to have large pericardial effusion with tamponade physiology.  She had emergent pericardiocentesis with 1.4 liters removed by CT surgery.  Cytology suspicious for adenocarcinoma.  She was also noted to have critical AS.  Work-up for TAVR was to be started. Kaila Wilhelm was seen in cardiology clinic today for follow-up. Homar Thornton states she has dyspnea on ambulation.  She has intermittent lightheadedness on standing.  She has dementia.  Some of the history is collaborated from her son Jacque Kyle. In ER, BP was 108/60 on arrival.  Lab work is pertinent for BNP 7256, troponin <0.01.  CTA chest/abdomen/pelvis pending.  Emergent echocardiogram shows moderate circumferential pericardial effusion with no tamponade physiology     Patient underwent TAVR. She is being followed by oncology, surgery, oncology, palliative care. Subjective  Patient seen and examined at bedside. Had endoscopic ultrasound done, no ampullary mass noted, pathology is pending, she otherwise denies any headache or dizziness. She is afebrile. Chart reviewed, overnight events reviewed. She is forgetful. Exam:    BP (!) 93/50   Pulse 56   Temp 98.1 °F (36.7 °C) (Oral)   Resp 16   Ht 5' 8\" (1.727 m)   Wt 140 lb (63.5 kg)   SpO2 95%   BMI 21.29 kg/m²     HEENT: No pallor, no icterus. Respiratory:  CTA, good air entry. Cardiovascular: RRR, no murmur. Pericardial drain noted  Abdomen: Soft, non-tender, BS noted. Musculoskeletal: No joint pains or joint swelling noted.    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]     Critical aortic valve stenosis [I35.0] 01/29/2021     · Large pericardial effusion  · Intrahepatic and extrahepatic biliary duct dilatation    Plan:  · Status post TAVR, continue to follow-up with cardiology closely  · Pericardial drain in place, continue to monitor output  · Status post endoscopic ultrasound, pathology pending, follow-up with oncology  · Follow-up MRI  · Continue with rest of her medications      Labs:   Recent Labs     02/24/21  0820 02/25/21  1153 02/26/21  0434   WBC 7.2 9.6 7.6   HGB 12.6 10.6* 11.0*   HCT 40.4 35.9 35.4    155 143     Recent Labs     02/25/21  1153 02/26/21  0434    139   K 3.9 4.0    104   CO2 28 30*   BUN 24* 18   CREATININE 0.8 0.8   CALCIUM 8.6 8.6     No results for input(s): AST, ALT, BILIDIR, BILITOT, ALKPHOS in the last 72 hours. Recent Labs     02/23/21  1443   INR 1.0     No results for input(s): Fela Mccarty in the last 72 hours.     Medications:  Reviewed    Infusion Medications    insulin      dextrose      sodium chloride 75 mL/hr at 02/25/21 2000     Scheduled Medications    metoprolol succinate  50 mg Oral Daily    sodium chloride flush  10 mL Intravenous 2 times per day    aspirin  81 mg Oral Daily    insulin glargine  0.15 Units/kg Subcutaneous Nightly    polyethylene glycol  17 g Oral Daily    docusate sodium  100 mg Oral BID    venlafaxine  75 mg Oral Daily    enoxaparin  40 mg Subcutaneous Daily     PRN Meds: perflutren lipid microspheres, sodium chloride flush, oxyCODONE-acetaminophen, glucose, dextrose, glucagon (rDNA), dextrose, perflutren lipid microspheres, acetaminophen, bisacodyl, promethazine **OR** ondansetron, acetaminophen **OR** acetaminophen, potassium chloride **OR** potassium alternative oral replacement **OR** potassium chloride, meclizine      Intake/Output Summary (Last 24 hours) at 2/26/2021 1247  Last data filed at 2/26/2021 0731  Gross per 24 hour   Intake 370 ml   Output 3900 ml   Net -3530 ml     · Body mass index is 21.29 kg/m². · Diet  · DIET CLEAR LIQUID;  · Dietary Nutrition Supplements: Clear Liquid Oral Supplement    · Code Status  · Full Code     Electronically signed by Olita Olszewski, MD on 2/26/2021 at 12:47 PM  [employer]   Please contact me through perfect serve    NOTE: This report was transcribed using voice recognition software. Every effort was made to ensure accuracy; however, inadvertent computerized transcription errors may be present.

## 2021-02-26 NOTE — PROGRESS NOTES
[facility]  [address] [facility], [address]  [facility], [facility]      Pulmonary Progress Note      SUBJECTIVE:  Pt being seen for Dr Kapoor Re down in bed on RA, claims doing ok with breathing with not much coughing. Hasn't ambulated much in room.     OBJECTIVE    Medications    Continuous Infusions:   insulin      dextrose      sodium chloride 75 mL/hr at 21       Scheduled Meds:   metoprolol succinate  50 mg Oral Daily    sodium chloride flush  10 mL Intravenous 2 times per day    aspirin  81 mg Oral Daily    insulin glargine  0.15 Units/kg Subcutaneous Nightly    polyethylene glycol  17 g Oral Daily    docusate sodium  100 mg Oral BID    venlafaxine  75 mg Oral Daily    enoxaparin  40 mg Subcutaneous Daily       PRN Meds:perflutren lipid microspheres, sodium chloride flush, oxyCODONE-acetaminophen, glucose, dextrose, glucagon (rDNA), dextrose, perflutren lipid microspheres, acetaminophen, bisacodyl, promethazine **OR** ondansetron, acetaminophen **OR** acetaminophen, potassium chloride **OR** potassium alternative oral replacement **OR** potassium chloride, meclizine    Physical    VITALS:  BP (!) 141/65   Pulse 56   Temp 98 °F (36.7 °C) (Oral)   Resp 18   Ht 5' 8\" (1.727 m)   Wt 140 lb (63.5 kg)   SpO2 (!) 56%   BMI 21.29 kg/m²     24HR INTAKE/OUTPUT:      Intake/Output Summary (Last 24 hours) at 2021 1644  Last data filed at 2021 1451  Gross per 24 hour   Intake 1870 ml   Output 4078 ml   Net -2208 ml       24HR PULSE OXIMETRY RANGE:    SpO2  Av.9 %  Min: 56 %  Max: 100 %    General appearance: alert, appears stated age and cooperative  Lungs: rhonchi bibasilar  Heart: systolic murmur, (+) CT  Abdomen: soft, non-tender; bowel sounds normal; no masses,  no organomegaly  Extremities: extremities normal, atraumatic, no cyanosis or edema  Neurologic: Mental status: Alert, oriented, thought content appropriate    Data    CBC:   Recent Labs     02/24/21  0820 02/25/21  1153 02/26/21  0434   WBC 7.2 9.6 7.6   HGB 12.6 10.6* 11.0*   HCT 40.4 35.9 35.4   MCV 87.1 89.5 87.0    155 143       BMP:  Recent Labs     02/25/21  1153 02/26/21  0434    139   K 3.9 4.0    104   CO2 28 30*   BUN 24* 18   CREATININE 0.8 0.8    ALB:3,BILIDIR:3,BILITOT:3,ALKPHOS:3)@    PT/INR:   No results for input(s): PROTIME, INR in the last 72 hours. ABG:   No results for input(s): PH, PO2, PCO2, HCO3, BE, O2SAT, METHB, O2HB, COHB, O2CON, HHB, THB in the last 72 hours. Radiology/Other tests reviewed: none    Assessment:     Active Problems:    Critical aortic valve stenosis    Ampulla of Vater mass    Palliative care by specialist    Moderate protein-calorie malnutrition ([facility] [address]  Resolved Problems:    * No resolved hospital problems. *      Plan:       1. Cardiac issues as per Cardiology and CT surgery  2. On RA and no lung medications, observe oxygenation and respiratory function  3. CXR to reassess effusion  4. OOB to chair with assistance  5. Possible malignance from pericardial fluid, as per Oncology, await official results      Time at the bedside, reviewing labs and radiographs, reviewing notes and consultations, discussing with staff and family was more than 35 minutes. Thanks for letting us see this patient in consultation. Please contact us with any questions. Office [phone] or after hours through [place], x [phone].

## 2021-02-26 NOTE — PROGRESS NOTES
Palliative Medicine  Progress Note    Palliative Care Department  [phone]  Palliative Care Initial Consult  Paul Salonifreya ARTEAGA, [facility]  [id number]  Hospital Day: 8    Date of Initial Consult: 2//23/21  Referring Provider: Joceline MCDONNELL    Palliative Medicine was consulted for assistance with:  Assist with goals of care, Symptom Management and Family Support      HPI:   Jayson Guerra is a 82 y.o. female with significant past medical history of severe aortic stenosis and a recent large pericardial effusion s/p pericardiocentesis (1.4L)  with cytology suspicious for adenocarcinoma. She underwent further workup and is noted to have extrahepatic and central intrahepatic biliary ductal dilation and there is concern for a possible ampullary neoplasm. She was admitted on 2/19/2021 after follow up with cardiology and sent in for further management of her severe aortic stenosis, as she has been having presyncope. She is planned to have TAVR and possibly a pericardial window performed. Oncology and surgery are following closely and plan to further work up possible neoplasm after AS is addressed. ASSESSMENT/PLAN:     Pertinent Hospital Diagnoses   · Ampulla of Vater mass- underwent endoscopy biopsy today   · Large malignant pericardial effusion s/p pericardiocentesis s/p drain  · Critical Aortic valve stenosis s/p TAVR 2/24; Cardiology following  · Depression/Anxiety-Psychiatry to see  · Constipation- has bowel regimen in place; colace; glycolax; dulcolax prn       Palliative Care Encounter / Counseling Regarding Goals of [address] [place], Does have capacity for medical decision-making.   Capacity is time limited and situation/question specific   Outcome of goals of care meeting:  continue current workup and results will determine plan of care; likely to return to [facility] on d/c   Code status Full Code-continue   Advanced Directives: no HPOA or Living Will noted in chart   Surrogate/Legal NOK: Quin Graves () is the patient's son    PDMP Monitoring system was reviewed today with no issues noted and is concordant with patient report. Follows with Pain Management - takes Morphine sulfate ER 15 mg daily for chronic back pain. Details of Conversation:    2/26/21 Underwent Endoscopic US today for possible ampullary mass; fine needle biopsy taken with results pending. States had nausea when she returned but it has since subsided. Pt states she is feeling less depressed today; has less abdominal pain but denies having BM. Requested dulcolax be given if no BM; none recorded. Pt states she wants to remain hopeful and her goal is to return back home. Spoke with staff nurse. Further discussion will ensue once results known. Will refer to Palliative Medicine clinic for follow up call. Message left for son Noel Campbell to provide update and explain role; answer questions and offer support. EGD/EUS findings:              Esophagus: normal              Stomach: normal              Duodenum: Mild fullness of the ampulla without convincing evidence for an ampullary mass. Evaluation under endoscopic ultrasound did not reveal any abnormality in the duodenal wall at the level of the ampulla. There is normal tapering of the common bile duct and main pancreatic duct to the ampulla. The region of the ampulla was biopsied using a 22-gauge FNA needle making 2 passes. Endoscopic forcep biopsies were also used to grasp 2 samples from the ampulla. Pancreas: 3 mm pancreatic duct in the pancreatic head which tapers down to 2 mm in the body and tail. Multiple subcentimeter cyst along the pancreatic duct consistent with IPMN's. Bile Duct: Dilated to 12 mm              Gallbladder: Surgically absent       2/25/21 Pt seen. Appears weak with weak voice quality today. States she feels depressed and just wants to go back home.  Psychiatry has been consulted for input. She states she thinks she took an antidepressant years ago; is aware that workup continues and is fearful of the results. Pt underwent TAVR yesterday- no complaints of chest pain. Plan for endoscopic ultrasound on Friday. MRI of brain pending. She does complain of abdominal pain; states doesn't remember last BM; was just given colace and glycolax. Appreciative for chance to discuss her feelings and states she is able to talk to her son about it as well. Will need further discussion about goals and code status once workup results known. Spoke with staff nurse. Will follow along. 2/23/2021:  Per Ashleigh Pedraza CNP Mrs. Hakeem Dobson was seen at there bedside today, no family present. She is alert and oriented, however she does not appear to be a very good historian. She does not demonstrate good knowledge of her current condition but does express some stress and concern regarding her needs. She admits to feeling overwhelmed by what is going on with her but is not able to express a good understanding of her needs or current plan of care. She is for pulmonary function test at this time and is agreeable having palliative care follow along to provide support for her through her hospitalization.     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]     Critical aortic valve stenosis [I35.0] 01/29/2021           OBJECTIVE:   Prognosis: unknown    Physical Exam:  BP (!) 93/50   Pulse 56   Temp 98.1 °F (36.7 °C) (Oral)   Resp 16   Ht 5' 8\" (1.727 m)   Wt 140 lb (63.5 kg)   SpO2 95%   BMI 21.29 kg/m²   Gen:   NAD, awake, alert; feeling better today  HEENT:  Normocephalic, atraumatic, mucosa moist, EOMI  Neck:  Supple, trachea midline, no JVD  Lungs:  CTA bilaterally, no audible rhonchi or wheezes noted, respirations unlabored  Heart:  NSR per monitor; RRR, pericardial drain in place  Abd:  Soft, tender, non distended, bowel sounds present; no BM  : vazquez catheter in place  Ext:  Moving all extremities, no edema, pulses present  Skin:  Warm and dry  Neuro:  PERRL, Alert, oriented x 3; following commands    Social History:   The patient currently lives [address]:  reports that she quit smoking about 13 years ago. She quit after 45.00 years of use. She has never used smokeless tobacco.  ETOH:  reports no history of alcohol use. Objective data reviewed: labs, images, records, medication use, vitals and chart    Discussed patient and the plan of care with the other IDT members: Palliative Medicine IDT Team    Time/Communication  Greater than 50% of time spent, total 15 minutes in counseling and coordination of care at the bedside regarding goals of care, symptom management, diagnosis and prognosis and see above. Thank you for allowing Palliative Medicine to participate in the care of [employer].   Isaías MALONEY-CNS, ACHPN

## 2021-02-26 NOTE — PROGRESS NOTES
Physical Therapy    Patient is currently on PT caseload and treatment attempted this AM.  Patient is off unit for procedure. Will re-attempt at a later time.    Kaley Wren, PT, DPT  License [id number]

## 2021-02-26 NOTE — PROGRESS NOTES
Comprehensive Nutrition Assessment    Type and Reason for Visit:  Initial, Consult    Nutrition Recommendations/Plan: Advance diet when medically appropriate. Recommend and start Ensure Clear supplement BID to help meet increased nutritional needs. Advance supplement to Ensure Enlive BID when diet advances. Nutrition Assessment:  Patient at nutritional risk d/t overall decreased po intake of meals x 6 days since admission 2/2 abd pain ; pt meets criteria for moderate malnutrition AEB poor po intake >1 month, weight loss, and muscle/fat wasting ; adm w/ intrahepatic/extrahepatic duct dilation/pancreatic duct dilation ; s/p TAVR on 2/24 ; s/p EGD 2/26 ; hx of dementia ; noted pericardiocentesis d/t worsening pericardial effusion ; possible ampullary mass ; possible adenocarcinoma ; will start ONS    Malnutrition Assessment:  Malnutrition Status: Moderate malnutrition    Context:  Chronic Illness     Findings of the 6 clinical characteristics of malnutrition:  Energy Intake:  7 - 75% or less estimated energy requirements for 1 month or longer  Weight Loss:  7 - Greater than 5% over 1 month     Body Fat Loss:  1 - Mild body fat loss Orbital, Triceps   Muscle Mass Loss:  1 - Mild muscle mass loss Temples (temporalis), Clavicles (pectoralis & deltoids)  Fluid Accumulation:  No significant fluid accumulation     Strength:  Not Performed    Estimated Daily Nutrient Needs:  Energy (kcal):  3641-1013 (REE 1140 x 1.2 SF); Weight Used for Energy Requirements:  Current     Protein (g):  75-90 (1.2-1.4g/kg CBW); Weight Used for Protein Requirements:  Current        Fluid (ml/day):  8178-6208; Method Used for Fluid Requirements:  1 ml/kcal      Nutrition Related Findings:  -I&Os (-1.1 L), 1+ edema, A&O x 4, upper dentures, [place], hypoactive BS, chest tube x 1, abd pain, muscle/fat wasting, puncture sites x 2      Wounds:  None       Current Nutrition Therapies:    DIET CLEAR LIQUID;     Anthropometric Measures:  · Height: 5' 8\" (172.7 cm)  · Current Body Weight: 140 lb (63.5 kg)(2/19, actual)   · Usual Body Weight: 151 lb (68.5 kg)(1/24/21, bedscale ; EMR shows weight loss of 11# in the past 1 month (151# to 140#) (7%))     · Ideal Body Weight: 140 lbs; % Ideal Body Weight 100 %   · BMI: 21.3  · BMI Categories: Normal Weight (BMI 22.0 to 24.9) age over 72       Nutrition Diagnosis:   · Moderate malnutrition, In context of chronic illness related to inadequate protein-energy intake(2/2 abd pain) as evidenced by poor intake prior to admission, weight loss greater than or equal to 5% in 1 month, mild muscle loss, mild loss of subcutaneous fat      Nutrition Interventions:   Food and/or Nutrient Delivery:  Continue Current Diet, Start Oral Nutrition Supplement  Nutrition Education/Counseling:  Education not indicated   Coordination of Nutrition Care:  Continue to monitor while inpatient    Goals:  Advance diet when medically appropriate       Nutrition Monitoring and Evaluation:   Behavioral-Environmental Outcomes:  Beliefs and Attitutes   Food/Nutrient Intake Outcomes:  Diet Advancement/Tolerance, Food and Nutrient Intake, Supplement Intake  Physical Signs/Symptoms Outcomes:  Biochemical Data, Chewing or Swallowing, GI Status, Fluid Status or Edema, Hemodynamic Status, Meal Time Behavior, Nutrition Focused Physical Findings, Weight, Skin     Discharge Planning:     Too soon to determine     Electronically signed by David Henderson RD, LD on 2/26/21 at 10:33 AM EST    Contact: [phone]

## 2021-02-26 NOTE — PROGRESS NOTES
Inpatient Cardiology Progress note     PATIENT IS BEING FOLLOWED FOR: Critical AS     Prasanth Bryant is an 82year old  female who follows with Dr. Gumaro Larkin. SUBJECTIVE: Denies chest pain and dyspnea. Denies pain to bilateral groins or CT insertion site. OBJECTIVE: No apparent distress     ROS:  Consist: Denies fevers, chills or night sweats  Heart: Denies chest pain, palpitations, lightheadedness, dizziness or syncope  Lungs: Denies SOB, cough, wheezing, orthopnea or PND  GI: Denies abdominal pain, vomiting or diarrhea    PHYSICAL EXAM:   BP (!) 93/50   Pulse 56   Temp 98.1 °F (36.7 °C) (Oral)   Resp 16   Ht 5' 8\" (1.727 m)   Wt 140 lb (63.5 kg)   SpO2 95%   BMI 21.29 kg/m²    CONST: Well developed, well nourished, elderly female who appears stated age. Awake, alert and cooperative. No apparent distress  HEENT:   Head- Normocephalic, atraumatic   Eyes- Conjunctivae pink, anicteric  Throat- Oral mucosa pink and moist  Neck-  No stridor, trachea midline, no jugular venous distention. No carotid bruit  CHEST: Chest symmetrical and non-tender to palpation. No accessory muscle use or intercostal retractions  RESPIRATORY:  Lung sounds - clear throughout fields   CARDIOVASCULAR:     Heart Inspection- shows no noted pulsations  Heart Palpation- no heaves or thrills; PMI is non-displaced   Heart Ausculation- Regular rate and rhythm, 3/6 systolic ejection murmur heard over the upper right sternal border, late peaking and very harsh. S2 could not be heard. No s3, s4 or rub  PV: No lower extremity edema. No varicosities. Pedal pulses palpable, no clubbing or cyanosis   ABDOMEN: Soft, non-tender to light palpation. Bowel sounds present. No palpable masses no organomegaly; no abdominal bruit  MS: Good muscle strength and tone. No atrophy or abnormal movements. : Deferred  SKIN: Warm and dry no statis dermatitis or ulcers   NEURO / PSYCH: Oriented to person, place and time.  Speech clear and paravalvular aortic regurgitation. AV peak velocity 2.0 m/s. AV mean gradient 9 mmHg. Aortic valve area 2.25 cm2. Moderate-sized circumferential pericardial effusion. 02/25/2021 Limited Echocardiogram (Dr. Kiara Rosenthal):   **POD1 s/p TAVR with 23-mm CHAN 3 Ultra**   Normal left ventricle size. Estimated left ventricle ejection fraction   >75%. Moderate left ventricular concentric hypertrophy noted. Normal right ventricular size and function. There is a well-seated transcatheter valve in the aortic position. No   central or paravalvular regurgitation. MG 26, DVI 0.56, EOAi 0.9. There is   a hyperdynamic LV systolic function with an intracavitary gradient of   14mmHg.       02/26/2021 Limited Echocardiogram (Dr. Kiara Rosenthal):  **Limited study for pericardial effusion - pigtail clamped for 24 hrs**   Normal left ventricle size. Estimated left ventricle ejection fraction   65+/-5 %. Normal right ventricular size and function. Small residual pericardial effusion, appears slightly less than on   yesterday's study. Large left pleural effusion. Telemetry: SB with HR now 59  12 lead EKG: NA     ASSESSMENT:   1. Critical Symptomatic AS s/p TAVR 02/24/2021  2. Recurrent pericardial effusion with cytology suspicious for adenocarcinoma s/p drain placement 02/24/2021 with limited echocardiogram 02/26/2021 with small residual pericardial effusion appears slightly less than on 02/25/2021 study  1. Now removed 2/26/21  3. CT chest/abdomen/pelvis did not reveal widespread metastases but there was significant pancreatic and bile duct dilation. MRCP suggested a small ampullary mass. S/p EUS 02/26/2021 with FNA of the ampulla  4. Hypertension: Controlled   5. Former smoker  6. Mild cognitive impairment    PLAN:  1. Limited Echocardiogram for pericardial effusion next week (office to call with a scheduled time for March 3 or 4)  2.  Aspirin monotherapy status post TAVR  3. Endocarditis prophylaxis indefinitely before high-risk

## 2021-02-26 NOTE — ANESTHESIA POSTPROCEDURE EVALUATION
Department of Anesthesiology  Postprocedure Note    Patient: Juani Mcneil  MRN: [id number]  YOB: 1935  Date of evaluation: 2/26/2021  Time:  9:06 AM     Procedure Summary     Date: 02/26/21 Room / Location: [facility] 03 / [facility]    Anesthesia Start:  Anesthesia Stop:     Procedure: ENDOSCOPIC EGD ULTRASOUND (N/A ) Diagnosis: (.)    Surgeons: Jose Yanez MD Responsible Provider:     Anesthesia Type: MAC ASA Status: 2          Anesthesia Type: MAC    Stacey Phase I: Stacey Score: 9    Stacey Phase II:      Last vitals: Reviewed and per EMR flowsheets.        Anesthesia Post Evaluation    Patient location during evaluation: PACU  Patient participation: complete - patient participated  Level of consciousness: awake  Pain score: 3  Airway patency: patent  Nausea & Vomiting: no nausea and no vomiting  Complications: no  Cardiovascular status: blood pressure returned to baseline  Respiratory status: acceptable  Hydration status: euvolemic

## 2021-02-26 NOTE — CARE COORDINATION
2/26/2021 - POD#2 TAVR. Cardiology, pulmonology, Hematology, palliative care following pt. EGD/EUS done yesterday. Chest tube intact. Plan is to return to [facility] when medically stable. Will need therapy evals and a precert to return there. Discharge paperwork in envelope on soft chart. SW/CM will follow.

## 2021-02-27 ENCOUNTER — APPOINTMENT (OUTPATIENT)
Dept: GENERAL RADIOLOGY | Age: 86
DRG: 267 | End: 2021-02-27
Payer: MEDICARE

## 2021-02-27 LAB
ANION GAP SERPL CALCULATED.3IONS-SCNC: 5 MMOL/L (ref 7–16)
BUN BLDV-MCNC: 12 MG/DL (ref 8–23)
CALCIUM SERPL-MCNC: 8.7 MG/DL (ref 8.6–10.2)
CHLORIDE BLD-SCNC: 104 MMOL/L (ref 98–107)
CO2: 30 MMOL/L (ref 22–29)
CREAT SERPL-MCNC: 0.8 MG/DL (ref 0.5–1)
GFR AFRICAN AMERICAN: >60
GFR NON-AFRICAN AMERICAN: >60 ML/MIN/1.73
GLUCOSE BLD-MCNC: 92 MG/DL (ref 74–99)
HCT VFR BLD CALC: 37.3 % (ref 34–48)
HEMOGLOBIN: 11.1 G/DL (ref 11.5–15.5)
MCH RBC QN AUTO: 26.6 PG (ref 26–35)
MCHC RBC AUTO-ENTMCNC: 29.8 % (ref 32–34.5)
MCV RBC AUTO: 89.4 FL (ref 80–99.9)
PDW BLD-RTO: 17.1 FL (ref 11.5–15)
PLATELET # BLD: 144 E9/L (ref 130–450)
PMV BLD AUTO: 12.2 FL (ref 7–12)
POTASSIUM SERPL-SCNC: 4 MMOL/L (ref 3.5–5)
RBC # BLD: 4.17 E12/L (ref 3.5–5.5)
SODIUM BLD-SCNC: 139 MMOL/L (ref 132–146)
WBC # BLD: 9.5 E9/L (ref 4.5–11.5)

## 2021-02-27 PROCEDURE — 6370000000 HC RX 637 (ALT 250 FOR IP): Performed by: PHYSICIAN ASSISTANT

## 2021-02-27 PROCEDURE — 2580000003 HC RX 258: Performed by: PHYSICIAN ASSISTANT

## 2021-02-27 PROCEDURE — 6360000002 HC RX W HCPCS: Performed by: PHYSICIAN ASSISTANT

## 2021-02-27 PROCEDURE — 71045 X-RAY EXAM CHEST 1 VIEW: CPT

## 2021-02-27 PROCEDURE — 36415 COLL VENOUS BLD VENIPUNCTURE: CPT

## 2021-02-27 PROCEDURE — 85027 COMPLETE CBC AUTOMATED: CPT

## 2021-02-27 PROCEDURE — 99232 SBSQ HOSP IP/OBS MODERATE 35: CPT | Performed by: INTERNAL MEDICINE

## 2021-02-27 PROCEDURE — 80048 BASIC METABOLIC PNL TOTAL CA: CPT

## 2021-02-27 PROCEDURE — 2140000000 HC CCU INTERMEDIATE R&B

## 2021-02-27 PROCEDURE — 99233 SBSQ HOSP IP/OBS HIGH 50: CPT | Performed by: INTERNAL MEDICINE

## 2021-02-27 RX ADMIN — POLYETHYLENE GLYCOL 3350 17 G: 17 POWDER, FOR SOLUTION ORAL at 10:40

## 2021-02-27 RX ADMIN — METOPROLOL SUCCINATE 50 MG: 50 TABLET, EXTENDED RELEASE ORAL at 10:40

## 2021-02-27 RX ADMIN — SODIUM CHLORIDE, PRESERVATIVE FREE 10 ML: 5 INJECTION INTRAVENOUS at 10:41

## 2021-02-27 RX ADMIN — ASPIRIN 81 MG: 81 TABLET, COATED ORAL at 10:40

## 2021-02-27 RX ADMIN — ACETAMINOPHEN 650 MG: 325 TABLET ORAL at 22:27

## 2021-02-27 RX ADMIN — VENLAFAXINE 75 MG: 75 TABLET ORAL at 10:41

## 2021-02-27 RX ADMIN — ACETAMINOPHEN 650 MG: 325 TABLET ORAL at 10:41

## 2021-02-27 RX ADMIN — ENOXAPARIN SODIUM 40 MG: 40 INJECTION SUBCUTANEOUS at 10:39

## 2021-02-27 RX ADMIN — DOCUSATE SODIUM 100 MG: 100 CAPSULE, LIQUID FILLED ORAL at 10:39

## 2021-02-27 ASSESSMENT — PAIN SCALES - GENERAL
PAINLEVEL_OUTOF10: 0
PAINLEVEL_OUTOF10: 4

## 2021-02-27 ASSESSMENT — PAIN DESCRIPTION - PAIN TYPE: TYPE: ACUTE PAIN

## 2021-02-27 ASSESSMENT — PAIN DESCRIPTION - LOCATION
LOCATION: HEAD
LOCATION: HEAD

## 2021-02-27 NOTE — PROGRESS NOTES
Hospitalist Progress Note      PCP: Taylor Peng DO    Date of Admission: 2/19/2021  Days in the hospital: [facility] Course:   82 y. o. female history of dementia, GERD, hypertension, critical aortic stenosis who was sent from Dr. Placido agarwal for evaluation of presyncope symptoms.  The patient had recent admission in January 2021 for presyncope. Holly Brunner was noted to have large pericardial effusion with tamponade physiology.  She had emergent pericardiocentesis with 1.4 liters removed by CT surgery.  Cytology suspicious for adenocarcinoma.  She was also noted to have critical AS.  Work-up for TAVR was to be started. Holly Brunner was seen in cardiology clinic today for follow-up. Brigitte Baker states she has dyspnea on ambulation.  She has intermittent lightheadedness on standing.  She has dementia.  Some of the history is collaborated from her son Rocio Richardson. In ER, BP was 108/60 on arrival.  Lab work is pertinent for BNP 7256, troponin <0.01.  CTA chest/abdomen/pelvis pending.  Emergent echocardiogram shows moderate circumferential pericardial effusion with no tamponade physiology     Patient underwent TAVR. She is being followed by oncology, surgery, oncology, palliative care. Subjective  Patient seen and examined at bedside. Denies SOB. Reports pain in her backside from sitting in the bed but refuses to lay on either side. Doesn't want to get out of bed. Exam:    /64   Pulse 54   Temp 97.2 °F (36.2 °C) (Oral)   Resp 16   Ht 5' 8\" (1.727 m)   Wt 140 lb (63.5 kg)   SpO2 96%   BMI 21.29 kg/m²     Constitutional: laying in bed, NAD  HEENT: No pallor, no icterus. Respiratory:  CTA, good air entry. Cardiovascular: RRR, no murmur. Abdomen: Soft, non-tender, BS noted. Musculoskeletal: No joint pains or joint swelling noted.    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      Labs:   Recent Labs     02/25/21  1153 02/26/21  0434 02/27/21  0445   WBC 9.6 7.6 9.5   HGB 10.6* 11.0* 11.1*   HCT 35.9 35.4 37.3    143 144     Recent Labs     02/25/21  1153 02/26/21  0434 02/27/21  0445    139 139   K 3.9 4.0 4.0    104 104   CO2 28 30* 30*   BUN 24* 18 12   CREATININE 0.8 0.8 0.8   CALCIUM 8.6 8.6 8.7     No results for input(s): AST, ALT, BILIDIR, BILITOT, ALKPHOS in the last 72 hours. No results for input(s): INR in the last 72 hours. No results for input(s): Irene Speaks in the last 72 hours.     Medications:  Reviewed    Infusion Medications    insulin      dextrose      sodium chloride 75 mL/hr at 02/25/21 2000     Scheduled Medications    metoprolol succinate  50 mg Oral Daily    sodium chloride flush  10 mL Intravenous 2 times per day    aspirin  81 mg Oral Daily    insulin glargine  0.15 Units/kg Subcutaneous Nightly    polyethylene glycol  17 g Oral Daily    docusate sodium  100 mg Oral BID    venlafaxine  75 mg Oral Daily    enoxaparin  40 mg Subcutaneous Daily     PRN Meds: perflutren lipid microspheres, sodium chloride flush, oxyCODONE-acetaminophen, glucose, dextrose, glucagon (rDNA), dextrose, perflutren lipid microspheres, acetaminophen, bisacodyl, promethazine **OR** ondansetron, acetaminophen **OR** acetaminophen, potassium chloride **OR** potassium alternative oral replacement **OR** potassium chloride, meclizine      Intake/Output Summary (Last 24 hours) at 2/27/2021 0920  Last data filed at 2/27/2021 0430  Gross per 24 hour   Intake 3140 ml   Output 2828 ml   Net 312 ml     Body mass index is 21.29 kg/m². · Diet  Dietary Nutrition Supplements: Clear Liquid Oral Supplement  DIET LOW FAT;    · Code Status  Full Code     Electronically signed by Doreen Kanner, DO [employer]   Please contact me through perfect serve    .

## 2021-02-27 NOTE — PROGRESS NOTES
INPATIENT MEDICAL ONCOLOGY PROGRESS NOTE    SUBJECTIVE:    Afebrile. The patient is feeling better overall, no pain, she wants to go home. OBJECTIVE  VITALS:  /64   Pulse 54   Temp 97.2 °F (36.2 °C) (Oral)   Resp 16   Ht 5' 8\" (1.727 m)   Wt 140 lb (63.5 kg)   SpO2 96%   BMI 21.29 kg/m²   ENT:  oropharynx clear  NECK:  No  lymphadenopathy. HEMATOLOGIC/LYMPHATICS:  No abnormal lymphadenopathy. LUNGS:  Clear to auscultation, room air , CT draining pink/clear fluid  CARDIOVASCULAR:  Regular rate and rhythm. No clicks, murmurs, rubs or gallops. ABDOMEN:  Soft, nontender. No ascites. No mass or organomegaly. NEUROLOGIC:  No focal deficits. Anxious   EXTREMITIES: without clubbing, cyanosis, or edema. DIAGNOSTIC DATA  Labs:    Lab Results   Component Value Date    WBC 9.5 02/27/2021    HGB 11.1 (L) 02/27/2021    HCT 37.3 02/27/2021    MCV 89.4 02/27/2021     02/27/2021     Lab Results   Component Value Date     02/27/2021    K 4.0 02/27/2021     02/27/2021    CO2 30 (H) 02/27/2021    BUN 12 02/27/2021    CREATININE 0.8 02/27/2021    GLUCOSE 92 02/27/2021    CALCIUM 8.7 02/27/2021    PROT 6.3 (L) 02/23/2021    LABALBU 3.6 02/23/2021    BILITOT 0.2 02/23/2021    ALKPHOS 57 02/23/2021    AST 17 02/23/2021    ALT 9 02/23/2021    LABGLOM >60 02/27/2021    GFRAA >60 02/27/2021     Lab Results   Component Value Date    CEA 7.0 (H) 02/21/2021     IMPRESSION:  Patient Active Problem List   Diagnosis    Anxiety    Migraine headache    Accidental drug overdose    Essential hypertension, benign    GERD (gastroesophageal reflux disease)    Dementia (HCC)    Polypharmacy    Malnutrition (HCC)    Acute delirium    Pericardial effusion    Pain syndrome, chronic    Critical aortic valve stenosis    Ampulla of Vater mass    Palliative care by specialist    Moderate protein-calorie malnutrition ([facility]     82 y. o. female history of dementia, GERD, hypertension, critical aortic malignant cells.   -Monitor CBCD, currently stable 11/35.4  plt: 143 transfuse hgb<7 plt<10   -PET scan as OP.  -Will continue to follow      Priya Condon MD   HEMATOLOGY/MEDICAL [address] [facility],  [address] MED ONCOLOGY  [facility] [address] [facility] [address]  Dept: [phone]

## 2021-02-27 NOTE — PROGRESS NOTES
[facility] SURGERY  DAILY PROGRESS NOTE  2/27/2021    CC: weightloss    Subjective:  Pt doing well. No pain, reports feeling down. She wants to go home. Objective:  /64   Pulse 54   Temp 97.2 °F (36.2 °C) (Oral)   Resp 16   Ht 5' 8\" (1.727 m)   Wt 140 lb (63.5 kg)   SpO2 96%   BMI 21.29 kg/m²     GENERAL:  Laying in bed, awake, alert, cooperative, in no apparent distress  HEAD: Normocephalic  EYES: No sclera icterus  LUNGS:  No increased work of breathing  CARDIOVASCULAR:  Bradycardic and normotensive  ABDOMEN:  Soft, non- tender, non-distended  EXTREMITIES: No edema  SKIN: Warm    Assessment/Plan:  82 y.o. female  with intrahepatic and extrahepatic duct dilation, pancreatic duct dilation. Cytology from pericardial effusion concerning for possible adenocarcinoma.     - Overall care per primary   - Tumor markers: Chromogrannin A 56, CEA (7), CA 19-9 (6), AFP (3)  - Pain and antiemetic control PRN  - EUS showed no ampullary mass  - F/U biopsies    Electronically signed by Rolo Bustamante MD on 2/27/2021 at 7:49 AM

## 2021-02-27 NOTE — PROGRESS NOTES
ENDOSCOPIC SURGERY  DAILY PROGRESS NOTE  2/27/2021    CC: weightloss    Subjective:  Pt doing well. No pain, reports feeling down. She wants to go home. Objective:  /64   Pulse 54   Temp 97.2 °F (36.2 °C) (Oral)   Resp 16   Ht 5' 8\" (1.727 m)   Wt 140 lb (63.5 kg)   SpO2 96%   BMI 21.29 kg/m²     GENERAL:  Laying in bed, awake, alert, cooperative, in no apparent distress  HEAD: Normocephalic  EYES: No sclera icterus  LUNGS:  No increased work of breathing  CARDIOVASCULAR:  Bradycardic and normotensive  ABDOMEN:  Soft, non- tender, non-distended  EXTREMITIES: No edema  SKIN: Warm    Assessment/Plan:  82 y.o. female  with intrahepatic and extrahepatic duct dilation, pancreatic duct dilation. Cytology from pericardial effusion concerning for possible adenocarcinoma.     - Overall care per primary   - Tumor markers: Chromogrannin A 56, CEA (7), CA 19-9 (6), AFP (3)  - Pain and antiemetic control PRN  - EUS showed no ampullary mass  - F/U biopsies    Electronically signed by Kayce Argueta MD on 2/27/2021 at 8:06 AM

## 2021-02-27 NOTE — PROGRESS NOTES
[place]  Department of Pulmonary, Critical Care and [address] [facility]  Department of Internal Medicine  Progress Note    SUBJECTIVE:    No CP,   SOB improved  Tolerating current oxygen therapy   Reviewed overnight events with staff. Checked the monitors & laboratory tests. No complaints of pain at this time. OBJECTIVE:  Vitals:    02/26/21 1500 02/26/21 1906 02/27/21 0430 02/27/21 0900   BP:  132/62 128/64 (!) 107/55   Pulse:  57 54 60   Resp:  17 16 16   Temp:  97.2 °F (36.2 °C)  96.7 °F (35.9 °C)   TempSrc:  Oral  Temporal   SpO2: 96%   95%   Weight:       Height:         Constitutional: Alert,     EENT: EOMI FERNANDEZ. MMM. No icterus. No thrush. Neck: No thyromegaly. No elevated JVP. Trachea was midline. Respiratory: Symmetrical.  Breath sounds were clear. Cardiovascular: Regular, No murmur. No rubs. Pulses:  Equal bilaterally. Abdomen: Soft without organomegaly. No rebound, rigidity. No guarding. Lymphatic: No lymphadenopathy. Musculoskeletal: Without weakness or gross deficits  Extremities:  No lower extremity edema. Reflexes appear adequate. Skin:  Warm and dry. No skin rashes. Neurological/Psychiatric: No acute psychosis. Cranial nerves are intact. DATA:    Monitor Strips:  Reviewed & discusses with technical team. No changes noted.     RADIOLOGY:  Films were read/reviewed/discussed with radiology shows resolved effusion      CBC:   Lab Results   Component Value Date    WBC 9.5 02/27/2021    RBC 4.17 02/27/2021    HGB 11.1 02/27/2021    HCT 37.3 02/27/2021    MCV 89.4 02/27/2021    MCH 26.6 02/27/2021    MCHC 29.8 02/27/2021    RDW 17.1 02/27/2021     02/27/2021    MPV 12.2 02/27/2021     CMP:    Lab Results   Component Value Date     02/27/2021    K 4.0 02/27/2021    K 4.3 02/23/2021     02/27/2021    CO2 30 02/27/2021    BUN 12 02/27/2021    CREATININE 0.8 02/27/2021    GFRAA >60 02/27/2021    LABGLOM >60 02/27/2021    GLUCOSE 92 02/27/2021    GLUCOSE 90 05/16/2012    PROT 6.3 02/23/2021    LABALBU 3.6 02/23/2021    LABALBU 4.2 05/16/2012    CALCIUM 8.7 02/27/2021    BILITOT 0.2 02/23/2021    ALKPHOS 57 02/23/2021    AST 17 02/23/2021    ALT 9 02/23/2021       CLINICAL ASSESMENT:  1. Critical AV stenosis  2. Ampulla mass  3. Malnutrion  4. Resolved Pleural effusion    PLAN: If needed the case was discussed with the care team  1. Effusion appears resolved on Image  2. On room air  3. Await cytology from other places  4.        Lenore Rai DO, MPH, [facility]  Professor of Medicine

## 2021-02-28 LAB
ANION GAP SERPL CALCULATED.3IONS-SCNC: 5 MMOL/L (ref 7–16)
BUN BLDV-MCNC: 18 MG/DL (ref 8–23)
CALCIUM SERPL-MCNC: 8.7 MG/DL (ref 8.6–10.2)
CHLORIDE BLD-SCNC: 105 MMOL/L (ref 98–107)
CO2: 29 MMOL/L (ref 22–29)
CREAT SERPL-MCNC: 0.8 MG/DL (ref 0.5–1)
GFR AFRICAN AMERICAN: >60
GFR NON-AFRICAN AMERICAN: >60 ML/MIN/1.73
GLUCOSE BLD-MCNC: 100 MG/DL (ref 74–99)
HCT VFR BLD CALC: 36.5 % (ref 34–48)
HEMOGLOBIN: 11 G/DL (ref 11.5–15.5)
MCH RBC QN AUTO: 26.8 PG (ref 26–35)
MCHC RBC AUTO-ENTMCNC: 30.1 % (ref 32–34.5)
MCV RBC AUTO: 89 FL (ref 80–99.9)
PDW BLD-RTO: 16.9 FL (ref 11.5–15)
PLATELET # BLD: 144 E9/L (ref 130–450)
PMV BLD AUTO: 11.9 FL (ref 7–12)
POTASSIUM SERPL-SCNC: 4.1 MMOL/L (ref 3.5–5)
RBC # BLD: 4.1 E12/L (ref 3.5–5.5)
SODIUM BLD-SCNC: 139 MMOL/L (ref 132–146)
WBC # BLD: 8.2 E9/L (ref 4.5–11.5)

## 2021-02-28 PROCEDURE — 6370000000 HC RX 637 (ALT 250 FOR IP): Performed by: PHYSICIAN ASSISTANT

## 2021-02-28 PROCEDURE — 97535 SELF CARE MNGMENT TRAINING: CPT

## 2021-02-28 PROCEDURE — 2140000000 HC CCU INTERMEDIATE R&B

## 2021-02-28 PROCEDURE — 99232 SBSQ HOSP IP/OBS MODERATE 35: CPT | Performed by: SURGERY

## 2021-02-28 PROCEDURE — 36415 COLL VENOUS BLD VENIPUNCTURE: CPT

## 2021-02-28 PROCEDURE — 80048 BASIC METABOLIC PNL TOTAL CA: CPT

## 2021-02-28 PROCEDURE — 99232 SBSQ HOSP IP/OBS MODERATE 35: CPT | Performed by: INTERNAL MEDICINE

## 2021-02-28 PROCEDURE — 85027 COMPLETE CBC AUTOMATED: CPT

## 2021-02-28 PROCEDURE — 6360000002 HC RX W HCPCS: Performed by: PHYSICIAN ASSISTANT

## 2021-02-28 PROCEDURE — 2580000003 HC RX 258: Performed by: PHYSICIAN ASSISTANT

## 2021-02-28 PROCEDURE — 99233 SBSQ HOSP IP/OBS HIGH 50: CPT | Performed by: INTERNAL MEDICINE

## 2021-02-28 RX ADMIN — SODIUM CHLORIDE, PRESERVATIVE FREE 10 ML: 5 INJECTION INTRAVENOUS at 20:47

## 2021-02-28 RX ADMIN — POLYETHYLENE GLYCOL 3350 17 G: 17 POWDER, FOR SOLUTION ORAL at 09:57

## 2021-02-28 RX ADMIN — VENLAFAXINE 75 MG: 75 TABLET ORAL at 09:58

## 2021-02-28 RX ADMIN — ASPIRIN 81 MG: 81 TABLET, COATED ORAL at 09:58

## 2021-02-28 RX ADMIN — SODIUM CHLORIDE, PRESERVATIVE FREE 10 ML: 5 INJECTION INTRAVENOUS at 09:58

## 2021-02-28 RX ADMIN — METOPROLOL SUCCINATE 50 MG: 50 TABLET, EXTENDED RELEASE ORAL at 09:58

## 2021-02-28 RX ADMIN — ENOXAPARIN SODIUM 40 MG: 40 INJECTION SUBCUTANEOUS at 09:57

## 2021-02-28 RX ADMIN — DOCUSATE SODIUM 100 MG: 100 CAPSULE, LIQUID FILLED ORAL at 20:48

## 2021-02-28 RX ADMIN — DOCUSATE SODIUM 100 MG: 100 CAPSULE, LIQUID FILLED ORAL at 09:58

## 2021-02-28 NOTE — PROCEDURES
[address]                  [address] [facility],  [facility]                               PULMONARY FUNCTION    PATIENT NAME: Alexandria Mcfadden                   :        1935  MED REC NO:   [id number]                            ROOM:       6322  ACCOUNT NO:   [de-identified]                           ADMIT DATE: 2021  PROVIDER:     Faye Coles DO    DATE OF PROCEDURE:  2021    IDENTIFYING DATA:  An 30-year-old  female, 68 inches, 140  pounds; 12 pack-year smoker. FINDINGS:  Spirometry reveals forced vital capacity of 2.46 liters, 87%  of predicted; with an FEV1 of 2.13 liters, 102% of predicted; with an  FEV1/FVC ratio of 87%. Midflow rates are normal with a maximum  voluntary ventilation of 50 liters per minute, 7% of predicted. Diffusion capacity is 15.96 mL/min per mmHg which is 53% of predicted  with correcting to alveolar ventilation of 87%. IMPRESSION:  Normal spirometry and corrected diffusion capacity. Clinical correlation needed.         Bhavani Casarez DO    D: 2021 20:08:50       T: 2021 20:19:47     [id number]  Job#: [id number]     Doc#: [id number]

## 2021-02-28 NOTE — PROGRESS NOTES
correct multiple LOBs Sitting EOB:  Independent  Dynamic Standing:  Supervision using [employer]     Pt is A & O x 3  Sensation:  Pt denies numbness and tingling to extremities  Edema:  unremarkbale    Patient education  Pt educated on role of PT intervention. Pt educated on safety in room with utilization of call light for assistance with mobility. Patient response to education:   Pt verbalized understanding Pt demonstrated skill Pt requires further education in this area   yes yes yes     ASSESSMENT:    Comments:  RN cleared pt for activity prior to session. Pt received supine in bed and agreeable to PT intervention with OT collaboration at this time. Pt performed all functional mobility as noted above. Pt appears mildly confused throughout session but follows all commands well. Pt required much encouragement to participate as well. Pt continues to be unsteady with ambulation requiring min/modA to complete ambulation in room. At end of session, pt transferred to bedside chair and left with all needs met and call light in reach. Pt requires continued skilled PT intervention for the purposes of maximizing functional mobility and independence. Treatment:  Patient practiced and was instructed in the following treatment:     Therapeutic Activities Completed:  o Functional mobility as noted above:   - Bed mobility: SBA with bed rail. Min VC and hand over hand guidance to facilitate efficient use of BUE on bed rail to promote more independent completion of task. - Transfer training: Jordan from EOB and chair. Min VC for proper hand placement and sequencing to complete. Stand pivot with front [employer] Jordan. Pt unsteady when turning and required max VC for proper sequencing when turning to sit with front [employer].    - Ambulation: 25 feet with front [employer] min/modA x 2 reps.   Multiple LOBs requiring assistance to correct.    o Skilled repositioning in seated position for comfort.  o Pt education as noted above.    PLAN:    Patient is making fair progress towards established goals. Will continue with current POC.       Time in  1117  Time out  1133    Total Treatment Time  8 minutes     CPT codes:  [] Gait training 45511 0 minutes  [] Manual therapy 16411 0 minutes  [x] Therapeutic activities 83792 8 minutes  [] Therapeutic exercises 78472 0 minutes  [] Neuromuscular reeducation 13911 0 minutes    Dian Dawkins, PT, DPT  [id number]

## 2021-02-28 NOTE — PROGRESS NOTES
Hospitalist Progress Note      PCP: Sobia Nassar DO    Date of Admission: 2/19/2021  Days in the hospital: [place] Course:   82 y. o. female history of dementia, GERD, hypertension, critical aortic stenosis who was sent from Dr. Cummins clinic for evaluation of presyncope symptoms.  The patient had recent admission in January 2021 for presyncope. Regine Negron was noted to have large pericardial effusion with tamponade physiology.  She had emergent pericardiocentesis with 1.4 liters removed by CT surgery.  Cytology suspicious for adenocarcinoma.  She was also noted to have critical AS.  Work-up for TAVR was to be started. Regine Negron was seen in cardiology clinic today for follow-up. Bethany Monroe states she has dyspnea on ambulation.  She has intermittent lightheadedness on standing.  She has dementia.  Some of the history is collaborated from her son Sachin Paul. In ER, BP was 108/60 on arrival.  Lab work is pertinent for BNP 7256, troponin <0.01.  CTA chest/abdomen/pelvis pending.  Emergent echocardiogram shows moderate circumferential pericardial effusion with no tamponade physiology     Patient underwent TAVR. She is being followed by oncology, surgery, oncology, palliative care. Subjective  Patient seen and examined at bedside. Denies SOB. States she did not get out of bed for breakfast, but she is hungry and wants some food. Nurse notified of patient's request.    Exam:    BP (!) 144/68   Pulse 54   Temp 98.8 °F (37.1 °C)   Resp 16   Ht 5' 8\" (1.727 m)   Wt 140 lb (63.5 kg)   SpO2 97%   BMI 21.29 kg/m²     Constitutional: laying in bed, NAD. frail  HEENT: No pallor, no icterus. Respiratory:  CTA, good air entry. Cardiovascular: RRR, no murmur. Abdomen: Soft, non-tender, BS noted. Musculoskeletal: No joint pains or joint swelling noted.    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      Labs:   Recent Labs     02/26/21  0434 02/27/21  0445 02/28/21  0423   WBC 7.6 9.5 8.2   HGB 11.0* 11.1* 11.0*   HCT 35.4 37.3 36.5    144 144     Recent Labs     02/26/21  0434 02/27/21  0445 02/28/21  0423    139 139   K 4.0 4.0 4.1    104 105   CO2 30* 30* 29   BUN 18 12 18   CREATININE 0.8 0.8 0.8   CALCIUM 8.6 8.7 8.7     No results for input(s): AST, ALT, BILIDIR, BILITOT, ALKPHOS in the last 72 hours. No results for input(s): INR in the last 72 hours. No results for input(s): Cydni Foster in the last 72 hours.     Medications:  Reviewed    Infusion Medications    insulin      dextrose       Scheduled Medications    metoprolol succinate  50 mg Oral Daily    sodium chloride flush  10 mL Intravenous 2 times per day    aspirin  81 mg Oral Daily    insulin glargine  0.15 Units/kg Subcutaneous Nightly    polyethylene glycol  17 g Oral Daily    docusate sodium  100 mg Oral BID    venlafaxine  75 mg Oral Daily    enoxaparin  40 mg Subcutaneous Daily     PRN Meds: perflutren lipid microspheres, sodium chloride flush, oxyCODONE-acetaminophen, glucose, dextrose, glucagon (rDNA), dextrose, perflutren lipid microspheres, acetaminophen, bisacodyl, promethazine **OR** ondansetron, acetaminophen **OR** acetaminophen, potassium chloride **OR** potassium alternative oral replacement **OR** potassium chloride, meclizine      Intake/Output Summary (Last 24 hours) at 2/28/2021 0926  Last data filed at 2/27/2021 1901  Gross per 24 hour   Intake 960 ml   Output 900 ml   Net 60 ml     Body mass index is 21.29 kg/m². · Diet  Dietary Nutrition Supplements: Clear Liquid Oral Supplement  DIET LOW FAT;    · Code Status  Full Code     Electronically signed by Doreen Kanner, DO [employer]   Please contact me through perfect serve    .

## 2021-02-28 NOTE — PROGRESS NOTES
[place]  Department of Pulmonary, Critical Care and [address] [facility]  Department of Internal Medicine  Progress Note    SUBJECTIVE:    No CP,   SOB improved  Tolerating current oxygen therapy   Reviewed overnight events with staff. Checked the monitors & laboratory tests. No complaints of pain at this time. OBJECTIVE:  Vitals:    02/27/21 0900 02/27/21 1523 02/27/21 2100 02/28/21 0430   BP: (!) 107/55 108/60 128/60 (!) 144/68   Pulse: 60 60 58 54   Resp: 16 16 18 16   Temp: 96.7 °F (35.9 °C) 97 °F (36.1 °C) 98.8 °F (37.1 °C)    TempSrc: Temporal Temporal     SpO2: 95% 95% 97%    Weight:    140 lb (63.5 kg)   Height:         Constitutional: Alert,     EENT: EOMI FERNANDEZ. MMM. No icterus. No thrush. Neck: No thyromegaly. No elevated JVP. Trachea was midline. Respiratory: Symmetrical.  Breath sounds were clear. Cardiovascular: Regular, No murmur. No rubs. Pulses:  Equal bilaterally. Abdomen: Soft without organomegaly. No rebound, rigidity. No guarding. Lymphatic: No lymphadenopathy. Musculoskeletal: Without weakness or gross deficits  Extremities:  No lower extremity edema. Reflexes appear adequate. Skin:  Warm and dry. No skin rashes. Neurological/Psychiatric: No acute psychosis. Cranial nerves are intact. DATA:    Monitor Strips:  Reviewed & discusses with technical team. No changes noted.     RADIOLOGY:  Films were read/reviewed/discussed with radiology shows resolved effusion      CBC:   Lab Results   Component Value Date    WBC 8.2 02/28/2021    RBC 4.10 02/28/2021    HGB 11.0 02/28/2021    HCT 36.5 02/28/2021    MCV 89.0 02/28/2021    MCH 26.8 02/28/2021    MCHC 30.1 02/28/2021    RDW 16.9 02/28/2021     02/28/2021    MPV 11.9 02/28/2021     CMP:    Lab Results   Component Value Date     02/28/2021    K 4.1 02/28/2021    K 4.3 02/23/2021     02/28/2021    CO2 29 02/28/2021    BUN 18

## 2021-02-28 NOTE — PROGRESS NOTES
OT BEDSIDE TREATMENT NOTE      Date:2021  Patient Name: Shin Johnson  MRN: [id number]  : 1935  Room: [address] [place] [address]     Per OT Eval:  Evaluating OT: Gene Darya, OTR/L #[id number]     AM-PAC Daily Activity Raw Score:      Recommended Adaptive Equipment/DME: To be further assessed       Diagnosis:    1. Critical aortic valve stenosis    2. Pericardial effusion          Pertinent Medical History:   Past Medical History        Past Medical History:   Diagnosis Date    Accidental drug overdose 2014    Acute delirium 2014    Anxiety      Arthritis      Dizziness, nonspecific      Generalized headaches      Heart murmur       stable per pt    Hyperlipidemia      Hypertension      Knee pain       right    Malnutrition ([address] 2014    Migraine headache      Pericardial effusion 2021    Polypharmacy 2014    Reflux gastritis      Rhabdomyolysis 2014    Sinus problem           Precautions:  Falls, bed alarm, cognition, blurred vision     Home Living:  Prior 2021 pt lived alone in a 2 story home. Pt is currently from [address] [place] [address]  Prior Level of Function:  Poor historian d/t confusion. Per pt she was indep  with ADLs , and   with IADLs; using ww for ambulation.      Pain Level: Pt did not complain of pain this session  Cognition: A&O: self, hospital   Follows 2 step directions: fair               Memory:  poor+              Sequencing:  poor+              Problem solving:  poor              Judgement/safety:  poor                Functional Assessment:    Initial Eval Status  Date: 21 Treatment Status  Date: 21 STG=LTG  Time frame: 5-7 days   Feeding Moderate Assist   Required set up and frequent cues for location of food and postioning of food in front of her. Poor problem solving SBA Supervision    Grooming Moderate Assist   To wash hands at sink. Poor problem solving /safety  Min A  Min A to wash hands standing at sink.  Set up to comb hair seated Stand by Assist    UB Dressing Minimal Assist   [facility] gown  min A  seated Supervision    LB Dressing Moderate Assist   To doff/greer brief    Mod A  To don pants seated EOB and standing to pull over hips  Supervision    Bathing Moderate Assist  Seated EOB  Poor safety and sequencing  min A  Simulated seated EOB Stand by Assist    Toileting Minimal/mod Assist   To standard commode        Min A  Min A for clothing management. SBA for hygiene Stand by Assist    Bed Mobility  Supine to sit: NT   Sit to supine: NT   SBA  Supine<>EOB  EOB<>Supine  Educated pt on technique to increase independence. Supine to sit: Supervision   Sit to supine: Supervision    Functional Transfers Sit to stand: Minimal Assist   Stand to sit: Minimal Assist   Stand pivot: Minimal Assist  Min A  Sit to Stand  Stand to Sit    Cueing for hand placement  Supervision    Functional Mobility Min A with ww  Cues for safety and walker management  min A  Home distance using w/w Supervision with ww   Balance Sitting:     Static:  supervision    Dynamic:SBA  Standing: min A  Sitting EOB:  SBA    Standing:  Min A Sitting:     Static:  indep    Dynamic:indep  Standing: supervision with ww   Activity Tolerance Poor+  Fair Good-   Visual/  Perceptual Glasses: yes . Pt appeared confused about the type of glasses she was wearing. Reading vs prescription.    Pt c/o blurred vision and could not identify colors or food items on tray.       Pt will be able to demonstrate ability to compensate for impaired vision using 75%      BUE  ROM/Strength/  Fine motor Coordination Hand dominance: R     RUE: ROM WFL     Strength: grossly 3+/5      Strength:  WFL     Coordination:  fair     LUE: ROM  WFL     Strength: grossly 3+/5      Strength:  WFL     Coordination: fair  Pt educated on and completed BUE AROM exercises all planes to complete throughout day to increase strength and endurance for increased independence with ADL's.        Hearing: WFL   Sensation:   No c/o numbness or tingling   Tone:  WFL   Edema: none noted    Comments: Upon arrival pt supine in bed. Pt educated on techniques to increase independence and safety during ADL's, bed mobility, and functional transfers. At end of session, pt left seated in bedside chair, all lines and tubes intact, call light within reach. · Pt has made fair progress towards set goals.    · Continue with current plan of care focusing on increasing of independency with transfers and ADL tasks      Treatment Time In: 10:55           Treatment Time Out: 11:15            Treatment Charges: Mins Units   Ther Ex  [id number]     Manual Therapy 83890     Thera Activities [id number]     ADL/Home Mgt 29083 20 1   Neuro Re-ed 45146     Group Therapy      Orthotic manage/training  [id number]     Non-Billable Time     Total Timed Treatment 20 2021 [place] [address]

## 2021-02-28 NOTE — PROGRESS NOTES
INPATIENT MEDICAL ONCOLOGY PROGRESS NOTE    SUBJECTIVE:    Afebrile. The patient denies any pain, shortness of breath had improved, she wants to go home. OBJECTIVE  VITALS:  BP (!) 104/52   Pulse 60   Temp 97.1 °F (36.2 °C) (Temporal)   Resp 16   Ht 5' 8\" (1.727 m)   Wt 140 lb (63.5 kg)   SpO2 95%   BMI 21.29 kg/m²   ENT:  oropharynx clear  NECK:  No  lymphadenopathy. HEMATOLOGIC/LYMPHATICS:  No abnormal lymphadenopathy. LUNGS: Are clear. CARDIOVASCULAR:  Regular rate and rhythm. No clicks, murmurs, rubs or gallops. ABDOMEN:  Soft, nontender. No ascites. No mass or organomegaly. NEUROLOGIC:  No focal deficits. Anxious   EXTREMITIES: without clubbing, cyanosis, or edema. DIAGNOSTIC DATA  Labs:    Lab Results   Component Value Date    WBC 8.2 02/28/2021    HGB 11.0 (L) 02/28/2021    HCT 36.5 02/28/2021    MCV 89.0 02/28/2021     02/28/2021     Lab Results   Component Value Date     02/28/2021    K 4.1 02/28/2021     02/28/2021    CO2 29 02/28/2021    BUN 18 02/28/2021    CREATININE 0.8 02/28/2021    GLUCOSE 100 (H) 02/28/2021    CALCIUM 8.7 02/28/2021    PROT 6.3 (L) 02/23/2021    LABALBU 3.6 02/23/2021    BILITOT 0.2 02/23/2021    ALKPHOS 57 02/23/2021    AST 17 02/23/2021    ALT 9 02/23/2021    LABGLOM >60 02/28/2021    GFRAA >60 02/28/2021     Lab Results   Component Value Date    CEA 7.0 (H) 02/21/2021     IMPRESSION:  Patient Active Problem List   Diagnosis    Anxiety    Migraine headache    Accidental drug overdose    Essential hypertension, benign    GERD (gastroesophageal reflux disease)    Dementia (HCC)    Polypharmacy    Malnutrition (HCC)    Acute delirium    Pericardial effusion    Pain syndrome, chronic    Critical aortic valve stenosis    Ampulla of Vater mass    Palliative care by specialist    Mod[facility] protein[facility]calorie malnutrition ([facility]     82 y. o. female history of dementia, GERD, hypertension, critical aortic stenosis who had recent admission in January 2021 for presyncope. Noted to have large pericardial effusion with tamponade physiology.    Emergent pericardiocentesis with 1.4 liters removed by CT surgery. Cytology suspicious for adenocarcinoma.       She was also noted to have critical AS. Cardiology team following     Emergent echocardiogram shows moderate circumferential pericardial effusion with no tamponade physiology.     CTA chest/abdomen/pelvis severe aortic calcifications  Moderate extrahepatic and central intrahepatic biliary ductal dilatation as well as moderate dilatation of the main pancreatic duct. Moderate left pleural effusion. Unchanged 1.1 cm ground glass opacity right lung apex    EUS findings:   Esophagus: normal              Stomach: normal              Duodenum: Mild fullness of the ampulla without convincing evidence for an ampullary mass. Evaluation under endoscopic ultrasound did not reveal any abnormality in the duodenal wall at the level of the ampulla. There is normal tapering of the common bile duct and main pancreatic duct to the ampulla. The region of the ampulla was biopsied using a 22-gauge FNA needle making 2 passes. Endoscopic forcep biopsies were also used to grasp 2 samples from the ampulla. Pancreas: 3 mm pancreatic duct in the pancreatic head which tapers down to 2 mm in the body and tail. Multiple subcentimeter cyst along the pancreatic duct consistent with IPMN's. Bile Duct: Dilated to 12 mm              Gallbladder: Surgically absent    PLAN:  -MRI abdomen-? ampullary neoplasm  -s/p EUS 2/26/2021 Ampulla biopsied-no ampullary mass is seen, pathology pending, 3 mm pancreatic duct in the pancreatic head which tapers down to 2 mm in the body and tail. Multiple subcentimeter cyst along the pancreatic duct consistent with IPMN's. Chromogranin A: (56) CEA (7), CA 19-9 (6), AFP (3)  -Cytology from pericardial fluid (2/24/2021) was negative for malignant cells.   -Monitor CBCD, currently stable 11/ 36.5 plt: 144K  -PET scan as OP.  -Will continue to follow      Holly Abraham MD   HEMATOLOGY/MEDICAL [address] [facility],  [address] [place] MED ONCOLOGY  [facility] [address] [facility] [address]  Dept: [phone]

## 2021-02-28 NOTE — PATIENT CARE CONFERENCE
P Quality Flow/Interdisciplinary Rounds Progress Note        Quality Flow Rounds held on February 28, 2021    Disciplines Attending:  Bedside Nurse, ,  and Nursing Unit Leadership    Pearl Schreiber was admitted on 2/19/2021  3:47 PM    Anticipated Discharge Date:  Expected Discharge Date: 02/23/21    Disposition:    Gerson Score:  Gerson Scale Score: 19    Readmission Risk              Risk of Unplanned Readmission:        17           Discussed patient goal for the day, patient clinical progression, and barriers to discharge.   The following Goal(s) of the Day/Commitment(s) have been identified:  discharge [address]  February 28, 2021

## 2021-02-28 NOTE — PROGRESS NOTES
[facility] SURGERY  DAILY PROGRESS NOTE  2/27/2021     CC: weightloss     Subjective:  Pt doing well. No pain, reports feeling down. She wants to go home.     Objective:  /64   Pulse 54   Temp 97.2 °F (36.2 °C) (Oral)   Resp 16   Ht 5' 8\" (1.727 m)   Wt 140 lb (63.5 kg)   SpO2 96%   BMI 21.29 kg/m²      GENERAL:  Laying in bed, awake, alert, cooperative, in no apparent distress  HEAD: Normocephalic  EYES: No sclera icterus  LUNGS:  No increased work of breathing  CARDIOVASCULAR:  Bradycardic and normotensive  ABDOMEN:  Soft, non- tender, non-distended  EXTREMITIES: No edema  SKIN: Warm     Assessment/Plan:  82 y.o. female  with intrahepatic and extrahepatic duct dilation, pancreatic duct dilation.  Cytology from pericardial effusion concerning for possible adenocarcinoma.     - Overall care per primary   - Tumor markers: Chromogrannin A 56, CEA (7), CA 19-9 (6), AFP (3)  - Pain and antiemetic control PRN  - EUS showed no ampullary mass  - F/U biopsies     Electronically signed by Diane Siddiqui MD     I saw and examined the patient. I reviewed the above resident's note. I agree with the assessment and plan as outlined.     Maya Reid MD  General Surgery

## 2021-03-01 LAB
ANION GAP SERPL CALCULATED.3IONS-SCNC: 7 MMOL/L (ref 7–16)
BUN BLDV-MCNC: 18 MG/DL (ref 8–23)
CALCIUM SERPL-MCNC: 8.4 MG/DL (ref 8.6–10.2)
CHLORIDE BLD-SCNC: 103 MMOL/L (ref 98–107)
CO2: 28 MMOL/L (ref 22–29)
CREAT SERPL-MCNC: 0.8 MG/DL (ref 0.5–1)
FUNGUS (MYCOLOGY) CULTURE: NORMAL
FUNGUS STAIN: NORMAL
GFR AFRICAN AMERICAN: >60
GFR NON-AFRICAN AMERICAN: >60 ML/MIN/1.73
GLUCOSE BLD-MCNC: 97 MG/DL (ref 74–99)
HCT VFR BLD CALC: 36.2 % (ref 34–48)
HEMOGLOBIN: 11.1 G/DL (ref 11.5–15.5)
MCH RBC QN AUTO: 27.3 PG (ref 26–35)
MCHC RBC AUTO-ENTMCNC: 30.7 % (ref 32–34.5)
MCV RBC AUTO: 88.9 FL (ref 80–99.9)
METER GLUCOSE: 153 MG/DL (ref 74–99)
PDW BLD-RTO: 17 FL (ref 11.5–15)
PLATELET # BLD: 162 E9/L (ref 130–450)
PMV BLD AUTO: 12.2 FL (ref 7–12)
POTASSIUM SERPL-SCNC: 4.1 MMOL/L (ref 3.5–5)
RBC # BLD: 4.07 E12/L (ref 3.5–5.5)
SODIUM BLD-SCNC: 138 MMOL/L (ref 132–146)
WBC # BLD: 8.9 E9/L (ref 4.5–11.5)

## 2021-03-01 PROCEDURE — 2580000003 HC RX 258: Performed by: PHYSICIAN ASSISTANT

## 2021-03-01 PROCEDURE — 6360000002 HC RX W HCPCS: Performed by: PHYSICIAN ASSISTANT

## 2021-03-01 PROCEDURE — 36415 COLL VENOUS BLD VENIPUNCTURE: CPT

## 2021-03-01 PROCEDURE — 82962 GLUCOSE BLOOD TEST: CPT

## 2021-03-01 PROCEDURE — 85027 COMPLETE CBC AUTOMATED: CPT

## 2021-03-01 PROCEDURE — 99233 SBSQ HOSP IP/OBS HIGH 50: CPT | Performed by: INTERNAL MEDICINE

## 2021-03-01 PROCEDURE — 2140000000 HC CCU INTERMEDIATE R&B

## 2021-03-01 PROCEDURE — 6370000000 HC RX 637 (ALT 250 FOR IP): Performed by: PHYSICIAN ASSISTANT

## 2021-03-01 PROCEDURE — 80048 BASIC METABOLIC PNL TOTAL CA: CPT

## 2021-03-01 RX ORDER — OXYCODONE HYDROCHLORIDE AND ACETAMINOPHEN 5; 325 MG/1; MG/1
1 TABLET ORAL EVERY 6 HOURS PRN
Qty: 20 TABLET | Refills: 0 | Status: SHIPPED | OUTPATIENT
Start: 2021-03-01 | End: 2021-03-06

## 2021-03-01 RX ADMIN — METOPROLOL SUCCINATE 50 MG: 50 TABLET, EXTENDED RELEASE ORAL at 09:38

## 2021-03-01 RX ADMIN — ASPIRIN 81 MG: 81 TABLET, COATED ORAL at 09:38

## 2021-03-01 RX ADMIN — VENLAFAXINE 75 MG: 75 TABLET ORAL at 09:38

## 2021-03-01 RX ADMIN — SODIUM CHLORIDE, PRESERVATIVE FREE 10 ML: 5 INJECTION INTRAVENOUS at 20:54

## 2021-03-01 RX ADMIN — DOCUSATE SODIUM 100 MG: 100 CAPSULE, LIQUID FILLED ORAL at 09:38

## 2021-03-01 RX ADMIN — ACETAMINOPHEN 650 MG: 325 TABLET ORAL at 18:08

## 2021-03-01 RX ADMIN — SODIUM CHLORIDE, PRESERVATIVE FREE 10 ML: 5 INJECTION INTRAVENOUS at 09:38

## 2021-03-01 RX ADMIN — OXYCODONE AND ACETAMINOPHEN 1 TABLET: 5; 325 TABLET ORAL at 06:36

## 2021-03-01 RX ADMIN — DOCUSATE SODIUM 100 MG: 100 CAPSULE, LIQUID FILLED ORAL at 20:54

## 2021-03-01 RX ADMIN — INSULIN GLARGINE 10 UNITS: 100 INJECTION, SOLUTION SUBCUTANEOUS at 20:55

## 2021-03-01 RX ADMIN — ENOXAPARIN SODIUM 40 MG: 40 INJECTION SUBCUTANEOUS at 09:38

## 2021-03-01 ASSESSMENT — PAIN SCALES - GENERAL
PAINLEVEL_OUTOF10: 0
PAINLEVEL_OUTOF10: 5

## 2021-03-01 NOTE — CARE COORDINATION
3/1, SW spoke with Jass Min from [facility] on patient and having precert started. Patient is a return back to facility but precert is needed. Ambulance, face sheet and envelope is on soft chart. SW to follow for further discharge planning needs.       SANDRA Monroy  [facility] Case Management  [phone]

## 2021-03-01 NOTE — PATIENT CARE CONFERENCE
[facility] Quality Flow/Interdisciplinary Rounds Progress Note        Quality Flow Rounds held on March 1, 2021    Disciplines Attending:  Bedside Nurse, ,  and Nursing Unit Leadership    Jayson Guerra was admitted on 2/19/2021  3:47 PM    Anticipated Discharge Date:  Expected Discharge Date: 02/23/21    Disposition:    Gerson Score:  Gerson Scale Score: 21    Readmission Risk              Risk of Unplanned Readmission:        19           Discussed patient goal for the day, patient clinical progression, and barriers to discharge.   The following Goal(s) of the Day/Commitment(s) have been identified:  discharge planning      Cammy Marley  March 1, 2021

## 2021-03-01 NOTE — PROGRESS NOTES
INPATIENT MEDICAL ONCOLOGY PROGRESS NOTE    SUBJECTIVE:    Afebrile. Patient seems less anxious today. D/C home today  Discussed PET scan needed as outpatient     OBJECTIVE  VITALS:  BP (!) 114/59   Pulse 58   Temp 97.5 °F (36.4 °C) (Oral)   Resp 18   Ht 5' 8\" (1.727 m)   Wt 140 lb (63.5 kg)   SpO2 96%   BMI 21.29 kg/m²   ENT:  oropharynx clear  NECK:  No  lymphadenopathy. HEMATOLOGIC/LYMPHATICS:  No abnormal lymphadenopathy. LUNGS: Are clear. CARDIOVASCULAR:  Regular rate and rhythm. No clicks, murmurs, rubs or gallops. ABDOMEN:  Soft, nontender. No ascites. No mass or organomegaly. NEUROLOGIC:  No focal deficits. Anxious   EXTREMITIES: without clubbing, cyanosis, or edema. DIAGNOSTIC DATA  Labs:    Lab Results   Component Value Date    WBC 8.9 03/01/2021    HGB 11.1 (L) 03/01/2021    HCT 36.2 03/01/2021    MCV 88.9 03/01/2021     03/01/2021     Lab Results   Component Value Date     03/01/2021    K 4.1 03/01/2021     03/01/2021    CO2 28 03/01/2021    BUN 18 03/01/2021    CREATININE 0.8 03/01/2021    GLUCOSE 97 03/01/2021    CALCIUM 8.4 (L) 03/01/2021    PROT 6.3 (L) 02/23/2021    LABALBU 3.6 02/23/2021    BILITOT 0.2 02/23/2021    ALKPHOS 57 02/23/2021    AST 17 02/23/2021    ALT 9 02/23/2021    LABGLOM >60 03/01/2021    GFRAA >60 03/01/2021     Lab Results   Component Value Date    CEA 7.0 (H) 02/21/2021     IMPRESSION:  Patient Active Problem List   Diagnosis    Anxiety    Migraine headache    Accidental drug overdose    Essential hypertension, benign    GERD (gastroesophageal reflux disease)    Dementia (HCC)    Polypharmacy    Malnutrition (HCC)    Acute delirium    Pericardial effusion    Pain syndrome, chronic    Critical aortic valve stenosis    Ampulla of Vater mass    Palliative care by specialist    Moderate protein-calorie malnutrition ([facility]     82 y. o. female history of dementia, GERD, hypertension, critical aortic stenosis who had recent admission in January 2021 for presyncope. Noted to have large pericardial effusion with tamponade physiology.    Emergent pericardiocentesis with 1.4 liters removed by CT surgery. Cytology suspicious for adenocarcinoma.       She was also noted to have critical AS. Cardiology team following     Emergent echocardiogram shows moderate circumferential pericardial effusion with no tamponade physiology.     CTA chest/abdomen/pelvis severe aortic calcifications  Moderate extrahepatic and central intrahepatic biliary ductal dilatation as well as moderate dilatation of the main pancreatic duct. Moderate left pleural effusion. Unchanged 1.1 cm ground glass opacity right lung apex    EUS findings:   Esophagus: normal  Stomach: normal  Duodenum: Mild fullness of the ampulla without convincing evidence for an ampullary mass. Evaluation under endoscopic ultrasound did not reveal any abnormality in the duodenal wall at the level of the ampulla. There is normal tapering of the common bile duct and main pancreatic duct to the ampulla. The region of the ampulla was biopsied using a 22-gauge FNA needle making 2 passes. Endoscopic forcep biopsies were also used to grasp 2 samples from the ampulla. Pancreas: 3 mm pancreatic duct in the pancreatic head which tapers down to 2 mm in the body and tail. Multiple subcentimeter cyst along the pancreatic duct consistent with IPMN's. Bile Duct: Dilated to 12 mm  Gallbladder: Surgically absent    PLAN:  MRI abdomen-? ampullary neoplasm  s/p EUS 2/26/2021 Ampulla biopsied-no ampullary mass is seen, pathology pending, 3 mm pancreatic duct in the pancreatic head which tapers down to 2 mm in the body and tail. Multiple subcentimeter cyst along the pancreatic duct consistent with IPMN's. Chromogranin A: (56) CEA (7), CA 19-9 (6), AFP (3)  Cytology from pericardial fluid (2/24/2021) was negative for malignant cells. Monitor CBCD, currently stable 11.1/36.2   PET scan as OP.   Will

## 2021-03-01 NOTE — PROGRESS NOTES
Hospitalist Progress Note      PCP: Waldo Hampton DO    Date of Admission: 2/19/2021  Days in the hospital: [facility] HOSP Course:   82 y. o. female history of dementia, GERD, hypertension, critical aortic stenosis who was sent from Dr. Cummins clinic for evaluation of presyncope symptoms.  The patient had recent admission in January 2021 for presyncope. Christy Braun was noted to have large pericardial effusion with tamponade physiology.  She had emergent pericardiocentesis with 1.4 liters removed by CT surgery.  Cytology suspicious for adenocarcinoma.  She was also noted to have critical AS.  Work-up for TAVR was to be started. Christy Braun was seen in cardiology clinic today for follow-up. Keren Welch states she has dyspnea on ambulation.  She has intermittent lightheadedness on standing.  She has dementia.  Some of the history is collaborated from her son Noel Campbell. In ER, BP was 108/60 on arrival.  Lab work is pertinent for BNP 7256, troponin <0.01.  CTA chest/abdomen/pelvis pending.  Emergent echocardiogram shows moderate circumferential pericardial effusion with no tamponade physiology     Patient underwent TAVR. She is being followed by oncology, surgery, oncology, palliative care. Subjective  Patient seen and examined at bedside. 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 Respiratory:  CTA, good air entry. Cardiovascular: RRR, no murmur. Abdomen: Soft, non-tender, BS noted. Musculoskeletal: No joint pains or joint swelling noted.    Neurologic: awake, alert and following commands   Psychiatric: somewhat depressed, notanxious      Assessment/Plan:  Active Hospital Problems    Diagnosis Date Noted    Moderate protein-calorie malnutrition ([facility] [E44.0] 02/26/2021    Palliative care by specialist [Z51.5]     Ampulla of Vater mass [K83.8]     Critical aortic valve stenosis [I35.0] 01/29/2021     · Large pericardial effusion  · Intrahepatic and extrahepatic biliary duct dilatation  · Large left pleural effusion    Plan:  · Status post TAVR, cardio recs  · S/p pericardial effusion drain removal  · Pleural effusion mostly resolved on imaging  · Oncology following - Cytology NEG for malignant cells - gastric epithelium  · Plan for outpatient PET; MRI abdomen-? ampullary neoplasm  · No ampullary mass seen on EUS  · Continue with rest of her medications  · Waiting precert to be able to d/c to SNF  · Discussed with bedside RN      Labs:   Recent Labs     02/27/21 0445 02/28/21  0423 03/01/21  0526   WBC 9.5 8.2 8.9   HGB 11.1* 11.0* 11.1*   HCT 37.3 36.5 36.2    144 162     Recent Labs     02/27/21 0445 02/28/21  0423 03/01/21  0526    139 138   K 4.0 4.1 4.1    105 103   CO2 30* 29 28   BUN 12 18 18   CREATININE 0.8 0.8 0.8   CALCIUM 8.7 8.7 8.4*     No results for input(s): AST, ALT, BILIDIR, BILITOT, ALKPHOS in the last 72 hours. No results for input(s): INR in the last 72 hours. No results for input(s): Fela Mccarty in the last 72 hours.     Medications:  Reviewed    Infusion Medications    insulin      dextrose       Scheduled Medications    metoprolol succinate  50 mg Oral Daily    sodium chloride flush  10 mL Intravenous 2 times per day    aspirin  81 mg Oral Daily    insulin glargine  0.15 Units/kg Subcutaneous Nightly    polyethylene glycol  17 g Oral Daily    docusate sodium  100 mg Oral BID    venlafaxine  75 mg Oral Daily    enoxaparin  40 mg Subcutaneous Daily     PRN Meds: perflutren lipid microspheres, sodium chloride flush, oxyCODONE-acetaminophen, glucose, dextrose, glucagon (rDNA), dextrose, perflutren lipid microspheres, acetaminophen, bisacodyl, promethazine **OR** ondansetron, acetaminophen **OR** acetaminophen, potassium chloride **OR** potassium alternative oral replacement **OR** potassium chloride, meclizine    No intake or output data in the 24 hours ending 03/01/21 0943  Body mass index is 21.29 kg/m². · Diet  Dietary Nutrition Supplements: Clear Liquid Oral Supplement  DIET LOW FAT;    · Code Status  Full Code     Electronically signed by Mukesh Oconnor MD   [employer]   Please contact me through perfect serve    .

## 2021-03-02 ENCOUNTER — TELEPHONE (OUTPATIENT)
Dept: CARDIOLOGY CLINIC | Age: 86
End: 2021-03-02

## 2021-03-02 VITALS
OXYGEN SATURATION: 98 % | WEIGHT: 140 LBS | DIASTOLIC BLOOD PRESSURE: 57 MMHG | BODY MASS INDEX: 21.22 KG/M2 | RESPIRATION RATE: 18 BRPM | HEART RATE: 65 BPM | SYSTOLIC BLOOD PRESSURE: 113 MMHG | TEMPERATURE: 98 F | HEIGHT: 68 IN

## 2021-03-02 DIAGNOSIS — I35.0 NONRHEUMATIC AORTIC VALVE STENOSIS: ICD-10-CM

## 2021-03-02 DIAGNOSIS — I31.39 PERICARDIAL EFFUSION: Primary | ICD-10-CM

## 2021-03-02 LAB
ANION GAP SERPL CALCULATED.3IONS-SCNC: 7 MMOL/L (ref 7–16)
BUN BLDV-MCNC: 20 MG/DL (ref 8–23)
CALCIUM SERPL-MCNC: 8.8 MG/DL (ref 8.6–10.2)
CHLORIDE BLD-SCNC: 102 MMOL/L (ref 98–107)
CO2: 30 MMOL/L (ref 22–29)
CREAT SERPL-MCNC: 0.9 MG/DL (ref 0.5–1)
GFR AFRICAN AMERICAN: >60
GFR NON-AFRICAN AMERICAN: 60 ML/MIN/1.73
GLUCOSE BLD-MCNC: 97 MG/DL (ref 74–99)
HCT VFR BLD CALC: 34.2 % (ref 34–48)
HEMOGLOBIN: 10.8 G/DL (ref 11.5–15.5)
MCH RBC QN AUTO: 27.8 PG (ref 26–35)
MCHC RBC AUTO-ENTMCNC: 31.6 % (ref 32–34.5)
MCV RBC AUTO: 87.9 FL (ref 80–99.9)
PDW BLD-RTO: 16.8 FL (ref 11.5–15)
PLATELET # BLD: 154 E9/L (ref 130–450)
PMV BLD AUTO: 11.7 FL (ref 7–12)
POTASSIUM SERPL-SCNC: 4 MMOL/L (ref 3.5–5)
RBC # BLD: 3.89 E12/L (ref 3.5–5.5)
SODIUM BLD-SCNC: 139 MMOL/L (ref 132–146)
WBC # BLD: 9.1 E9/L (ref 4.5–11.5)

## 2021-03-02 PROCEDURE — 36415 COLL VENOUS BLD VENIPUNCTURE: CPT

## 2021-03-02 PROCEDURE — 6370000000 HC RX 637 (ALT 250 FOR IP): Performed by: PHYSICIAN ASSISTANT

## 2021-03-02 PROCEDURE — 97530 THERAPEUTIC ACTIVITIES: CPT

## 2021-03-02 PROCEDURE — 80048 BASIC METABOLIC PNL TOTAL CA: CPT

## 2021-03-02 PROCEDURE — 6360000002 HC RX W HCPCS: Performed by: PHYSICIAN ASSISTANT

## 2021-03-02 PROCEDURE — 2580000003 HC RX 258: Performed by: PHYSICIAN ASSISTANT

## 2021-03-02 PROCEDURE — 85027 COMPLETE CBC AUTOMATED: CPT

## 2021-03-02 PROCEDURE — 99233 SBSQ HOSP IP/OBS HIGH 50: CPT | Performed by: INTERNAL MEDICINE

## 2021-03-02 RX ORDER — METOPROLOL SUCCINATE 50 MG/1
50 TABLET, EXTENDED RELEASE ORAL DAILY
Qty: 30 TABLET | Refills: 3 | DISCHARGE
Start: 2021-03-03 | End: 2021-10-12

## 2021-03-02 RX ORDER — ASPIRIN 81 MG/1
81 TABLET ORAL DAILY
Qty: 30 TABLET | Refills: 3 | DISCHARGE
Start: 2021-03-03

## 2021-03-02 RX ADMIN — ENOXAPARIN SODIUM 40 MG: 40 INJECTION SUBCUTANEOUS at 09:48

## 2021-03-02 RX ADMIN — METOPROLOL SUCCINATE 50 MG: 50 TABLET, EXTENDED RELEASE ORAL at 09:49

## 2021-03-02 RX ADMIN — DOCUSATE SODIUM 100 MG: 100 CAPSULE, LIQUID FILLED ORAL at 09:49

## 2021-03-02 RX ADMIN — VENLAFAXINE 75 MG: 75 TABLET ORAL at 09:49

## 2021-03-02 RX ADMIN — SODIUM CHLORIDE, PRESERVATIVE FREE 10 ML: 5 INJECTION INTRAVENOUS at 09:49

## 2021-03-02 RX ADMIN — ASPIRIN 81 MG: 81 TABLET, COATED ORAL at 09:49

## 2021-03-02 RX ADMIN — POLYETHYLENE GLYCOL 3350 17 G: 17 POWDER, FOR SOLUTION ORAL at 09:49

## 2021-03-02 NOTE — DISCHARGE SUMMARY
Hospitalist Discharge Summary    Patient ID: Cierra Ambrocio   Patient : 1935  Patient's PCP: Sharda Burt DO    Admit Date: 2021   Admitting Physician: No admitting provider for patient encounter. Discharge Date:  3/2/2021   Discharge Physician: Bong Roberts MD   Discharge Condition: Stable  Discharge Disposition: [address] course in brief:  (Please refer to daily progress notes for a comprehensive review of the hospitalization by requesting medical records)    Briefly this is a 82-year-old female with dementia, hypertension, critical aortic stenosis who presented for presyncope symptoms from cardiology clinic. Noted to have large pericardial effusion with tamponade physiology. Emergent pericardiocentesis was performed by CT surgery. Patient had a TAVR on . Pericardiocentesis fluid cytology was suspicious for adenocarcinoma. Patient was evaluated by oncology. 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 Patient had an endoscopic ultrasound on  with biopsies are currently pending. This needs followed up as outpatient.         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 [place]       Continued appropriate risk factor modification of blood pressure, diabetes and serum lipids will remain essential to reducing risk of future atherosclerotic development    Activity: activity as tolerated    Significant labs:  CBC:   Recent Labs     02/28/21 0423 03/01/21  0526 03/02/21  0404   WBC 8.2 8.9 9.1   RBC 4.10 4.07 3.89   HGB 11.0* 11.1* 10.8*   HCT 36.5 36.2 34.2   MCV 89.0 88.9 87.9   RDW 16.9* 17.0* 16.8*    162 154     BMP:   Recent Labs     02/28/21 0423 03/01/21  0526 03/02/21  0404    138 139   K 4.1 4.1 4.0    103 102   CO2 29 28 30*   BUN 18 18 20   CREATININE 0.8 0.8 0.9     LFT:  No results for input(s): PROT, ALB, ALKPHOS, ALT, AST, BILITOT, AMYLASE, LIPASE in the last 72 hours. PT/INR: No results for input(s): INR, APTT in the last 72 hours. BNP: No results for input(s): BNP in the last 72 hours.   Hgb A1C:   Lab Results   Component Value Date    LABA1C 5.0 02/23/2021     Folate and B12:   Lab Results   Component Value Date    AEHYESVR57 292 12/26/2014   ,   Lab Results   Component Value Date    FOLATE 16.5 12/26/2014     Thyroid Studies:   Lab Results   Component Value Date    TSH 1.770 01/25/2021    F8MCVBW 5.0 12/25/2014       Urinalysis:    Lab Results   Component Value Date    NITRU Negative 02/23/2021    WBCUA 5-10 02/23/2021    WBCUA 0-1 06/24/2011    BACTERIA RARE 02/23/2021    RBCUA 0-1 02/23/2021    RBCUA NONE 06/24/2011    BLOODU TRACE-INTACT 02/23/2021    SPECGRAV 1.010 02/23/2021    GLUCOSEU Negative 02/23/2021    GLUCOSEU 100 06/24/2011       Imaging:  Echo Limited    Result Date: 2/26/2021  Transthoracic Echocardiography Report (TTE)  Demographics   Patient Name    [address] [place] [address]        Gender            Female                  Tammy Grubbs   Medical Record  [id number]      Room Number       6217  Number   Account #       [de-identified]     Procedure Date    02/26/2021   Corporate ID                  Helene Stinson MD Physician   Accession       [id number]    Referring  Number                        Physician   Date of Birth   1935    Sonographer       Margaret Wahl [facility]   Age             82 year(s)    Interpreting      [address]                                Physician         Physician Cardiology                                                  Lola Ashley MD                                 Any Other  Procedure Type of Study   TTE procedure:Echo Limited Study. Procedure Date Date: 02/26/2021 Start: 12:44 PM Study Location: Portable Technical Quality: Adequate visualization Indications:Pericardial effusion. Patient Status: ASAP Height: 69 inches Weight: 147 pounds BSA: 1.81 m^2 BMI: 21.71 kg/m^2 Allergies   - Iodine. Findings   Left Ventricle  Normal left ventricle size. Moderate left ventricular concentric hypertrophy noted. Estimated left ventricle ejection fraction 65+/-5 %. Right Ventricle  Normal right ventricular size and function. Pericardial Effusion  Small residual pericardial effusion, appears slightly less than on  yesterday's study. Pleural Effusion  Large left pleural effusion. Conclusions   Summary  **Limited study for pericardial effusion - pigtail clamped for 24 hrs**  Normal left ventricle size. Estimated left ventricle ejection fraction  65+/-5 %. Normal right ventricular size and function. Small residual pericardial effusion, appears slightly less than on  yesterday's study. Large left pleural effusion. Signature   ----------------------------------------------------------------  Electronically signed by Lola Ashley MD(Interpreting physician)  on 02/26/2021 01:44 PM  ----------------------------------------------------------------  [email or web address] DocKey=[id number]%3d%3d    Echo Limited    Result Date: 2/25/2021  Transthoracic Echocardiography Report (TTE)  Demographics Patient Name       Cyndi Bauer  Gender               Female                     A   Medical Record     [id number]        Room Number          4410  Number   Account #          [de-identified]       Procedure Date       02/25/2021   Corporate ID                       Ordering Physician   Aiyana Marie MD   Accession Number   [id number]      Referring Physician   Date of Birth      1935      Sonographer          Manish Garibay [facility]   Age                82 year(s)      Interpreting         Aiyana Marie MD                                     Physician                                      Any Other  Procedure Type of Study   TTE procedure:Echo Limited Study. Procedure Date Date: 02/25/2021 Start: 07:41 AM Study Location: Portable Technical Quality: Adequate visualization Indications:S/P TAVR. Patient Status: Routine Height: 69 inches Weight: 147 pounds BSA: 1.81 m^2 BMI: 21.71 kg/m^2 BP: 127/61 mmHg Allergies   - Iodine. Findings   Left Ventricle  Normal left ventricle size. Estimated left ventricle ejection fraction >75% %. Moderate left ventricular concentric hypertrophy noted. An intracavitary peak gradient of 14mmHg is noted in the LV. Right Ventricle  Normal right ventricular size and function. Left Atrium  Left atrium is enlarged. Right Atrium  Normal right atrium size. Mitral Valve  Physiologic and/or trace mitral regurgitation is present. Tricuspid Valve  RVSP is 35 mmHg. Mild tricuspid regurgitation. Aortic Valve  There is a well-seated transcatheter valve in the aortic position. No  central or paravalvular regurgitation. MG 26, DVI 0.56, EOAi 0.9. There is  a hyperdynamic LV systolic function with an intracavitary gradient of  14mmHg. Pulmonic Valve  The pulmonic valve was not well visualized. Pericardial Effusion  Small pericardial effusion without signs of tamponade. Pleural Effusion  Large left pleural effusion. Aorta  Aorta was not clearly visualized.   Miscellaneous  Normal Inferior Vena Cava diameter and respiratory variation. Conclusions   Summary  **POD1 s/p TAVR with 23-mm CHAN 3 Ultra**  Normal left ventricle size. Estimated left ventricle ejection fraction  >75%. Moderate left ventricular concentric hypertrophy noted. Normal right ventricular size and function. There is a well-seated transcatheter valve in the aortic position. No  central or paravalvular regurgitation. MG 26, DVI 0.56, EOAi 0.9. There is  a hyperdynamic LV systolic function with an intracavitary gradient of  14mmHg. Signature   ----------------------------------------------------------------  Electronically signed by Venkatesh Arguelles MD(Interpreting physician)  on 02/25/2021 04:29 PM  ----------------------------------------------------------------  M-Mode/2D Measurements & Calculations                AV Cusp Separation: 1.2 cmAO Root Dimension: 3.2 cm    LVOT: 2 cm  Doppler Measurements & Calculations   MV Peak E-Wave:     AV Peak Velocity: 3.68 LVOT Peak Velocity: 1.58 m/s  1.03 m/s            m/s                    LVOT Mean Velocity: 1.21 m/s  MV Peak A-Wave:     AV Peak Gradient:      LVOT Peak Gradient: 9.9  1.45 m/s            54.14 mmHg             mmHgLVOT Mean Gradient: 6.5  MV E/A Ratio: 0.71  AV Mean Velocity: 2.58 mmHg                      m/s                      AV Mean Gradient: 30.4  MV Deceleration     mmHg  Time: 343.8 msec    AV VTI: 70.2 cm        TR Velocity:2.82 m/s                      AV Area                TR Gradient:31.85 mmHg                      (Continuity):1.58 cm^2                       LVOT VTI: 35.3 cm  [email or web address] DocKey=[id number]%3d%3d    Echo Limited    Result Date: 2/24/2021  Transthoracic Echocardiography Report (TTE)  Demographics   Patient Name      Jazmine Main Gender              Female                    A   Medical Record    [id number]       Room Number         [place]  Number ----------------------------------------------------------------  Electronically signed by Lui Gayle MD(Interpreting  physician) on 02/24/2021 03:57 PM  ----------------------------------------------------------------  M-Mode/2D Measurements & Calculations   CO: 5.62 l/min  CI: 3.1 l/m*m^2                                               LVOT: 2 cm  Doppler Measurements & Calculations    AV Peak Velocity: 2 m/s      LVOT Peak Velocity: 1.05 m/s   AV Peak Gradient: 16 mmHg    LVOT Mean Velocity: 0.76 m/s   AV Mean Velocity: 1.4 m/s    LVOT Peak Gradient: 4.4 mmHgLVOT Mean   AV Mean Gradient: 8.8 mmHg   Gradient: 2.6 mmHg   AV VTI: 43.9 cm   AV Area (Continuity):2.25   cm^2   AV Deceleration Time: 2074. 1 TR Velocity:2.5 m/s   msec                         TR Gradient:25.02 mmHg   LVOT VTI: 31.4 cm  [email or web address] DocKey=[id number]%3d%3d    Echo Limited    Result Date: 2/19/2021  Transthoracic Echocardiography Report (TTE)  Demographics   Patient Name      Cayla Anton Gender              Female                    A   Medical Record    [id number]       Room Number         10  Number   Account #         [de-identified]      Procedure Date      02/19/2021   Corporate ID                     Ordering Physician  Michelle Bowden MD   Accession Number  [id number]     Referring Physician   Date of Birth     1935     Sonographer         Chel Barron RDCS   Age               82 year(s)     Interpreting        Michelle Bowden MD                                   Physician                                    Any Other  Procedure Type of Study   TTE procedure:Echo Limited Study. Procedure Date Date: 02/19/2021 Start: 04:04 PM Study Location: ER Technical Quality: Good visualization Indications:Pericardial effusion.  Patient Status: STAT Height: 69 inches Weight: 147 pounds BSA: 1.81 m^2 BMI: 21.71 kg/m^2  Findings   Left Ventricle  Normal left ventricle size. Estimated left ventricle ejection fraction 65+/-5 %. Right Ventricle  Normal right ventricular size and function. Aortic Valve  The aortic valve appears severely sclerotic. Pericardial Effusion  Moderate circumferential pericardial effusion. There is no RA/RV collapse  to suggest tamponade. IVC is normal size. There is a fibrinous echodensity  overlying the epicardium. Miscellaneous  Normal Inferior Vena Cava diameter and respiratory variation. Conclusions   Summary  **Limited study for pericardial effusion**  Normal left ventricle size. Estimated left ventricle ejection fraction  65+/-5 %. Normal right ventricular size and function. Moderate circumferential pericardial effusion. There is no RA/RV collapse  to suggest tamponade. IVC is normal size. Signature   ----------------------------------------------------------------  Electronically signed by Julian Webb MD(Interpreting physician)  on 02/19/2021 04:41 PM  ----------------------------------------------------------------  [email or web address] DocKey=[id number]%3d%3d    Xr Chest Portable    Result Date: 2/27/2021  EXAMINATION: ONE XRAY VIEW OF THE CHEST 2/27/2021 8:33 am COMPARISON: February 23, 2021 HISTORY: ORDERING SYSTEM PROVIDED HISTORY: effusion, CT TECHNOLOGIST PROVIDED HISTORY: Tomorrow am Reason for exam:->effusion, CT What reading provider will be dictating this exam?->CRC FINDINGS: Minimal opacities at left costophrenic sulcus. Moderate size hiatal hernia. The heart is normal in size. Evidence of aortic valvuloplasty. No pneumothorax. Improved aeration in left lung base. Minimal opacities persist in left costophrenic sulcus.     Xr Chest Portable    Result Date: 2/23/2021  EXAMINATION: ONE XRAY VIEW OF THE CHEST 2/23/2021 2:52 pm COMPARISON: 02/19/2021 HISTORY: ORDERING SYSTEM PROVIDED HISTORY: Pre-op TECHNOLOGIST PROVIDED HISTORY: Please obtain portable CXR ONLY if patient unable to get PA and lateral Reason for exam:->Pre-op What reading provider will be dictating this exam?->CRC FINDINGS: Decreased left pleural effusion with a persistent small effusion with associated adjacent left basilar opacities. No pneumothorax. Unchanged cardiomegaly. No acute bony findings. Persistent but improved left pleural effusion with left basilar opacities. Unchanged cardiomegaly. Xr Chest Portable    Result Date: 2/19/2021  EXAMINATION: ONE XRAY VIEW OF THE CHEST 2/19/2021 1:34 pm COMPARISON: One-view chest x-ray 01/25/2021. CTA chest 02/19/2021. HISTORY: ORDERING SYSTEM PROVIDED HISTORY: Shortness of breath TECHNOLOGIST PROVIDED HISTORY: Reason for exam:->Shortness of breath What reading provider will be dictating this exam?->CRC FINDINGS: There is moderate enlargement of the cardiac silhouette, which appears similar to mildly increased compared to the previous x-ray exam and may be exaggerated by AP technique. The mediastinal contours are within normal limits. There is evidence of aortic atherosclerosis. Apical and basilar interstitial thickening is similar to the previous x-ray exam, suggesting scarring. There is mild left basilar atelectasis. Mild-moderate left pleural effusion appears similar to slightly decreased compared to the previous chest x-ray, status post interval removal of the previously noted left chest tube. No significant right pleural effusion is visualized. Fusion hardware is partially imaged in the lumbar spine. EKG leads overlie the chest.    Enlargement of the cardiac silhouette and left pleural effusion are overall similar to the previous chest x-ray from 01/25/2021, status post interval removal of the left chest tube.     Mri Abdomen W Wo Contrast Mrcp    Result Date: 2/21/2021  EXAMINATION: MRI OF THE ABDOMEN WITH AND WITHOUT CONTRAST AND MRCP 2/21/2021 7:53 am TECHNIQUE: Multiplanar multisequence MRI of the abdomen was performed with and without the administration of intravenous contrast.  After initial T2 axial and coronal images, thick slab, thin slab and 3D coronal MRCP sequences were obtained without the administration of intravenous contrast.  MIP images are provided for review. COMPARISON: 02/19/2021 HISTORY: ORDERING SYSTEM PROVIDED HISTORY: Dilated intrahepatic and extrahepatic ducts TECHNOLOGIST PROVIDED HISTORY: Reason for exam:->Dilated intrahepatic and extrahepatic ducts What reading provider will be dictating this exam?->CRC FINDINGS: Lower Chest: Moderate left pleural effusion. Small pericardial effusion. Organs: Liver is normal in contour and enhancement. Gallbladder is surgically absent. Moderate to severe extrahepatic and mild diffuse intrahepatic biliary ductal dilatation is again demonstrated. No obstructing stone is seen. Few cystic pancreatic lesions measuring up to 9 mm. Adrenals are unremarkable. Spleen is normal in size. 1.5 cm complicated cystic lesion of the lower pole of the right kidney with apparent enhancing septations. Moderate calcific atherosclerosis. GI/Bowel: Moderate hiatal hernia. Bowel is non-dilated without wall thickening. Peritoneum/Retroperitoneum:No free fluid, free air, organized fluid collection or lymphadenopathy. Bones: Multilevel degenerative disc disease. 1. Moderate to severe extrahepatic and mild diffuse intrahepatic biliary ductal dilatation is again demonstrated without obstructing stone seen. This is accompanied by mild pancreatic ductal dilatation. There is apparent fullness of the ampulla which does raise the possibility of a small ampullary mass. 2. Moderate left pleural effusion. 3. Small pericardial effusion. 4. Complicated cystic lesion of the lower pole of the right kidney measuring 1.5 cm, Bosniak 3 equivalent.   If not intervened upon, recommend follow-up renal mass protocol MRI in 6 months. 5. 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 Recommend follow-up pancreas protocol MRI in 2 years. Cta Chest W Contrast    Result Date: 2/19/2021  EXAMINATION: CTA OF THE CHEST; CTA OF THE ABDOMEN AND PELVIS WITH CONTRAST 2/19/2021 3:05 pm TECHNIQUE: Dose modulation, iterative reconstruction, and/or weight based adjustment of the mA/kV was utilized to reduce the radiation dose to as low as reasonably achievable.; CTA of the chest was performed after the administration of intravenous contrast.  Multiplanar reformatted images are provided for review. MIP images are provided for review. Dose modulation, iterative reconstruction, and/or weight based adjustment of the mA/kV was utilized to reduce the radiation dose to as low as reasonably achievable.; CTA of the abdomen and pelvis was performed with the administration of intravenous contrast. Multiplanar reformatted images are provided for review. MIP images are provided for review. Dose modulation, iterative reconstruction, and/or weight based adjustment of the mA/kV was utilized to reduce the radiation dose to as low as reasonably achievable. COMPARISON: 01/25/2021 HISTORY: ORDERING SYSTEM PROVIDED HISTORY: Nodular calcific aortic valve stenosis TECHNOLOGIST PROVIDED HISTORY: Please include aortic valve calcium score What reading provider will be dictating this exam?->CRC; ORDERING SYSTEM PROVIDED HISTORY: Nodular calcific aortic valve stenosis TECHNOLOGIST PROVIDED HISTORY: TAVR Please include aortic valve calcium score Reason for exam:->severe aortic stenosis What reading provider will be dictating this exam?->CRC FINDINGS: Chest: Mediastinum: Moderate coronary artery calcification. No mediastinal, hilar, or axillary lymphadenopathy. Small pericardial effusion. Severe aortic valvular calcification. Ascending thoracic aorta measures 3.7 cm at the level of the mid ascending thoracic aorta.  Lungs/pleura: Unchanged 1.1 cm ground-glass opacity of the right lung apex on series 8, image 39. Moderate left pleural effusion. Mild emphysema. Soft Tissues/Bones: Diffuse osteopenia with multilevel degenerative disc disease of the thoracolumbar spine. Abdomen/Pelvis: Organs: Liver is normal in contour and enhancement. Gallbladder is surgically absent. There is moderate extrahepatic and central intrahepatic biliary ductal dilatation as well as moderate dilatation of the main pancreatic duct. No obstructing mass or stone is seen. Adrenals are unremarkable. Spleen is normal in size. No renal calculi or hydronephrosis. Moderate calcific atherosclerosis. No significant iliac stenosis. GI/Bowel: Large volume stool throughout the colon. Bowel is non-dilated without wall thickening. Appendix is not seen though there are no secondary signs of appendicitis. Pelvis: Unremarkable. Peritoneum/Retroperitoneum:No free fluid, free air, organized fluid collection or lymphadenopathy. Bones: Diffuse osteopenia with multilevel degenerative disc disease. 1. Severe aortic valvular calcification. Ascending thoracic aorta measures 3.6 cm. No significant iliac vessel stenosis. 2. Moderate extrahepatic and central intrahepatic biliary ductal dilatation as well as moderate dilatation of the main pancreatic duct. No stone or mass is seen by CT. Consider further evaluation with nonemergent pancreas protocol MRI. 3. Moderate left pleural effusion. 4. Unchanged 1.1 cm ground-glass opacity of the right lung apex. Recommend follow-up chest CT in 6-12 months per Fleischner criteria. 5. Large volume stool throughout the colon.     Cta Abdomen Pelvis W Contrast    Result Date: 2/19/2021  EXAMINATION: CTA OF THE CHEST; CTA OF THE ABDOMEN AND PELVIS WITH CONTRAST 2/19/2021 3:05 pm TECHNIQUE: Dose modulation, iterative reconstruction, and/or weight based adjustment of the mA/kV was utilized to reduce the radiation dose to as low as reasonably achievable.; CTA of the chest was performed after the administration of intravenous contrast.  Multiplanar reformatted images are provided for review. MIP images are provided for review. Dose modulation, iterative reconstruction, and/or weight based adjustment of the mA/kV was utilized to reduce the radiation dose to as low as reasonably achievable.; CTA of the abdomen and pelvis was performed with the administration of intravenous contrast. Multiplanar reformatted images are provided for review. MIP images are provided for review. Dose modulation, iterative reconstruction, and/or weight based adjustment of the mA/kV was utilized to reduce the radiation dose to as low as reasonably achievable. COMPARISON: 01/25/2021 HISTORY: ORDERING SYSTEM PROVIDED HISTORY: Nodular calcific aortic valve stenosis TECHNOLOGIST PROVIDED HISTORY: Please include aortic valve calcium score What reading provider will be dictating this exam?->CRC; ORDERING SYSTEM PROVIDED HISTORY: Nodular calcific aortic valve stenosis TECHNOLOGIST PROVIDED HISTORY: TAVR Please include aortic valve calcium score Reason for exam:->severe aortic stenosis What reading provider will be dictating this exam?->CRC FINDINGS: Chest: Mediastinum: Moderate coronary artery calcification. No mediastinal, hilar, or axillary lymphadenopathy. Small pericardial effusion. Severe aortic valvular calcification. Ascending thoracic aorta measures 3.7 cm at the level of the mid ascending thoracic aorta. Lungs/pleura: Unchanged 1.1 cm ground-glass opacity of the right lung apex on series 8, image 39. Moderate left pleural effusion. Mild emphysema. Soft Tissues/Bones: Diffuse osteopenia with multilevel degenerative disc disease of the thoracolumbar spine. Abdomen/Pelvis: Organs: Liver is normal in contour and enhancement. Gallbladder is surgically absent. There is moderate extrahepatic and central intrahepatic biliary ductal dilatation as well as moderate dilatation of the main pancreatic duct. No obstructing mass or stone is seen. Adrenals are unremarkable. Spleen is normal in size. No renal calculi or hydronephrosis. Moderate calcific atherosclerosis. No significant iliac stenosis. GI/Bowel: Large volume stool throughout the colon. Bowel is non-dilated without wall thickening. Appendix is not seen though there are no secondary signs of appendicitis. Pelvis: Unremarkable. Peritoneum/Retroperitoneum:No free fluid, free air, organized fluid collection or lymphadenopathy. Bones: Diffuse osteopenia with multilevel degenerative disc disease. 1. Severe aortic valvular calcification. Ascending thoracic aorta measures 3.6 cm. No significant iliac vessel stenosis. 2. Moderate extrahepatic and central intrahepatic biliary ductal dilatation as well as moderate dilatation of the main pancreatic duct. No stone or mass is seen by CT. Consider further evaluation with nonemergent pancreas protocol MRI. 3. Moderate left pleural effusion. 4. Unchanged 1.1 cm ground-glass opacity of the right lung apex. Recommend follow-up chest CT in 6-12 months per Fleischner criteria. 5. Large volume stool throughout the colon. Cta Transcatheter Aortic Valve Replacement    Result Date: 2/19/2021  EXAMINATION: CTA OF THE CHEST; CTA OF THE ABDOMEN AND PELVIS WITH CONTRAST 2/19/2021 3:05 pm TECHNIQUE: Dose modulation, iterative reconstruction, and/or weight based adjustment of the mA/kV was utilized to reduce the radiation dose to as low as reasonably achievable.; CTA of the chest was performed after the administration of intravenous contrast.  Multiplanar reformatted images are provided for review. MIP images are provided for review.  Dose modulation, iterative reconstruction, and/or weight based adjustment of the mA/kV was utilized to reduce the radiation dose to as low as reasonably achievable.; CTA of the abdomen and pelvis was performed with the administration of intravenous contrast. Multiplanar reformatted images are provided for review. MIP images are provided for review. Dose modulation, iterative reconstruction, and/or weight based adjustment of the mA/kV was utilized to reduce the radiation dose to as low as reasonably achievable. COMPARISON: 01/25/2021 HISTORY: ORDERING SYSTEM PROVIDED HISTORY: Nodular calcific aortic valve stenosis TECHNOLOGIST PROVIDED HISTORY: Please include aortic valve calcium score What reading provider will be dictating this exam?->CRC; ORDERING SYSTEM PROVIDED HISTORY: Nodular calcific aortic valve stenosis TECHNOLOGIST PROVIDED HISTORY: TAVR Please include aortic valve calcium score Reason for exam:->severe aortic stenosis What reading provider will be dictating this exam?->CRC FINDINGS: Chest: Mediastinum: Moderate coronary artery calcification. No mediastinal, hilar, or axillary lymphadenopathy. Small pericardial effusion. Severe aortic valvular calcification. Ascending thoracic aorta measures 3.7 cm at the level of the mid ascending thoracic aorta. Lungs/pleura: Unchanged 1.1 cm ground-glass opacity of the right lung apex on series 8, image 39. Moderate left pleural effusion. Mild emphysema. Soft Tissues/Bones: Diffuse osteopenia with multilevel degenerative disc disease of the thoracolumbar spine. Abdomen/Pelvis: Organs: Liver is normal in contour and enhancement. Gallbladder is surgically absent. There is moderate extrahepatic and central intrahepatic biliary ductal dilatation as well as moderate dilatation of the main pancreatic duct. No obstructing mass or stone is seen. Adrenals are unremarkable. Spleen is normal in size. No renal calculi or hydronephrosis. Moderate calcific atherosclerosis. No significant iliac stenosis. GI/Bowel: Large volume stool throughout the colon. Bowel is non-dilated without wall thickening. Appendix is not seen though there are no secondary signs of appendicitis. Pelvis: Unremarkable.  Peritoneum/Retroperitoneum:No free fluid, free air, organized fluid collection or lymphadenopathy. Bones: Diffuse osteopenia with multilevel degenerative disc disease. 1. Severe aortic valvular calcification. Ascending thoracic aorta measures 3.6 cm. No significant iliac vessel stenosis. 2. Moderate extrahepatic and central intrahepatic biliary ductal dilatation as well as moderate dilatation of the main pancreatic duct. No stone or mass is seen by CT. Consider further evaluation with nonemergent pancreas protocol MRI. 3. Moderate left pleural effusion. 4. Unchanged 1.1 cm ground-glass opacity of the right lung apex. Recommend follow-up chest CT in 6-12 months per Fleischner criteria. 5. Large volume stool throughout the colon. Discharge Medications:      Medication List      START taking these medications    metoprolol succinate 50 MG extended release tablet  Commonly known as: TOPROL XL  Take 1 tablet by mouth daily  Start taking on: March 3, 2021     oxyCODONE-acetaminophen 5-325 MG per tablet  Commonly known as: Percocet  Take 1 tablet by mouth every 6 hours as needed for Pain for up to 5 days. Intended supply: 5 days.  Take lowest dose possible to manage pain        CHANGE how you take these medications    aspirin 81 MG EC tablet  Take 1 tablet by mouth daily  Start taking on: March 3, 2021  What changed:   · medication strength  · how much to take        CONTINUE taking these medications    acetaminophen 500 MG tablet  Commonly known as: TYLENOL     BENADRYL ALLERGY PO     predniSONE 50 MG tablet  Commonly known as: DELTASONE     sennosides-docusate sodium 8.6-50 MG tablet  Commonly known as: SENOKOT-S  Take 1 tablet by mouth 2 times daily     venlafaxine 37.5 MG tablet  Commonly known as: EFFEXOR     Vitamin D3 1.25 MG (71931 UT) Caps        STOP taking these medications    amLODIPine 10 MG tablet  Commonly known as: NORVASC     bisacodyl 10 MG suppository  Commonly known as: DULCOLAX           Where to Get Your Medications      You can get these medications from any pharmacy    Bring a paper prescription for each of these medications  · oxyCODONE-acetaminophen 5-325 MG per tablet     Information about where to get these medications is not yet available    Ask your nurse or doctor about these medications  · aspirin 81 MG EC tablet  · metoprolol succinate 50 MG extended release tablet         Time Spent on discharge is more than 45 minutes in the examination, evaluation, counseling and review of medications and discharge plan.    +++++++++++++++++++++++++++++++++++++++++++++++++  [place], [place]  +++++++++++++++++++++++++++++++++++++++++++++++++  NOTE: This report was transcribed using voice recognition software. Every effort was made to ensure accuracy; however, inadvertent computerized transcription errors may be present.

## 2021-03-02 NOTE — PROGRESS NOTES
Physical Therapy  Treatment Note     Name: Arcadio Solorzano  : 1935  MRN: [id number]    Referring Provider:  Jon Holliday MD    Date of Service: 3/2/2021    Re-Evaluating PT: Adonna Cooks PT, DPT    Room #:  4269/4449-W  Diagnosis:  Critical aortic valve stenosis  PMHx/PSHx:  Heart murmur, HTN, Arthritis, Anxiety, Knee pain, Dizziness, Migraine headache, Rhabdomyolysis, Polypharmacy, Malnutrition, Acute delirium, Hyperlipidemia, Pericardial effusion, Back surgery , R TKA , Pericardium surgery 2021  Procedure/Surgery:  Cardiac cath 2021   TAVR 2021   EGD/EUS with biopsy ampulla  Precautions:  Falls, Cognition, Equipment Needs:  Has [address]    SUBJECTIVE:    Pt lives alone in a 2 story home with 2 stairs to enter and 1 rail(s). Bed is on 2nd floor and bath is on 2nd floor with flight and 1 rail(s). Pt ambulated with no AD PTA. Pt reported independent with ADLs. Pt is not actively driving. Pt reported son to set up first floor bed/bath soon. OBJECTIVE:   Re-Evaluation  Date: 2021 Treatment  3/2/21 Short Term/ Long Term   Goals   AM-PAC 6 Clicks 81/70 90/11    Was pt agreeable to Eval/treatment? Yes  Yes     Does pt have pain?  7/10 HA and abdominal discomfort Pt c/o \"bladder hurts\" throughout session    Bed Mobility  Rolling: SBA  Supine to sit: Min A   Sit to supine: Min A   Scooting: Min A NT Supervision   Transfers Sit to stand: Min A   Stand to sit: Min A   Stand pivot: Min<>Mod A using [address] Sit to stand: Min A  Stand to sit: Min A  Stand pivot: Min A using [address]  Supervision   Ambulation   25 feet x2 reps using [address] with Min<>Mod A  20 feet x2 reps, 40 feet x1 rep using [address] with Min A  >150 feet with [address] with Supervision   Stair negotiation: ascended and descended  NT NT 12 steps with 1 rail with Supervision   ROM BUE:  WFL  BLE:  WFL BLE: WFL    Strength BUE:  4-/5  BLE:  4-/5 BLE:  Increase 1/3 grade MMT   Balance Sitting EOB: SBA  Dynamic Standing: Min<>Mod A using WW to correct LOB Sitting: SBA<>Supervision    Standing: Static SBA using [address]; Min A dynamic Sitting EOB:  Independent  Dynamic Standing:  Supervision using [address]     Pt is A & O x 2. Self and place  Sensation:  Pt denied numbness and tingling  Edema:  None noted    Vitals: 98% on RA with HR 72 bpm    Therapeutic Exercises:  NT    Patient education  Pt educated on purpose of safety with mobility, transfers, gait training with focus on upright posture, [address] safety and correcting gait deviations. Patient response to education:   Pt verbalized understanding Pt demonstrated skill Pt requires further education in this area   Yes  Yes with verbal cues and assist Yes      ASSESSMENT:    Comments:  Patient cleared by RN and agreeable to session. Pt was received sitting up in bedside chair. Pt was emotional and confuse during session requiring reassurance and support. Pt requested to use commode and was assist to/from commode with constant assist for safety and [address] management. Pt with improved balance this date. Pt fatigued and requested seated rest break following toileting prior to amb in hallway. Pt assisted back to bedside chair and continued to c/o \"bladder pain\". Pt agreeable to amb for increased distance, but was limited by continued c/o bladder pain and requesting to return to chair. Pt was left with call button within reach and RN notified of pt's performance and complaints of pain. Treatment:  Patient practiced and was instructed in the following treatment:     Bed mobility training - pt given verbal and tactile cues to facilitate proper sequencing and safety during rolling and supine>sit as well as provided with physical assistance for trunk to complete task    Transfer training with VCs for hand placement, sequencing and technique. Physical assist to complete task and correct LOB and unsteadiness upon standing.  Standing balance retraining with VCs for upright posture and forward gaze.   Physical assist to correct unsteadiness. Standing weight shifts to R/L with physical assist.      · Gait training- pt was given verbal and tactile cues to facilitate increased step height, widen YSABEL and body positioning inside walker during ambulation as well as provided with physical assistance to complete task. · Therapeutic Exercises/Activities as noted above.  Patient education provided with focus on upright tolerance, safety, correcting posterior LOB and benefits of participating with therapy. PLAN OF CARE:    Patient is making good progress towards goals. Will continue with current POC.       Time in  0910  Time out  0935    Total Treatment Time  25 minutes     CPT codes:  [] Low Complexity PT evaluation 25975  [] Moderate Complexity PT evaluation 29686  [] High Complexity PT evaluation 20585  [] PT Re-evaluation 18593  [] Gait training 48110 - minutes  [] Manual therapy 14223 - minutes  [x] Therapeutic activities 68596 25 minutes  [] Therapeutic exercises 52327 - minutes  [] Neuromuscular reeducation 70737 - minutes     Pollo Pedersen, PT, DPT  License [id number]

## 2021-03-02 NOTE — PROGRESS NOTES
INPATIENT MEDICAL ONCOLOGY PROGRESS NOTE    SUBJECTIVE:    Afebrile. No pain. The shortness of breath had improved. D/C back to rehab facility today  Discussed PET scan needed as outpatient     OBJECTIVE  VITALS:  BP (!) 113/57   Pulse 65   Temp 98 °F (36.7 °C) (Oral)   Resp 18   Ht 5' 8\" (1.727 m)   Wt 140 lb (63.5 kg)   SpO2 98%   BMI 21.29 kg/m²   ENT:  oropharynx clear  NECK:  No  lymphadenopathy. HEMATOLOGIC/LYMPHATICS:  No abnormal lymphadenopathy. LUNGS: Clear bilaterally   CARDIOVASCULAR:  Regular rate and rhythm. No clicks, murmurs, rubs or gallops. ABDOMEN:  Soft, nontender. No ascites. No mass or organomegaly. NEUROLOGIC:  No focal deficits. Anxious   EXTREMITIES: without clubbing, cyanosis, or edema. DIAGNOSTIC DATA  Labs:    Lab Results   Component Value Date    WBC 9.1 03/02/2021    HGB 10.8 (L) 03/02/2021    HCT 34.2 03/02/2021    MCV 87.9 03/02/2021     03/02/2021     Lab Results   Component Value Date     03/02/2021    K 4.0 03/02/2021     03/02/2021    CO2 30 (H) 03/02/2021    BUN 20 03/02/2021    CREATININE 0.9 03/02/2021    GLUCOSE 97 03/02/2021    CALCIUM 8.8 03/02/2021    PROT 6.3 (L) 02/23/2021    LABALBU 3.6 02/23/2021    BILITOT 0.2 02/23/2021    ALKPHOS 57 02/23/2021    AST 17 02/23/2021    ALT 9 02/23/2021    LABGLOM 60 03/02/2021    GFRAA >60 03/02/2021     Lab Results   Component Value Date    CEA 7.0 (H) 02/21/2021     IMPRESSION:  Patient Active Problem List   Diagnosis    Anxiety    Migraine headache    Accidental drug overdose    Essential hypertension, benign    GERD (gastroesophageal reflux disease)    Dementia (HCC)    Polypharmacy    Malnutrition (HCC)    Acute delirium    Pericardial effusion    Pain syndrome, chronic    Critical aortic valve stenosis    Ampulla of Vater mass    Palliative care by specialist    Moderate protein-calorie malnutrition ([address]     82 y. o. female history of dementia, GERD, hypertension, critical aortic stenosis who had recent admission in January 2021 for presyncope. Noted to have large pericardial effusion with tamponade physiology.    Emergent pericardiocentesis with 1.4 liters removed by CT surgery. Cytology suspicious for adenocarcinoma.       She was also noted to have critical AS. Cardiology team following     Emergent echocardiogram shows moderate circumferential pericardial effusion with no tamponade physiology.     CTA chest/abdomen/pelvis severe aortic calcifications  Moderate extrahepatic and central intrahepatic biliary ductal dilatation as well as moderate dilatation of the main pancreatic duct. Moderate left pleural effusion. Unchanged 1.1 cm ground glass opacity right lung apex    EUS findings:   Esophagus: normal  Stomach: normal  Duodenum: Mild fullness of the ampulla without convincing evidence for an ampullary mass. Evaluation under endoscopic ultrasound did not reveal any abnormality in the duodenal wall at the level of the ampulla. There is normal tapering of the common bile duct and main pancreatic duct to the ampulla. The region of the ampulla was biopsied using a 22-gauge FNA needle making 2 passes. Endoscopic forcep biopsies were also used to grasp 2 samples from the ampulla. Pancreas: 3 mm pancreatic duct in the pancreatic head which tapers down to 2 mm in the body and tail. Multiple subcentimeter cyst along the pancreatic duct consistent with IPMN's. Bile Duct: Dilated to 12 mm  Gallbladder: Surgically absent    PLAN:  MRI abdomen-? ampullary neoplasm  s/p EUS 2/26/2021 Ampulla biopsied-no ampullary mass is seen, pathology: evaluation limited by scant cellularity-negative for malignant cells. Findings-3 mm pancreatic duct in the pancreatic head which tapers down to 2 mm in the body and tail.   Multiple subcentimeter cyst along the pancreatic duct consistent with IPMN's.  -Chromogranin A: (56) CEA (7), CA 19-9 (6), AFP (3)  -Cytology from pericardial fluid (2/24/2021) was negative for malignant cells. - Monitor CBCD, currently stable 10.8/34.2    - PET scan as OP.   Will continue to follow      [address]   [phone]     The patient was seen and examined, I agree with HAIDER Gan-CNP  findings, assessment and plan as outlined.      Soheila Anderson MD   HEMATOLOGY/MEDICAL [address] [facility] [address]  [address] [place] [address]  [address] [id number]  Dept: [phone]

## 2021-03-02 NOTE — CARE COORDINATION
3/2,  Discharge acknowledged. Per Silvana has been given. Transportation has been set up for a 12:00pm  by [address] for patient to return back to [facility]. Those informed:  Patient's son-Cayden (vm left), nursing and Victor Manuel Wall at facility. Face sheet and envelope on soft chart. SW to follow for further discharge planning needs.       SANDRA Paul  [facility] Case Management  [phone]

## 2021-03-02 NOTE — PROGRESS NOTES
Structural heart patient. Message was sent to Cardiology office need for limited echo order placed. Placed for DR Bong Parnell.

## 2021-03-03 ENCOUNTER — TELEPHONE (OUTPATIENT)
Dept: CARDIOLOGY | Age: 86
End: 2021-03-03

## 2021-03-04 ENCOUNTER — HOSPITAL ENCOUNTER (OUTPATIENT)
Dept: CARDIOLOGY | Age: 86
Discharge: HOME | End: 2021-03-04
Payer: MEDICARE

## 2021-03-04 DIAGNOSIS — I35.0 NONRHEUMATIC AORTIC VALVE STENOSIS: ICD-10-CM

## 2021-03-04 DIAGNOSIS — I31.39 PERICARDIAL EFFUSION: ICD-10-CM

## 2021-03-04 PROCEDURE — 93308 TTE F-UP OR LMTD: CPT

## 2021-03-23 ENCOUNTER — OFFICE VISIT (OUTPATIENT)
Dept: CARDIOLOGY CLINIC | Age: 86
End: 2021-03-23
Payer: MEDICARE

## 2021-03-23 ENCOUNTER — HOSPITAL ENCOUNTER (OUTPATIENT)
Dept: CARDIOLOGY | Age: 86
Discharge: HOME | End: 2021-03-23
Payer: MEDICARE

## 2021-03-23 VITALS
HEIGHT: 68 IN | SYSTOLIC BLOOD PRESSURE: 140 MMHG | HEART RATE: 64 BPM | DIASTOLIC BLOOD PRESSURE: 80 MMHG | TEMPERATURE: 97.6 F | BODY MASS INDEX: 21.29 KG/M2

## 2021-03-23 DIAGNOSIS — Z95.2 S/P TAVR (TRANSCATHETER AORTIC VALVE REPLACEMENT): Primary | ICD-10-CM

## 2021-03-23 DIAGNOSIS — I31.39 PERICARDIAL EFFUSION: ICD-10-CM

## 2021-03-23 LAB
LV EF: 60 %
LVEF MODALITY: NORMAL

## 2021-03-23 PROCEDURE — 93306 TTE W/DOPPLER COMPLETE: CPT

## 2021-03-23 PROCEDURE — 99213 OFFICE O/P EST LOW 20 MIN: CPT | Performed by: PHYSICIAN ASSISTANT

## 2021-03-23 NOTE — PROGRESS NOTES
[facility]  Visit Note      Patient name: Shin Johnson    Reason for visit: TAVR follow up    Primary Care Physician: Taylor Peng DO    Date of service: 3/23/2021    Chief Complaint: TAVR follow up - incision check    HPI: Mrs. Edmond Wilkinson presents for follow up s/p TAVR on 2/24/21. She is doing well. She remains in SNF but relates she is not doing any physical therapy. She is ambulating with a walker without difficulty. She denies chest discomfort, SOB/THOMPSON, orthopnea, PND, LE edema, palpitations or syncope. Allergies: Allergies   Allergen Reactions    Other Rash     \"A type of IVP dye from the 50's\"       Home medications:    Current Outpatient Medications   Medication Sig Dispense Refill    magnesium hydroxide (MILK OF MAGNESIA) 400 MG/5ML suspension Take 30 mLs by mouth daily as needed for Constipation      aspirin 81 MG EC tablet Take 1 tablet by mouth daily 30 tablet 3    metoprolol succinate (TOPROL XL) 50 MG extended release tablet Take 1 tablet by mouth daily 30 tablet 3    predniSONE (DELTASONE) 50 MG tablet Take 50 mg by mouth daily      acetaminophen (TYLENOL) 500 MG tablet Take 650 mg by mouth every 6 hours as needed for Pain      Cholecalciferol (VITAMIN D3) 1.25 MG (98185 UT) CAPS Take by mouth once a week      venlafaxine (EFFEXOR) 37.5 MG tablet Take 75 mg by mouth daily       diphenhydrAMINE HCl (BENADRYL ALLERGY PO) Take 25 mg by mouth daily       sennosides-docusate sodium (SENOKOT-S) 8.6-50 MG tablet Take 1 tablet by mouth 2 times daily       No current facility-administered medications for this visit.       Facility-Administered Medications Ordered in Other Visits   Medication Dose Route Frequency Provider Last Rate Last Admin    perflutren lipid microspheres (DEFINITY) injection 1.65 mg  1.5 mL Intravenous ONCE PRN GEOVANNY Pettit           Past Medical History:  Past Medical History:   Diagnosis Date    Accidental drug overdose 5/21/2014    Acute delirium 2014    Anxiety     Arthritis     Dizziness, nonspecific     Generalized headaches     Heart murmur     stable per pt    Hyperlipidemia     Hypertension     Knee pain     right    Malnutrition ([address] 2014    Migraine headache     Pericardial effusion 2021    Polypharmacy 2014    Reflux gastritis     Rhabdomyolysis 2014    Sinus problem        Past Surgical History:  Past Surgical History:   Procedure Laterality Date    AORTIC VALVE REPLACEMENT N/A 2021    TRANSCATHETER AORTIC VALVE REPLACEMENT FEMORAL APPROACH ANDPERICARDIALCENTESIS performed by Dallin Rojas MD at [facility]      lumbar    CHOLECYSTECTOMY      HYSTERECTOMY      PERICARDIUM SURGERY N/A 2021    PERICARDIOCENTESIS performed by Lynsey Chavez MD at [facility]  2012    right    TRANSESOPHAGEAL ECHOCARDIOGRAM N/A 2021    TRANSESOPHAGEAL ECHOCARDIOGRAM performed by Dallin Rojas MD at [address] [facility] N/A 2021    EGD W/EUS FNA performed by Gwen Díaz MD at [facility] History:  Social History     Socioeconomic History    Marital status:      Spouse name: Not on file    Number of children: Not on file    Years of education: Not on file    Highest education level: Not on file   Occupational History    Not on file   Social Needs    Financial resource strain: Not on file    Food insecurity     Worry: Not on file     Inability: Not on file    Transportation needs     Medical: Not on file     Non-medical: Not on file   Tobacco Use    Smoking status: Former Smoker     Years: 45.00     Quit date: 2007     Years since quittin.5    Smokeless tobacco: Never Used   Substance and Sexual Activity    Alcohol use: No    Drug use: No    Sexual activity: Not on file   Lifestyle    Physical activity     Days per week: Not on file     Minutes per session: Not on file    Stress: Not on file   Relationships    Social connections     Talks on phone: Not on file     Gets together: Not on file     Attends Hindu service: Not on file     Active member of club or organization: Not on file     Attends meetings of clubs or organizations: Not on file     Relationship status: Not on file    Intimate partner violence     Fear of current or ex partner: Not on file     Emotionally abused: Not on file     Physically abused: Not on file     Forced sexual activity: Not on file   Other Topics Concern    Not on file   Social History Narrative    Not on file       Family History:  No family history on file. Review of Systems:  Constitutional: Denies fevers, chills, or weight loss. HEENT: Denies visual changes or hearing loss. Heart: As per HPI. Lungs: Denies shortness of breath, cough, or wheezing. Gastrointestinal: Denies nausea, vomiting, constipation, or diarrhea. Genitourinary: dysuria or hematuria. Psychiatric: Patient denies anxiety or depression. Neurologic: Patient denies weakness of the extremities, dizziness, or headaches. All other ROS checked and found to be negative. Objective:  Vitals BP (!) 140/80 (Site: Right Upper Arm, Position: Sitting, Cuff Size: Medium Adult)   Pulse 64   Temp 97.6 °F (36.4 °C) (Temporal)   Ht 5' 8\" (1.727 m)   BMI 21.29 kg/m²   General Appearance: Pleasant 82y.o. year old female who appears stated age. Communicates well, no acute distress. HEENT: Head is normocephalic, atraumatic. EOMs intact, PERRL. Trachea midline. Lungs: Normal respiratory rate and normal effort. She is not in respiratory distress. Breath sounds clear to auscultation. No wheezes. Heart: Normal rate. Regular rhythm. S1 normal and S2 normal. No  murmur. Chest: Symmetric chest wall expansion. Extremities: Normal range of motion. No edema. Neurological: Patient is alert and oriented to person, place and time.    Skin: Warm and dry.   Abdomen: Abdomen is soft and non-distended. Bowel sounds are normal.    Pulses: Distal pulses are intact. Skin: Warm and dry without lesions. Assessment:   1. Severe, symptomatic AS s/p TAVR - doing well. Mean gradient 20 mmHg by echo today (down from 30 mmHg POD #1)  2. NHYA class II currently  3. HTN  4. Recurrent pericardial effusion  5. Mild cognitive impairment      Plan:   1. Follow up for one year echo   2. Follow up with Lawrence Hanna as scheduled  3.  SBE prophylaxis       Electronically signed by Kacie Alexandre PA-C on 3/23/2021 at 1:00 PM

## 2021-03-23 NOTE — PATIENT INSTRUCTIONS
Follow up in valve clinic 2/22/2022 at 9:30am for echocardiogram    Follow up with Lawrence Bello and Sharath Devine as scheduled    Reminder: antibiotic required prior to dental appointments/procedures

## 2021-06-13 LAB
SARS-COV-2: NOT DETECTED
SOURCE: NORMAL

## 2021-09-25 LAB
SARS-COV-2: NOT DETECTED
SOURCE: NORMAL

## 2021-10-12 ENCOUNTER — OFFICE VISIT (OUTPATIENT)
Dept: CARDIOLOGY CLINIC | Age: 86
End: 2021-10-12
Payer: MEDICARE

## 2021-10-12 VITALS
DIASTOLIC BLOOD PRESSURE: 80 MMHG | HEART RATE: 69 BPM | SYSTOLIC BLOOD PRESSURE: 128 MMHG | BODY MASS INDEX: 21.29 KG/M2 | HEIGHT: 68 IN | RESPIRATION RATE: 12 BRPM

## 2021-10-12 DIAGNOSIS — Z95.2 S/P TAVR (TRANSCATHETER AORTIC VALVE REPLACEMENT): Primary | ICD-10-CM

## 2021-10-12 DIAGNOSIS — I10 ESSENTIAL HYPERTENSION, BENIGN: ICD-10-CM

## 2021-10-12 DIAGNOSIS — I25.10 CORONARY ARTERY DISEASE INVOLVING NATIVE CORONARY ARTERY OF NATIVE HEART WITHOUT ANGINA PECTORIS: ICD-10-CM

## 2021-10-12 PROCEDURE — 99213 OFFICE O/P EST LOW 20 MIN: CPT | Performed by: INTERNAL MEDICINE

## 2021-10-12 PROCEDURE — 93000 ELECTROCARDIOGRAM COMPLETE: CPT | Performed by: INTERNAL MEDICINE

## 2021-10-12 RX ORDER — ROSUVASTATIN CALCIUM 10 MG/1
10 TABLET, COATED ORAL DAILY
Qty: 90 TABLET | Refills: 1
Start: 2021-10-12

## 2021-10-12 RX ORDER — RIVASTIGMINE TARTRATE 6 MG/1
6 CAPSULE ORAL 2 TIMES DAILY
COMMUNITY

## 2021-10-12 RX ORDER — METOPROLOL SUCCINATE 50 MG/1
50 TABLET, EXTENDED RELEASE ORAL 2 TIMES DAILY
Qty: 180 TABLET | Refills: 3
Start: 2021-10-12

## 2021-10-12 NOTE — PROGRESS NOTES
[facility]   Heart and [facility] [place]   Clinic Note     Date:10/12/21   Patient Name:Abiola Soto  YOB: 1935  Age: 82 y.o. Primary Care Provider: DO Jules Garcia     This is a pleasant 80-year-old  female who presents unaccompanied. She continues to reside at a facility. She denies chest pain, dyspnea, orthopnea, PND, lower extremity edema, palpitations, syncope or presyncope. She is not conversant if not talked to. A focused history review includes:  1. Aortic stenosis:  a. TTE January 2021 with normal LV size and function, mild RV enlargement and moderate dysfunction, mild MR, moderate TR, critical aortic stenosis with mean gradient of 100.  b. Coronary angiogram February 2021 (RK): Mild to moderate nonobstructive CAD  c. 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 Pericardiocentesis was also performed intraoperatively due to recurrent large pericardial effusion. i. Postop day 1: LVEF greater than 75%, mean gradient 26, DVI 0.56, intracavitary gradient of 14  ii. Postop month 1: Mean gradient 21. Otherwise no changes  2. Large pericardial effusion status post pericardiocentesis of approximately 1400 mL of straw-colored fluid (Dr. Rakesh Nicole, January 25, 2021)  a. Cytology \"suspicious for adenocarcinoma\"  b. There were small bilateral pleural effusions (left greater than right) with then area of atelectasis versus infiltrate in the left lower lobe and adjacent lingula  3. Hypertension  4. Hypercholesterolemia  5. Osteoarthritis  6. 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 Plan was for outpatient PET scan  8.  Dementia    Past History    Past Medical History:         Diagnosis Date    Accidental drug overdose 5/21/2014    Acute delirium 12/26/2014    Anxiety     Arthritis     Dizziness, nonspecific     Generalized headaches     Heart murmur     stable per pt    Hyperlipidemia     Hypertension     Knee pain     right    Malnutrition ([address] 12/26/2014    Migraine headache     Pericardial effusion 01/25/2021    Polypharmacy 12/26/2014    Reflux gastritis     Rhabdomyolysis 5/21/2014    Sinus problem        Past Surgical History:  Past Surgical History:   Procedure Laterality Date    AORTIC VALVE REPLACEMENT N/A 2/24/2021    TRANSCATHETER AORTIC VALVE REPLACEMENT FEMORAL APPROACH ANDPERICARDIALCENTESIS performed by August Joya MD at [facility]  2007    lumbar    CHOLECYSTECTOMY      HYSTERECTOMY      PERICARDIUM SURGERY N/A 1/25/2021    PERICARDIOCENTESIS performed by Pao Cabello MD at [facility]  9/17/2012    right    TRANSESOPHAGEAL ECHOCARDIOGRAM N/A 2/24/2021    TRANSESOPHAGEAL ECHOCARDIOGRAM performed by August Joya MD at [address] N/A 2/26/2021    EGD W/EUS FNA performed by Melvina Joshi MD at [facility] History:    Social History     Tobacco History     Smoking Status  Former Smoker Quit date  9/11/2007 Smoking Frequency  For 45 years    Smokeless Tobacco Use  Never Used          Alcohol History     Alcohol Use Status  No          Drug Use     Drug Use Status  No          Sexual Activity     Sexually Active  Not Asked                    Family History:-  Reviewed.   No pertinent family history    Review of Systems   As above        Medications     Current Outpatient Medications   Medication Sig Dispense Refill    Multiple Vitamin (MULTI-DAY VITAMINS PO) Take by mouth daily      Memantine HCl (NAMENDA XR PO) Take by mouth daily      rivastigmine (EXELON) 6 MG capsule Take 6 mg by mouth 2 times daily      metoprolol succinate (TOPROL XL) 50 MG extended release tablet Take 1 tablet by mouth 2 times daily 180 tablet 3    rosuvastatin (CRESTOR) 10 MG tablet Take 1 tablet by mouth daily 90 tablet 1  magnesium hydroxide (MILK OF MAGNESIA) 400 MG/5ML suspension Take 30 mLs by mouth daily as needed for Constipation      aspirin 81 MG EC tablet Take 1 tablet by mouth daily 30 tablet 3    predniSONE (DELTASONE) 50 MG tablet Take 50 mg by mouth daily      acetaminophen (TYLENOL) 500 MG tablet Take 650 mg by mouth every 6 hours as needed for Pain      sennosides-docusate sodium (SENOKOT-S) 8.6-50 MG tablet Take 1 tablet by mouth 2 times daily      Cholecalciferol (VITAMIN D3) 1.25 MG (18452 UT) CAPS Take by mouth once a week      venlafaxine (EFFEXOR) 37.5 MG tablet Take 112.5 mg by mouth daily       diphenhydrAMINE HCl (BENADRYL ALLERGY PO) Take 25 mg by mouth daily        No current facility-administered medications for this visit. Physical Examination      /80   Pulse 69   Resp 12   Ht 5' 8\" (1.727 m)   BMI 21.29 kg/m²     General: No acute distress, appears as stated age, nonicteric  Head: Atraumatic, no gross abnormalities or bruises  Neck: Supple and nontender, no carotid bruits, normal JVP. Lungs: Clear to auscultation bilaterally, no wheezes, rales, or rhonchi  Heart: Regular rate and rhythm. 2/6 early to mid peaking systolic ejection murmur over the upper right sternal border. Abdomen: Soft, nontender, nondistended, normal bowel sounds  Extremities: No obvious deformities, no cyanosis, no edema  Neurological: Alert and oriented to self, city, year, time of year, EOMI, moving all extremities x4  Psychological: Normal mood and affect, cooperative  Skin: Color, texture, and turgor normal for age          Labs/Imaging/Diagnostics   Personally reviewed:    Lab Results   Component Value Date     08/04/2021    K 3.7 08/04/2021    K 4.3 02/23/2021     08/04/2021    CO2 28 08/04/2021    BUN 26 08/04/2021    CREATININE 0.9 08/04/2021    GLUCOSE 79 08/04/2021    GLUCOSE 90 05/16/2012    CALCIUM 9.0 08/04/2021        CrCl cannot be calculated (Unknown ideal weight.).     Lab Results   Component Value Date    WBC 11.5 08/04/2021    HGB 12.2 08/04/2021    HCT 38.9 08/04/2021    MCV 87.8 08/04/2021     08/04/2021       Lab Results   Component Value Date    ALT 9 02/23/2021    AST 17 02/23/2021    ALKPHOS 57 02/23/2021    BILITOT 0.2 02/23/2021       Lab Results   Component Value Date    LABALBU 3.6 02/23/2021       Lab Results   Component Value Date    CHOL 216 (H) 02/27/2019    CHOL 244 (H) 07/19/2016    CHOL 241 (H) 02/22/2014     Lab Results   Component Value Date    TRIG 63 02/27/2019    TRIG 66 07/19/2016    TRIG 53 02/22/2014     Lab Results   Component Value Date    HDL 63 02/27/2019    HDL 87 07/19/2016    HDL 92 02/22/2014     Lab Results   Component Value Date    LDLCALC 140 (H) 02/27/2019    LDLCALC 144 (H) 07/19/2016    LDLCALC 138 (H) 02/22/2014     Lab Results   Component Value Date    LABVLDL 13 02/27/2019    LABVLDL 13 07/19/2016     No results found for: [facility]    Lab Results   Component Value Date    CKTOTAL 101 12/25/2014    CKMB 14.1 (H) 05/21/2014    TROPONINI <0.01 02/20/2021       Personally interpreted the following studies.:  EKG today normal sinus rhythm with PACs      Assessment and Plan:        82-year-old  female who presents today unaccompanied for follow-up. She is able to answer questions properly and she denies any cardiac symptoms. Her exam is reassuring. Diagnosis Orders   1. S/P TAVR (transcatheter aortic valve replacement)     2. Coronary artery disease involving native coronary artery of native heart without angina pectoris     3.  Essential hypertension, benign         Continue aspirin 81 mg daily   Start rosuvastatin 10 mg daily-preferably higher dose but given her frailty I would limit to moderate intensity   Endocarditis prophylaxis before any procedures   Increase metoprolol to 50 mg p.o. twice daily   We will defer to her PCP addressing her chronic high-dose steroid and follow-up with oncology regarding PET scan     Assessment and plan reviewed with the patient and questions answered in full     Follow up as scheduled in February at the structural heart clinic    We appreciate the opportunity to participate in Her care!       Katherine Mar MD, [facility]  Interventional Cardiology/Structural Heart Disease  Office: [phone]  Fax: [phone]  Coordinator: Alexandro Khan

## 2021-10-12 NOTE — PATIENT INSTRUCTIONS
1. Increase metoprolol to 50 mg twice a day  2. Rosuvastatin 10 mg once daily  3. Continue aspirin 81 mg daily and other medications as is  4. Amoxicillin 2000 mg once 60 minutes before dental procedures  5.  Return in February to [facility]. with echocardiogram as currently scheduled

## 2021-11-21 LAB
SARS-COV-2: NOT DETECTED
SOURCE: NORMAL

## 2022-02-22 ENCOUNTER — TELEPHONE (OUTPATIENT)
Dept: CARDIOLOGY CLINIC | Age: 87
End: 2022-02-22

## 2022-02-22 ENCOUNTER — HOSPITAL ENCOUNTER (OUTPATIENT)
Dept: CARDIOLOGY | Age: 87
Discharge: HOME | End: 2022-02-22

## 2022-02-22 NOTE — TELEPHONE ENCOUNTER
Gisele Banegas at [facility] was contacted. She stated the pt must be accompanied by the son as she has cognitive issues. She stated she was unaware the appt was for today. New appt for pt was scheduled with her on 3/22/22. She stated she will notify the pts son.

## 2022-02-22 NOTE — TELEPHONE ENCOUNTER
----- Message from Kenny Sanchez [place] sent at 2/22/2022 12:06 PM EST -----  Patient was a no show for her 1 year tavr visit. Please reschedule her for echo and visit with me by 4/24/22.  Thanks!!

## 2022-03-22 ENCOUNTER — HOSPITAL ENCOUNTER (OUTPATIENT)
Dept: CARDIOLOGY | Age: 87
Discharge: HOME | End: 2022-03-22

## 2022-04-20 LAB
SARS-COV-2: NOT DETECTED
SOURCE: NORMAL

## 2022-04-23 LAB
SARS-COV-2: NOT DETECTED
SOURCE: NORMAL

## 2022-04-29 LAB — SARS-COV-2, PCR: NOT DETECTED

## 2022-05-12 LAB — SARS-COV-2, PCR: NOT DETECTED

## 2022-05-28 LAB
SARS-COV-2: NOT DETECTED
SOURCE: NORMAL

## 2022-06-26 LAB
SARS-COV-2: NOT DETECTED
SOURCE: NORMAL

## 2022-06-27 LAB — SOURCE: NORMAL

## 2022-06-29 LAB — SARS-COV-2, PCR: NOT DETECTED

## 2022-11-08 LAB — SARS-COV-2, PCR: NOT DETECTED

## 2023-01-18 NOTE — ED NOTES
Bed: 21  Expected date:   Expected time:   Means of arrival:   Comments:  thais Mcpherson, RN  01/24/21 1935
Patient in ct at this time and patients son at the bedside      Milo Linn RN  01/25/21 2048
Patient placed on pure wick at this time      Leeroy Reid, JOSÉ  01/24/21 4912
Patient was taken off the monitor by this [facility] per JOSÉ Thomason  01/25/21 0150
Pt refusing to urinate using pure wick, pt placed on bed lambert Klein, RN  01/24/21 8973
Pt taken to CT with transport      Noreen Mendez RN  01/24/21 8345
Report given to Kaylee Cherry RN  01/24/21 3156
220

## 2023-01-20 NOTE — PLAN OF CARE
Problem: Skin Integrity:  Goal: Will show no infection signs and symptoms  Description: Will show no infection signs and symptoms  Outcome: Ongoing  Goal: Absence of new skin breakdown  Description: Absence of new skin breakdown  Outcome: Ongoing     Problem: Skin Integrity:  Goal: Will show no infection signs and symptoms  Description: Will show no infection signs and symptoms  Outcome: Ongoing  Goal: Absence of new skin breakdown  Description: Absence of new skin breakdown  Outcome: Ongoing     Problem: Falls - Risk of:  Goal: Will remain free from falls  Description: Will remain free from falls  Outcome: Ongoing  Goal: Absence of physical injury  Description: Absence of physical injury  Outcome: Ongoing     Problem: Pain:  Goal: Pain level will decrease  Description: Pain level will decrease  Outcome: Ongoing  Goal: Control of acute pain  Description: Control of acute pain  Outcome: Ongoing  Goal: Control of chronic pain  Description: Control of chronic pain  Outcome: Ongoing Detail Level: Zone Photo Preface (Leave Blank If You Do Not Want): Photographs were obtained today

## 2024-05-07 NOTE — PROGRESS NOTES
Called Son and unable to verify medications. Called [facility] and able to identify a few medications. Not all.            Eyad Mcneil X Size Of Lesion In Cm (Optional): 0 Detail Level: Detailed Introduction Text (Please End With A Colon): The following procedure was deferred: Instructions (Optional): 50 min excision

## 2024-06-04 LAB
BACTERIA URNS QL MICRO: ABNORMAL
RBC #/AREA URNS HPF: ABNORMAL /HPF
WBC #/AREA URNS HPF: ABNORMAL /HPF

## 2024-07-15 LAB
BACTERIA: ABNORMAL
BASOPHILS # BLD: 0.04 K/UL (ref 0–0.2)
BASOPHILS NFR BLD: 1 % (ref 0–2)
BILIRUBIN, URINE: NEGATIVE
COLOR, UA: YELLOW
EOSINOPHIL # BLD: 0.31 K/UL (ref 0.05–0.5)
EOSINOPHILS RELATIVE PERCENT: 5 % (ref 0–6)
EPITHELIAL CELLS, UA: ABNORMAL /HPF
ERYTHROCYTE [DISTWIDTH] IN BLOOD BY AUTOMATED COUNT: 14.6 % (ref 11.5–15)
GLUCOSE URINE: NEGATIVE MG/DL
HCT VFR BLD AUTO: 40.4 % (ref 34–48)
HGB BLD-MCNC: 12.3 G/DL (ref 11.5–15.5)
IMM GRANULOCYTES # BLD AUTO: <0.03 K/UL (ref 0–0.58)
IMM GRANULOCYTES NFR BLD: 0 % (ref 0–5)
KETONES, URINE: NEGATIVE MG/DL
LEUKOCYTE ESTERASE, URINE: ABNORMAL
LYMPHOCYTES NFR BLD: 1.52 K/UL (ref 1.5–4)
LYMPHOCYTES RELATIVE PERCENT: 22 % (ref 20–42)
MCH RBC QN AUTO: 27.6 PG (ref 26–35)
MCHC RBC AUTO-ENTMCNC: 30.4 G/DL (ref 32–34.5)
MCV RBC AUTO: 90.6 FL (ref 80–99.9)
MONOCYTES NFR BLD: 0.77 K/UL (ref 0.1–0.95)
MONOCYTES NFR BLD: 11 % (ref 2–12)
NEUTROPHILS NFR BLD: 61 % (ref 43–80)
NEUTS SEG NFR BLD: 4.2 K/UL (ref 1.8–7.3)
NITRITE, URINE: NEGATIVE
PH, URINE: 6 (ref 5–9)
PLATELET, FLUORESCENCE: 127 K/UL (ref 130–450)
PMV BLD AUTO: 13.6 FL (ref 7–12)
PROTEIN UA: NEGATIVE MG/DL
RBC # BLD AUTO: 4.46 M/UL (ref 3.5–5.5)
RBC UA: ABNORMAL /HPF
SPECIFIC GRAVITY UA: 1.02 (ref 1–1.03)
TURBIDITY: CLEAR
URINE HGB: NEGATIVE
UROBILINOGEN, URINE: 1 EU/DL (ref 0–1)
WBC OTHER # BLD: 6.9 K/UL (ref 4.5–11.5)
WBC UA: ABNORMAL /HPF

## 2024-07-16 LAB
ALBUMIN SERPL-MCNC: 3.5 G/DL (ref 3.5–5.2)
ALP SERPL-CCNC: 76 U/L (ref 35–104)
ALT SERPL-CCNC: 10 U/L (ref 0–32)
AMMONIA PLAS-SCNC: NORMAL UMOL/L (ref 11–51)
ANION GAP SERPL CALCULATED.3IONS-SCNC: 7 MMOL/L (ref 7–16)
AST SERPL-CCNC: 18 U/L (ref 0–31)
BILIRUB SERPL-MCNC: 0.4 MG/DL (ref 0–1.2)
BUN SERPL-MCNC: 15 MG/DL (ref 6–23)
CALCIUM SERPL-MCNC: 9.3 MG/DL (ref 8.6–10.2)
CHLORIDE SERPL-SCNC: 106 MMOL/L (ref 98–107)
CO2 SERPL-SCNC: 29 MMOL/L (ref 22–29)
CREAT SERPL-MCNC: 0.8 MG/DL (ref 0.5–1)
GFR, ESTIMATED: 77 ML/MIN/1.73M2
GLUCOSE SERPL-MCNC: 84 MG/DL (ref 74–99)
POTASSIUM SERPL-SCNC: 3.7 MMOL/L (ref 3.5–5)
PROT SERPL-MCNC: 6 G/DL (ref 6.4–8.3)
SODIUM SERPL-SCNC: 142 MMOL/L (ref 132–146)
TSH SERPL DL<=0.05 MIU/L-ACNC: 1.83 UIU/ML (ref 0.27–4.2)

## 2024-07-17 LAB
CULTURE: ABNORMAL
SPECIMEN DESCRIPTION: ABNORMAL

## 2024-07-23 LAB
BACTERIA URNS QL MICRO: ABNORMAL
BILIRUB UR QL STRIP: NEGATIVE
CLARITY UR: CLEAR
COLOR UR: YELLOW
GLUCOSE UR STRIP-MCNC: NEGATIVE MG/DL
HGB UR QL STRIP.AUTO: ABNORMAL
KETONES UR STRIP-MCNC: NEGATIVE MG/DL
LEUKOCYTE ESTERASE UR QL STRIP: ABNORMAL
NITRITE UR QL STRIP: POSITIVE
PH UR STRIP: 6 [PH] (ref 5–9)
PROT UR STRIP-MCNC: ABNORMAL MG/DL
RBC #/AREA URNS HPF: ABNORMAL /HPF
SP GR UR STRIP: 1.02 (ref 1–1.03)
UROBILINOGEN UR STRIP-ACNC: 1 EU/DL (ref 0–1)
WBC #/AREA URNS HPF: ABNORMAL /HPF

## 2024-07-26 LAB
MICROORGANISM SPEC CULT: ABNORMAL
SERVICE CMNT-IMP: ABNORMAL
SPECIMEN DESCRIPTION: ABNORMAL

## 2024-10-30 NOTE — DISCHARGE INSTR - COC
Continuity of Care Form    Patient Name: Misti Padilla   :  1935  MRN:  [id number]    Admit date:  2021  Discharge date:  2021    Code Status Order: Full Code   Advance Directives:   [facility] Documentation       Date/Time Healthcare Directive Type of Healthcare Directive Copy in [address] Agent's Name Healthcare Agent's Phone Number    21  Yes, patient has an advance directive for healthcare treatment -- -- -- --  --            Admitting Physician:  No admitting provider for patient encounter. PCP: Sreedhar Moise DO    Discharging Nurse: [facility] Unit/Room#: 0762/6128-L  Discharging Unit Phone Number: [phone]      Emergency Contact:   Extended Emergency Contact Information  Primary Emergency Contact: Cayden Soto  Address: [address] [place] [address] Phone: [phone]  Relation: Child  Secondary Emergency Contact: [address] Phone: [phone]  Relation: Child    Past Surgical History:  Past Surgical History:   Procedure Laterality Date    AORTIC VALVE REPLACEMENT N/A 2021    TRANSCATHETER AORTIC VALVE REPLACEMENT FEMORAL APPROACH ANDPERICARDIALCENTESIS performed by Daron Hammans, MD at [facility]      lumbar    CHOLECYSTECTOMY      HYSTERECTOMY      PERICARDIUM SURGERY N/A 2021    PERICARDIOCENTESIS performed by Martha Kim MD at [facility]  2012    right    TRANSESOPHAGEAL ECHOCARDIOGRAM N/A 2021    TRANSESOPHAGEAL ECHOCARDIOGRAM performed by Daron Hammans, MD at [facility] 2021    EGD W/EUS FNA performed by Jesusita Guajardo MD at [facility] History: There is no immunization history on file for this patient.     Active Problems:  Patient Active Problem List   Diagnosis Code    Anxiety F41.9    Migraine headache Detail Level: Detailed Quality 226: Preventive Care And Screening: Tobacco Use: Screening And Cessation Intervention: Patient screened for tobacco use and is an ex/non-smoker U37.206    Accidental drug overdose T50.901A    Essential hypertension, benign I10    GERD (gastroesophageal reflux disease) K21.9    Dementia ([facility]) F03.90    Polypharmacy Z79.899    Malnutrition (HCC) E46    Acute delirium R41.0    Pericardial effusion I31.3    Pain syndrome, chronic G89.4    Critical aortic valve stenosis I35.0    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    Palliative care by specialist Z51.5    Moderate protein-calorie malnutrition ([facility]) E44.0       Isolation/Infection:   Isolation            No Isolation          Patient Infection Status       Infection Onset Added Last Indicated Last Indicated By Review Planned Expiration Resolved Resolved By    None active    Resolved    COVID-19 Rule Out 01/29/21 01/29/21 01/29/21 COVID-19 (Ordered)   01/29/21 Rule-Out Test Resulted    COVID-19 Rule Out 01/24/21 01/24/21 01/24/21 COVID-19 (Ordered)   01/24/21 Rule-Out Test Resulted            Nurse Assessment:  Last Vital Signs: BP (!) 98/52   Pulse 63   Temp 97.8 °F (36.6 °C) (Oral)   Resp 18   Ht 5' 8\" (1.727 m)   Wt 140 lb (63.5 kg)   SpO2 96%   BMI 21.29 kg/m²     Last documented pain score (0-10 scale): Pain Level: 0  Last Weight:   Wt Readings from Last 1 Encounters:   02/28/21 140 lb (63.5 kg)     Mental Status:  alert base line dementia        IV Access:  - None    Nursing Mobility/ADLs:  Walking   Assisted  Transfer  Assisted  Bathing  Assisted  Dressing  Assisted  Toileting  Assisted  Feeding  Independent  Med Admin  Assisted  Med Delivery   whole    Wound Care Documentation and Therapy:  Puncture 02/24/21 Groin (Active)   Wound Assessment Other (Comment) 02/24/21 2008   Closure Other (Comment) 02/24/21 1156   Dressing/Treatment Pressure dressing 02/24/21 1156   Number of days: 6       Puncture 02/24/21 Femoral Anterior;Proximal;Right (Active)   Number of days: 6        Elimination:  Continence:   · Bowel: No  · Bladder: No  Urinary Catheter: None   Colostomy/Ileostomy/Ileal Conduit: No Date of Last BM: 2/28/2021      Intake/Output Summary (Last 24 hours) at 3/2/2021 1019  Last data filed at 3/2/2021 0630  Gross per 24 hour   Intake 1160 ml   Output 1950 ml   Net -790 ml     I/O last 3 completed shifts: In: 1160 [P.O.:1160]  Out: 1950 [Urine:1950]    Safety Concerns: At Risk for Falls    Impairments/Disabilities:      None    Nutrition Therapy:  Current Nutrition Therapy:   - Oral Diet:  General    Routes of Feeding: Oral  Liquids: No Restrictions  Daily Fluid Restriction: no  Last Modified Barium Swallow with Video (Video Swallowing Test): not done    Treatments at the Time of Hospital Discharge:   Respiratory Treatments: none    Oxygen Therapy:  is not on home oxygen therapy. Ventilator:    - No ventilator support    Rehab Therapies: Physical Therapy and Occupational Therapy  Weight Bearing Status/Restrictions: No weight bearing restirctions  Other Medical Equipment (for information only, NOT a DME order):  walker  Other Treatments: none         Patient's personal belongings (please select all that are sent with patient):    Glasses, Dentures {DESC; UPPER/LOWER/BOTH:36881}, Daquan         RN SIGNATURE:  Electronically signed by Josue Padgett RN on 3/2/21 at 10:22 AM EST    CASE MANAGEMENT/SOCIAL WORK SECTION    Inpatient Status Date      Readmission Risk Assessment Score:  Readmission Risk              Risk of Unplanned Readmission:        17           Discharging to Facility/ Agency   · Name: [facility]  · Address:  · Phone:  · Fax:    Dialysis Facility (if applicable)   · Name:  · Address:  · Dialysis Schedule:  · Phone:  · Fax:    / signature: Electronically signed by Shital Betancourt on 3/2/21 at 11:52 AM EST    PHYSICIAN SECTION    Prognosis: Fair    Condition at Discharge: Stable    Rehab Potential (if transferring to Rehab):  Fair    Recommended Labs or Other Treatments After Discharge: Gene James / Kina Ceja in 1 week    Physician Certification: I certify the above information and transfer of Arcadio Solorzano  is necessary for the continuing treatment of the diagnosis listed and that she requires [place] for less 30 days.      Update Admission H&P: No change in H&P    PHYSICIAN SIGNATURE:  Electronically signed by Dwight Patton MD on 3/2/21 at 11:15 AM EST

## (undated) DEVICE — PICO 7 10CM X 20CM: Brand: PICO™ 7

## (undated) DEVICE — SYRINGE MED 50ML LUERLOCK TIP

## (undated) DEVICE — DRAPE SHEET: Brand: UNBRANDED

## (undated) DEVICE — 3M™ IOBAN™ 2 ANTIMICROBIAL INCISE DRAPE 6650EZ: Brand: IOBAN™ 2

## (undated) DEVICE — PICO SINGLE USE NEGATIVE PRESSURE WOUND THERAPY SYSTEM 10CM X 30CM 4IN. X 12IN.: Brand: PICO

## (undated) DEVICE — HOLDER NDL WEBST SNAG FREE

## (undated) DEVICE — PERCUTANEOUS ENTRY THINWALL NEEDLE  ONE-PART: Brand: COOK

## (undated) DEVICE — APPLICATOR MEDICATED 26 CC SOLUTION HI LT ORNG CHLORAPREP

## (undated) DEVICE — SET TRNQT W/ STD 7IN 12FR 5 TB 1 SNR DLP

## (undated) DEVICE — SET CARDIAC II

## (undated) DEVICE — Device: Brand: MEDEX

## (undated) DEVICE — GLOVE SURG SZ 65 THK91MIL LTX FREE SYN POLYISOPRENE

## (undated) DEVICE — SET CARDIAC I

## (undated) DEVICE — TAPE ADH W2INXL10YD PLAS TRNSPAR H2O RESIST HYPOALRG CURAD

## (undated) DEVICE — CATHETER DIAG AD 5FR L100CM COR GRY HYDRPHLC NYL MPA 2 W/ 2

## (undated) DEVICE — INTRO SHEATH W/6.0FRX10CMX.0385IN

## (undated) DEVICE — GLOVE ORTHO 8   MSG9480

## (undated) DEVICE — KIT MFLD ISOLATN NACL CNTRST PRT TBNG SPIK W/ PRSS TRNSDUC

## (undated) DEVICE — ADHESIVE SKIN CLOSURE TOP 36 CC HI VISC DERMBND MINI

## (undated) DEVICE — COVER,TABLE,60X90,STERILE: Brand: MEDLINE

## (undated) DEVICE — GLOVE ORANGE PI 7   MSG9070

## (undated) DEVICE — SOLUTION IV IRRIG WATER 1000ML POUR BRL 2F7114

## (undated) DEVICE — TOWEL,OR,DSP,ST,BLUE,STD,6/PK,12PK/CS: Brand: MEDLINE

## (undated) DEVICE — AGENT HEMSTAT W4XL8IN OXIDIZED REGENERATED CELOS ABSRB

## (undated) DEVICE — NEEDLE SPNL 20GA L3.5IN YEL HUB S STL REG WALL FIT STYL W/

## (undated) DEVICE — SOLUTION IV 1000ML 0.9% SOD CHL PH 5 INJ USP VIAFLX PLAS

## (undated) DEVICE — ALCOHOL 70% RUBBING 16OZ

## (undated) DEVICE — 1.5L THIN WALL CAN: Brand: CRD

## (undated) DEVICE — DRAIN SURG SGL COLL PT TB FOR ATS BG OASIS

## (undated) DEVICE — BOWL MED L 32OZ PLAS W/ MOLD GRAD EZ OPN PEEL PCH

## (undated) DEVICE — Device: Brand: BALLOON3

## (undated) DEVICE — CLOTH SURG PREP PREOPERATIVE CHLORHEXIDINE GLUC 2% READYPREP

## (undated) DEVICE — INTRODUCER SHTH 6FR L12CM DIA0.038IN HEMSTAS CLOSE TOL

## (undated) DEVICE — MEDI-TRACE CADENCE ADULT, PRE-CONNECT  DEFIBRILLATION ELECTRODE (10 PR/PK) - PHYSIO-CONTROL: Brand: MEDI-TRACE CADENCE

## (undated) DEVICE — GRADUATE

## (undated) DEVICE — ANGIOPLASTY ADD-ON PACK: Brand: MEDLINE INDUSTRIES, INC.

## (undated) DEVICE — GLOVE ORANGE PI 7 1/2   MSG9075

## (undated) DEVICE — STAPLER SKIN L39MM DIA0.53MM CRWN 5.7MM S STL FIX HD PROX

## (undated) DEVICE — INTENDED FOR TISSUE SEPARATION, AND OTHER PROCEDURES THAT REQUIRE A SHARP SURGICAL BLADE TO PUNCTURE OR CUT.: Brand: BARD-PARKER ® STAINLESS STEEL BLADES

## (undated) DEVICE — AMPLATZ EXTRA STIFF WIRE GUIDE: Brand: AMPLATZ

## (undated) DEVICE — CATH ANGIO 5FR .045IN AL1 100CM EXPO

## (undated) DEVICE — GLOVE SURG SZ 6 THK91MIL LTX FREE SYN POLYISOPRENE ANTI

## (undated) DEVICE — CLEANER,CAUTERY TIP,2X2",STERILE: Brand: MEDLINE

## (undated) DEVICE — E-Z CLEAN, NON-STICK, PTFE COATED, ELECTROSURGICAL BLADE ELECTRODE, MODIFIED EXTENDED INSULATION, 2.5 INCH (6.35 CM): Brand: MEGADYNE

## (undated) DEVICE — COVER PRB W14XL147CM TELESCOPICALLY FLD EXT LEN CIV-FLEX

## (undated) DEVICE — BLADE CLIPPER GEN PURP NS

## (undated) DEVICE — CATHETER ANGIO PIG 0.045 IN 5 FRX110 CM VENTRICULAR EXPO

## (undated) DEVICE — LARGE BORE STOPCOCK WITH ROTATING MALE LUER LOCK

## (undated) DEVICE — GUIDEWIRE VASC L260CM 0.035IN J TIP L3MM PTFE FIX COR NAMIC

## (undated) DEVICE — DRAPE,REIN 53X77,STERILE: Brand: MEDLINE

## (undated) DEVICE — DRESSING FOAM W22XL25CM FILVE LAYR FOAM DP DEF SAFETAC

## (undated) DEVICE — GUIDEWIRE VASC L260CM DIA0.035IN TAPR L11CM FLPY TIP L4CM

## (undated) DEVICE — PATIENT RETURN ELECTRODE, SINGLE-USE, CONTACT QUALITY MONITORING, ADULT, WITH 9FT CORD, FOR PATIENTS WEIGING OVER 33LBS. (15KG): Brand: MEGADYNE

## (undated) DEVICE — LOOP VES W25MM THK1MM MAXI RED SIL FLD REPELLENT 100 PER

## (undated) DEVICE — SURGICAL PROCEDURE PACK BASIC

## (undated) DEVICE — CATHETER DIAG 5FR L110CM ID0.045IN VENT VASC PGTL 145 EXPO

## (undated) DEVICE — KIT MED IMAG CNTRST AGNT W/ IOPAMIDOL REUSE

## (undated) DEVICE — SYRINGE 20ML LL S/C 50

## (undated) DEVICE — GEL US 20GM NONIRRITATING OVERWRAPPED FILE PCH TRNSMIT

## (undated) DEVICE — LOCK PERICARDIOCENTESIS SET: Brand: COOK

## (undated) DEVICE — SNAP KOVER: Brand: UNBRANDED

## (undated) DEVICE — TOWEL,OR,DSP,ST,BLUE,DLX,4/PK,20PK/CS: Brand: MEDLINE

## (undated) DEVICE — 3M™ MEDIPORE™ + PAD 3564: Brand: 3M™ MEDIPORE™

## (undated) DEVICE — DRESSING TRNSPAR W4XL55IN ACRYL SUP FLM W ADH WTRPRF OPSITE

## (undated) DEVICE — GUIDEWIRE VASC L145CM 0.035IN J TIP L3MM PTFE FIX COR NAMIC

## (undated) DEVICE — PADDLE INTERN DEFIB CHILD

## (undated) DEVICE — PACK SURG CARDIAC CATH DYNJ38860] MEDLINE INDUSTRIES INC]

## (undated) DEVICE — ENDOSCOPIC ULTRASOUND FINE NEEDLE BIOPSY (FNB) DEVICE: Brand: ACQUIRE

## (undated) DEVICE — SOLUTION IV IRRIG POUR BRL 0.9% SODIUM CHL 2F7124

## (undated) DEVICE — ADAPTER CATH WHT DISP FOR ANES

## (undated) DEVICE — GOWN,SIRUS,FABRNF,L,20/CS: Brand: MEDLINE

## (undated) DEVICE — GAUZE,SPONGE,POST-OP,4X3,STRL,LF: Brand: MEDLINE

## (undated) DEVICE — MEDIA CONTRAST RX ISOVUE-300 61% 30ML VIALS

## (undated) DEVICE — JP CHANNEL DRAIN, 24FR HUBLESS: Brand: CARDINAL HEALTH

## (undated) DEVICE — STANDARD HYPODERMIC NEEDLE,ALUMINUM HUB: Brand: MONOJECT

## (undated) DEVICE — 72" ARTERIAL PRESSURE TUBING: Brand: ICU MEDICAL

## (undated) DEVICE — GOWN,SIRUS,FABRNF,XL,20/CS: Brand: MEDLINE

## (undated) DEVICE — BITEBLOCK 54FR W/ DENT RIM BLOX

## (undated) DEVICE — COVER US PRB W5XL96IN LTX W/ GEL

## (undated) DEVICE — GUIDEWIRE ANGIO L150CM OD0.035IN STR FIX PTFE SLD SUPP

## (undated) DEVICE — GLOVE SURG SZ 7.5 L11.73IN FNGR THK9.8MIL STRW LTX POLYMER

## (undated) DEVICE — TUBING PRSS MON L12IN PVC RIG NONEXPANDING M TO FEM CONN

## (undated) DEVICE — KIT HND CTRL 3 W STPCOCK ROT END 54IN PREM HI PRSS TBNG AT

## (undated) DEVICE — DRAPE, MULTI FENESTRATED, HYBRID OR, STE: Brand: MEDLINE

## (undated) DEVICE — SET INTRO SHTH 5FR L45CM GRY INTEGR SIDE PRT HAEMOSTASIS

## (undated) DEVICE — SYRINGE MED 10ML POLYPR LUERSLIP TIP FLAT TOP W/O SFTY DISP

## (undated) DEVICE — SYRINGE MED 10ML LUERLOCK TIP W/O SFTY DISP

## (undated) DEVICE — FORCEPS BX L160CM DIA8MM GRSP DISECT CUP TIP NONLOCKING ROT

## (undated) DEVICE — LABEL MED CARD SURG 4 IN PANEL STRL

## (undated) DEVICE — CANNULA NSL ORAL AD FOR CAPNOFLEX CO2 O2 AIRLFE

## (undated) DEVICE — GLOVE ORANGE PI 8   MSG9080